# Patient Record
Sex: MALE | Race: WHITE | NOT HISPANIC OR LATINO | URBAN - METROPOLITAN AREA
[De-identification: names, ages, dates, MRNs, and addresses within clinical notes are randomized per-mention and may not be internally consistent; named-entity substitution may affect disease eponyms.]

---

## 2017-09-08 ENCOUNTER — EMERGENCY (EMERGENCY)
Facility: HOSPITAL | Age: 82
LOS: 1 days | Discharge: HOME | End: 2017-09-08
Attending: EMERGENCY MEDICINE | Admitting: EMERGENCY MEDICINE
Payer: MEDICARE

## 2017-09-08 VITALS
RESPIRATION RATE: 16 BRPM | HEART RATE: 117 BPM | OXYGEN SATURATION: 95 % | TEMPERATURE: 99 F | DIASTOLIC BLOOD PRESSURE: 67 MMHG | SYSTOLIC BLOOD PRESSURE: 114 MMHG

## 2017-09-08 VITALS
RESPIRATION RATE: 19 BRPM | HEART RATE: 91 BPM | OXYGEN SATURATION: 97 % | SYSTOLIC BLOOD PRESSURE: 140 MMHG | TEMPERATURE: 97 F | DIASTOLIC BLOOD PRESSURE: 75 MMHG

## 2017-09-08 PROCEDURE — 72100 X-RAY EXAM L-S SPINE 2/3 VWS: CPT | Mod: 26

## 2017-09-08 PROCEDURE — 99284 EMERGENCY DEPT VISIT MOD MDM: CPT | Mod: 25

## 2017-09-08 PROCEDURE — 72100 X-RAY EXAM L-S SPINE 2/3 VWS: CPT

## 2017-09-08 PROCEDURE — 73110 X-RAY EXAM OF WRIST: CPT

## 2017-09-08 PROCEDURE — 72220 X-RAY EXAM SACRUM TAILBONE: CPT | Mod: 26

## 2017-09-08 PROCEDURE — 93010 ELECTROCARDIOGRAM REPORT: CPT

## 2017-09-08 PROCEDURE — 72220 X-RAY EXAM SACRUM TAILBONE: CPT

## 2017-09-08 PROCEDURE — 93005 ELECTROCARDIOGRAM TRACING: CPT

## 2017-09-08 PROCEDURE — 73110 X-RAY EXAM OF WRIST: CPT | Mod: 26,RT

## 2017-09-08 RX ORDER — OXYCODONE HYDROCHLORIDE 5 MG/1
5 TABLET ORAL ONCE
Qty: 0 | Refills: 0 | Status: DISCONTINUED | OUTPATIENT
Start: 2017-09-08 | End: 2017-09-08

## 2017-09-08 RX ORDER — IBUPROFEN 200 MG
600 TABLET ORAL ONCE
Qty: 0 | Refills: 0 | Status: COMPLETED | OUTPATIENT
Start: 2017-09-08 | End: 2017-09-08

## 2017-09-08 RX ADMIN — Medication 600 MILLIGRAM(S): at 11:05

## 2017-09-08 RX ADMIN — Medication 600 MILLIGRAM(S): at 13:07

## 2017-09-08 RX ADMIN — OXYCODONE HYDROCHLORIDE 5 MILLIGRAM(S): 5 TABLET ORAL at 13:12

## 2017-09-08 NOTE — ED ADULT NURSE NOTE - OBJECTIVE STATEMENT
89yo male to Ed from assisted living facility after being found on floor. Pt denies fall, but c/o R middle and ring finger pain. Wife stated that pt had been c/o of that pain since yesterday, prior to fall. 89yo male to Ed from assisted living facility after being found on floor. Pt denies fall, but c/o R middle and ring finger pain. Wife stated that pt had been c/o of that pain since yesterday, prior to fall. Pt also c/o low back pain, no bruising or deformity noted. Pt denies LOC.

## 2017-09-08 NOTE — ED PROVIDER NOTE - OBJECTIVE STATEMENT
89 yo male, fell from chair at nursing home, mechanical fall, states he did not hit his head or neck, but rather, landed on his low back.  No LOC, ambulatory afterwards.  C/O mild/moderate low back pain and atraumatic R wrist pain that preceded the fall. 89 yo male, fell from chair at nursing home, mechanical fall, states he did not hit his head or neck, but rather, landed on his low back.  No LOC, ambulatory afterwards.  C/O mild/moderate low back pain and atraumatic R wrist pain that preceded the fall.  No CP, no SOB, no palpitations, no dizziness.

## 2017-09-08 NOTE — ED PROVIDER NOTE - PHYSICAL EXAMINATION
GEN: calm, cooperative, ENT: mucous membranes moist, HEAD: NCAT, CV: RRR, RESP: no respiratory distress, ABD: no abdominal TTP, MSK: paraspinal low lumbar TTP and coccygeal TTP, also w/ R wrist TTP without deformity or swelling, ROM intact, NEURO: awake, alert, oriented, PSYCH: affect normal

## 2017-09-08 NOTE — ED PROVIDER NOTE - INTERPRETATION
EKG reviewed for rate, rhythm, axis, intervals and segments, including QRS morphology, P wave appearance T wave appearance, AR interval, and QT interval.  I find the EKG to be unremarkable in all of these regards except as follows: 1st deg AVB, RBBB

## 2017-11-08 PROBLEM — Z00.00 ENCOUNTER FOR PREVENTIVE HEALTH EXAMINATION: Status: ACTIVE | Noted: 2017-11-08

## 2017-12-12 ENCOUNTER — APPOINTMENT (OUTPATIENT)
Dept: CARDIOLOGY | Facility: CLINIC | Age: 82
End: 2017-12-12
Payer: MEDICARE

## 2017-12-12 ENCOUNTER — LABORATORY RESULT (OUTPATIENT)
Age: 82
End: 2017-12-12

## 2017-12-12 ENCOUNTER — NON-APPOINTMENT (OUTPATIENT)
Age: 82
End: 2017-12-12

## 2017-12-12 VITALS
HEART RATE: 91 BPM | OXYGEN SATURATION: 95 % | DIASTOLIC BLOOD PRESSURE: 67 MMHG | SYSTOLIC BLOOD PRESSURE: 130 MMHG | HEIGHT: 66 IN | WEIGHT: 165 LBS | BODY MASS INDEX: 26.52 KG/M2

## 2017-12-12 DIAGNOSIS — R13.10 DYSPHAGIA, UNSPECIFIED: ICD-10-CM

## 2017-12-12 DIAGNOSIS — N40.0 BENIGN PROSTATIC HYPERPLASIA WITHOUT LOWER URINARY TRACT SYMPMS: ICD-10-CM

## 2017-12-12 DIAGNOSIS — I71.4 ABDOMINAL AORTIC ANEURYSM, W/OUT RUPTURE: ICD-10-CM

## 2017-12-12 DIAGNOSIS — E03.9 HYPOTHYROIDISM, UNSPECIFIED: ICD-10-CM

## 2017-12-12 DIAGNOSIS — I35.0 NONRHEUMATIC AORTIC (VALVE) STENOSIS: ICD-10-CM

## 2017-12-12 PROCEDURE — 93000 ELECTROCARDIOGRAM COMPLETE: CPT

## 2017-12-12 PROCEDURE — 99205 OFFICE O/P NEW HI 60 MIN: CPT

## 2017-12-12 RX ORDER — FINASTERIDE 5 MG/1
5 TABLET, FILM COATED ORAL
Qty: 90 | Refills: 0 | Status: ACTIVE | COMMUNITY
Start: 2017-12-12

## 2017-12-12 RX ORDER — TAMSULOSIN HYDROCHLORIDE 0.4 MG/1
0.4 CAPSULE ORAL
Qty: 90 | Refills: 3 | Status: ACTIVE | COMMUNITY
Start: 2017-12-12

## 2017-12-12 RX ORDER — LEVOTHYROXINE SODIUM 0.1 MG/1
100 TABLET ORAL
Qty: 90 | Refills: 3 | Status: ACTIVE | COMMUNITY
Start: 2017-12-12

## 2017-12-13 DIAGNOSIS — D51.9 VITAMIN B12 DEFICIENCY ANEMIA, UNSPECIFIED: ICD-10-CM

## 2017-12-14 LAB
ALBUMIN SERPL ELPH-MCNC: 3.3 G/DL
ALP BLD-CCNC: 64 U/L
ALT SERPL-CCNC: 10 U/L
ANION GAP SERPL CALC-SCNC: 11 MMOL/L
AST SERPL-CCNC: 14 U/L
BASOPHILS # BLD AUTO: 0.06 K/UL
BASOPHILS NFR BLD AUTO: 0.9 %
BILIRUB SERPL-MCNC: 0.3 MG/DL
BUN SERPL-MCNC: 28 MG/DL
CALCIUM SERPL-MCNC: 9.2 MG/DL
CHLORIDE SERPL-SCNC: 102 MMOL/L
CHOLEST SERPL-MCNC: 161 MG/DL
CHOLEST/HDLC SERPL: 4.4 RATIO
CO2 SERPL-SCNC: 28 MMOL/L
CREAT SERPL-MCNC: 0.87 MG/DL
EOSINOPHIL # BLD AUTO: 0.11 K/UL
EOSINOPHIL NFR BLD AUTO: 1.8
FOLATE SERPL-MCNC: 11.3 NG/ML
FT4I SERPL CALC-MCNC: 6 INDEX
GLUCOSE SERPL-MCNC: 143 MG/DL
HBA1C MFR BLD HPLC: 5.3 %
HCT VFR BLD CALC: 35.6 %
HDLC SERPL-MCNC: 37 MG/DL
HGB BLD-MCNC: 11.3 G/DL
LDLC SERPL CALC-MCNC: 94 MG/DL
LYMPHOCYTES # BLD AUTO: 0.89 K/UL
LYMPHOCYTES NFR BLD AUTO: 14 %
MAN DIFF?: NORMAL
MCHC RBC-ENTMCNC: 31.7 GM/DL
MCHC RBC-ENTMCNC: 34.5 PG
MCV RBC AUTO: 108.5 FL
MONOCYTES # BLD AUTO: 0.5 K/UL
MONOCYTES NFR BLD AUTO: 7.9 %
NEUTROPHILS # BLD AUTO: 4.78 K/UL
NEUTROPHILS NFR BLD AUTO: 73.6 %
PLATELET # BLD AUTO: 161 K/UL
POTASSIUM SERPL-SCNC: 4.4 MMOL/L
PROT SERPL-MCNC: 6.3 G/DL
RBC # BLD: 3.28 M/UL
RBC # FLD: 19.2 %
SODIUM SERPL-SCNC: 141 MMOL/L
T3RU NFR SERPL: 0.88 INDEX
T4 FREE SERPL-MCNC: 1.2 NG/DL
T4 SERPL-MCNC: 5.3 UG/DL
TRIGL SERPL-MCNC: 148 MG/DL
TSH SERPL-ACNC: 1.25 UIU/ML
VIT B12 SERPL-MCNC: 560 PG/ML
WBC # FLD AUTO: 6.34 K/UL

## 2017-12-18 DIAGNOSIS — G89.29 LOW BACK PAIN: ICD-10-CM

## 2017-12-18 DIAGNOSIS — M54.5 LOW BACK PAIN: ICD-10-CM

## 2017-12-18 RX ORDER — INDOMETHACIN 50 MG/1
50 CAPSULE ORAL TWICE DAILY
Qty: 60 | Refills: 3 | Status: ACTIVE | COMMUNITY
Start: 2017-12-18 | End: 1900-01-01

## 2018-01-05 ENCOUNTER — APPOINTMENT (OUTPATIENT)
Dept: SPEECH THERAPY | Facility: CLINIC | Age: 83
End: 2018-01-05

## 2018-01-10 ENCOUNTER — MESSAGE (OUTPATIENT)
Age: 83
End: 2018-01-10

## 2018-06-12 ENCOUNTER — INPATIENT (INPATIENT)
Facility: HOSPITAL | Age: 83
LOS: 11 days | Discharge: NURSING, REHAB OR LONG-TERM CARE | DRG: 871 | End: 2018-06-24
Attending: FAMILY MEDICINE | Admitting: FAMILY MEDICINE
Payer: MEDICARE

## 2018-06-12 VITALS
TEMPERATURE: 98 F | DIASTOLIC BLOOD PRESSURE: 69 MMHG | WEIGHT: 153 LBS | RESPIRATION RATE: 18 BRPM | HEIGHT: 66 IN | HEART RATE: 98 BPM | OXYGEN SATURATION: 97 % | SYSTOLIC BLOOD PRESSURE: 114 MMHG

## 2018-06-12 DIAGNOSIS — A41.9 SEPSIS, UNSPECIFIED ORGANISM: ICD-10-CM

## 2018-06-12 DIAGNOSIS — Z51.5 ENCOUNTER FOR PALLIATIVE CARE: ICD-10-CM

## 2018-06-12 DIAGNOSIS — Z71.89 OTHER SPECIFIED COUNSELING: ICD-10-CM

## 2018-06-12 DIAGNOSIS — Z29.9 ENCOUNTER FOR PROPHYLACTIC MEASURES, UNSPECIFIED: ICD-10-CM

## 2018-06-12 DIAGNOSIS — R65.10 SYSTEMIC INFLAMMATORY RESPONSE SYNDROME (SIRS) OF NON-INFECTIOUS ORIGIN WITHOUT ACUTE ORGAN DYSFUNCTION: ICD-10-CM

## 2018-06-12 DIAGNOSIS — N40.0 BENIGN PROSTATIC HYPERPLASIA WITHOUT LOWER URINARY TRACT SYMPTOMS: ICD-10-CM

## 2018-06-12 DIAGNOSIS — R45.1 RESTLESSNESS AND AGITATION: ICD-10-CM

## 2018-06-12 DIAGNOSIS — E03.9 HYPOTHYROIDISM, UNSPECIFIED: ICD-10-CM

## 2018-06-12 DIAGNOSIS — R53.81 OTHER MALAISE: ICD-10-CM

## 2018-06-12 DIAGNOSIS — I63.9 CEREBRAL INFARCTION, UNSPECIFIED: ICD-10-CM

## 2018-06-12 LAB
ALBUMIN SERPL ELPH-MCNC: 2.7 G/DL — LOW (ref 3.5–5)
ALP SERPL-CCNC: 85 U/L — SIGNIFICANT CHANGE UP (ref 40–120)
ALT FLD-CCNC: 17 U/L DA — SIGNIFICANT CHANGE UP (ref 10–60)
ANION GAP SERPL CALC-SCNC: 5 MMOL/L — SIGNIFICANT CHANGE UP (ref 5–17)
APPEARANCE UR: CLEAR — SIGNIFICANT CHANGE UP
APTT BLD: 27.3 SEC — LOW (ref 27.5–37.4)
AST SERPL-CCNC: 16 U/L — SIGNIFICANT CHANGE UP (ref 10–40)
BASOPHILS # BLD AUTO: 0.1 K/UL — SIGNIFICANT CHANGE UP (ref 0–0.2)
BASOPHILS NFR BLD AUTO: 0.5 % — SIGNIFICANT CHANGE UP (ref 0–2)
BILIRUB SERPL-MCNC: 0.8 MG/DL — SIGNIFICANT CHANGE UP (ref 0.2–1.2)
BILIRUB UR-MCNC: NEGATIVE — SIGNIFICANT CHANGE UP
BUN SERPL-MCNC: 28 MG/DL — HIGH (ref 7–18)
CALCIUM SERPL-MCNC: 8.7 MG/DL — SIGNIFICANT CHANGE UP (ref 8.4–10.5)
CHLORIDE SERPL-SCNC: 99 MMOL/L — SIGNIFICANT CHANGE UP (ref 96–108)
CHOLEST SERPL-MCNC: 118 MG/DL — SIGNIFICANT CHANGE UP (ref 10–199)
CO2 SERPL-SCNC: 28 MMOL/L — SIGNIFICANT CHANGE UP (ref 22–31)
COLOR SPEC: YELLOW — SIGNIFICANT CHANGE UP
CREAT SERPL-MCNC: 0.9 MG/DL — SIGNIFICANT CHANGE UP (ref 0.5–1.3)
DIFF PNL FLD: ABNORMAL
EOSINOPHIL # BLD AUTO: 0 K/UL — SIGNIFICANT CHANGE UP (ref 0–0.5)
EOSINOPHIL NFR BLD AUTO: 0 % — SIGNIFICANT CHANGE UP (ref 0–6)
GLUCOSE SERPL-MCNC: 133 MG/DL — HIGH (ref 70–99)
GLUCOSE UR QL: NEGATIVE — SIGNIFICANT CHANGE UP
HCT VFR BLD CALC: 32.7 % — LOW (ref 39–50)
HDLC SERPL-MCNC: 48 MG/DL — SIGNIFICANT CHANGE UP (ref 40–125)
HGB BLD-MCNC: 10.2 G/DL — LOW (ref 13–17)
INR BLD: 1.21 RATIO — HIGH (ref 0.88–1.16)
KETONES UR-MCNC: NEGATIVE — SIGNIFICANT CHANGE UP
LACTATE SERPL-SCNC: 1.2 MMOL/L — SIGNIFICANT CHANGE UP (ref 0.7–2)
LACTATE SERPL-SCNC: 2.3 MMOL/L — HIGH (ref 0.7–2)
LEUKOCYTE ESTERASE UR-ACNC: NEGATIVE — SIGNIFICANT CHANGE UP
LIPID PNL WITH DIRECT LDL SERPL: 55 MG/DL — SIGNIFICANT CHANGE UP
LYMPHOCYTES # BLD AUTO: 1.5 K/UL — SIGNIFICANT CHANGE UP (ref 1–3.3)
LYMPHOCYTES # BLD AUTO: 11.7 % — LOW (ref 13–44)
MCHC RBC-ENTMCNC: 31.2 GM/DL — LOW (ref 32–36)
MCHC RBC-ENTMCNC: 34.3 PG — HIGH (ref 27–34)
MCV RBC AUTO: 110 FL — HIGH (ref 80–100)
MONOCYTES # BLD AUTO: 0.6 K/UL — SIGNIFICANT CHANGE UP (ref 0–0.9)
MONOCYTES NFR BLD AUTO: 5.2 % — SIGNIFICANT CHANGE UP (ref 2–14)
NEUTROPHILS # BLD AUTO: 10.3 K/UL — HIGH (ref 1.8–7.4)
NEUTROPHILS NFR BLD AUTO: 82.6 % — HIGH (ref 43–77)
NITRITE UR-MCNC: NEGATIVE — SIGNIFICANT CHANGE UP
PH UR: 7 — SIGNIFICANT CHANGE UP (ref 5–8)
PLATELET # BLD AUTO: 199 K/UL — SIGNIFICANT CHANGE UP (ref 150–400)
POTASSIUM SERPL-MCNC: 4 MMOL/L — SIGNIFICANT CHANGE UP (ref 3.5–5.3)
POTASSIUM SERPL-SCNC: 4 MMOL/L — SIGNIFICANT CHANGE UP (ref 3.5–5.3)
PROT SERPL-MCNC: 7.2 G/DL — SIGNIFICANT CHANGE UP (ref 6–8.3)
PROT UR-MCNC: 15
PROTHROM AB SERPL-ACNC: 13.2 SEC — HIGH (ref 9.8–12.7)
RBC # BLD: 2.97 M/UL — LOW (ref 4.2–5.8)
RBC # FLD: 19.3 % — HIGH (ref 10.3–14.5)
SODIUM SERPL-SCNC: 132 MMOL/L — LOW (ref 135–145)
SP GR SPEC: 1.01 — SIGNIFICANT CHANGE UP (ref 1.01–1.02)
TOTAL CHOLESTEROL/HDL RATIO MEASUREMENT: 2.5 RATIO — LOW (ref 3.4–9.6)
TRIGL SERPL-MCNC: 76 MG/DL — SIGNIFICANT CHANGE UP (ref 10–149)
TROPONIN I SERPL-MCNC: 0.04 NG/ML — SIGNIFICANT CHANGE UP (ref 0–0.04)
UROBILINOGEN FLD QL: NEGATIVE — SIGNIFICANT CHANGE UP
WBC # BLD: 12.5 K/UL — HIGH (ref 3.8–10.5)
WBC # FLD AUTO: 12.5 K/UL — HIGH (ref 3.8–10.5)

## 2018-06-12 PROCEDURE — 71045 X-RAY EXAM CHEST 1 VIEW: CPT | Mod: 26

## 2018-06-12 PROCEDURE — 99285 EMERGENCY DEPT VISIT HI MDM: CPT

## 2018-06-12 PROCEDURE — 93880 EXTRACRANIAL BILAT STUDY: CPT | Mod: 26

## 2018-06-12 PROCEDURE — 70450 CT HEAD/BRAIN W/O DYE: CPT | Mod: 26

## 2018-06-12 PROCEDURE — 99223 1ST HOSP IP/OBS HIGH 75: CPT

## 2018-06-12 RX ORDER — HALOPERIDOL DECANOATE 100 MG/ML
2 INJECTION INTRAMUSCULAR ONCE
Qty: 0 | Refills: 0 | Status: DISCONTINUED | OUTPATIENT
Start: 2018-06-12 | End: 2018-06-12

## 2018-06-12 RX ORDER — SODIUM CHLORIDE 9 MG/ML
1000 INJECTION, SOLUTION INTRAVENOUS
Qty: 0 | Refills: 0 | Status: DISCONTINUED | OUTPATIENT
Start: 2018-06-12 | End: 2018-06-14

## 2018-06-12 RX ORDER — SODIUM CHLORIDE 9 MG/ML
1400 INJECTION INTRAMUSCULAR; INTRAVENOUS; SUBCUTANEOUS ONCE
Qty: 0 | Refills: 0 | Status: COMPLETED | OUTPATIENT
Start: 2018-06-12 | End: 2018-06-12

## 2018-06-12 RX ORDER — CEFTRIAXONE 500 MG/1
1 INJECTION, POWDER, FOR SOLUTION INTRAMUSCULAR; INTRAVENOUS EVERY 24 HOURS
Qty: 0 | Refills: 0 | Status: DISCONTINUED | OUTPATIENT
Start: 2018-06-12 | End: 2018-06-15

## 2018-06-12 RX ORDER — HALOPERIDOL DECANOATE 100 MG/ML
2 INJECTION INTRAMUSCULAR ONCE
Qty: 0 | Refills: 0 | Status: COMPLETED | OUTPATIENT
Start: 2018-06-12 | End: 2018-06-12

## 2018-06-12 RX ORDER — CEFTRIAXONE 500 MG/1
1 INJECTION, POWDER, FOR SOLUTION INTRAMUSCULAR; INTRAVENOUS ONCE
Qty: 0 | Refills: 0 | Status: COMPLETED | OUTPATIENT
Start: 2018-06-12 | End: 2018-06-12

## 2018-06-12 RX ORDER — HYDRALAZINE HCL 50 MG
10 TABLET ORAL EVERY 8 HOURS
Qty: 0 | Refills: 0 | Status: DISCONTINUED | OUTPATIENT
Start: 2018-06-12 | End: 2018-06-17

## 2018-06-12 RX ORDER — ALTEPLASE 100 MG
6 KIT INTRAVENOUS ONCE
Qty: 0 | Refills: 0 | Status: COMPLETED | OUTPATIENT
Start: 2018-06-12 | End: 2018-06-12

## 2018-06-12 RX ORDER — ACETAMINOPHEN 500 MG
650 TABLET ORAL EVERY 6 HOURS
Qty: 0 | Refills: 0 | Status: DISCONTINUED | OUTPATIENT
Start: 2018-06-12 | End: 2018-06-20

## 2018-06-12 RX ORDER — ACETAMINOPHEN 500 MG
650 TABLET ORAL ONCE
Qty: 0 | Refills: 0 | Status: COMPLETED | OUTPATIENT
Start: 2018-06-12 | End: 2018-06-12

## 2018-06-12 RX ORDER — ALTEPLASE 100 MG
56 KIT INTRAVENOUS ONCE
Qty: 0 | Refills: 0 | Status: COMPLETED | OUTPATIENT
Start: 2018-06-12 | End: 2018-06-12

## 2018-06-12 RX ORDER — ALTEPLASE 100 MG
56 KIT INTRAVENOUS ONCE
Qty: 0 | Refills: 0 | Status: DISCONTINUED | OUTPATIENT
Start: 2018-06-12 | End: 2018-06-12

## 2018-06-12 RX ORDER — CHLORHEXIDINE GLUCONATE 213 G/1000ML
1 SOLUTION TOPICAL
Qty: 0 | Refills: 0 | Status: DISCONTINUED | OUTPATIENT
Start: 2018-06-12 | End: 2018-06-14

## 2018-06-12 RX ORDER — AZITHROMYCIN 500 MG/1
TABLET, FILM COATED ORAL
Qty: 0 | Refills: 0 | Status: DISCONTINUED | OUTPATIENT
Start: 2018-06-12 | End: 2018-06-15

## 2018-06-12 RX ORDER — AZITHROMYCIN 500 MG/1
500 TABLET, FILM COATED ORAL ONCE
Qty: 0 | Refills: 0 | Status: COMPLETED | OUTPATIENT
Start: 2018-06-12 | End: 2018-06-12

## 2018-06-12 RX ORDER — AZITHROMYCIN 500 MG/1
500 TABLET, FILM COATED ORAL EVERY 24 HOURS
Qty: 0 | Refills: 0 | Status: DISCONTINUED | OUTPATIENT
Start: 2018-06-13 | End: 2018-06-15

## 2018-06-12 RX ORDER — LEVOTHYROXINE SODIUM 125 MCG
50 TABLET ORAL AT BEDTIME
Qty: 0 | Refills: 0 | Status: DISCONTINUED | OUTPATIENT
Start: 2018-06-12 | End: 2018-06-17

## 2018-06-12 RX ORDER — SODIUM CHLORIDE 9 MG/ML
700 INJECTION INTRAMUSCULAR; INTRAVENOUS; SUBCUTANEOUS ONCE
Qty: 0 | Refills: 0 | Status: COMPLETED | OUTPATIENT
Start: 2018-06-12 | End: 2018-06-12

## 2018-06-12 RX ADMIN — AZITHROMYCIN 250 MILLIGRAM(S): 500 TABLET, FILM COATED ORAL at 10:44

## 2018-06-12 RX ADMIN — CEFTRIAXONE 100 GRAM(S): 500 INJECTION, POWDER, FOR SOLUTION INTRAMUSCULAR; INTRAVENOUS at 07:50

## 2018-06-12 RX ADMIN — Medication 650 MILLIGRAM(S): at 16:34

## 2018-06-12 RX ADMIN — ALTEPLASE 56 MILLIGRAM(S): KIT at 09:02

## 2018-06-12 RX ADMIN — Medication 1 MILLIGRAM(S): at 17:26

## 2018-06-12 RX ADMIN — Medication 10 MILLIGRAM(S): at 12:01

## 2018-06-12 RX ADMIN — CHLORHEXIDINE GLUCONATE 1 APPLICATION(S): 213 SOLUTION TOPICAL at 14:37

## 2018-06-12 RX ADMIN — SODIUM CHLORIDE 4200 MILLILITER(S): 9 INJECTION INTRAMUSCULAR; INTRAVENOUS; SUBCUTANEOUS at 07:45

## 2018-06-12 RX ADMIN — ALTEPLASE 360 MILLIGRAM(S): KIT at 09:00

## 2018-06-12 RX ADMIN — Medication 1 MILLIGRAM(S): at 21:18

## 2018-06-12 RX ADMIN — Medication 50 MICROGRAM(S): at 22:00

## 2018-06-12 RX ADMIN — HALOPERIDOL DECANOATE 2 MILLIGRAM(S): 100 INJECTION INTRAMUSCULAR at 12:01

## 2018-06-12 RX ADMIN — CEFTRIAXONE 100 GRAM(S): 500 INJECTION, POWDER, FOR SOLUTION INTRAMUSCULAR; INTRAVENOUS at 14:36

## 2018-06-12 RX ADMIN — SODIUM CHLORIDE 2100 MILLILITER(S): 9 INJECTION INTRAMUSCULAR; INTRAVENOUS; SUBCUTANEOUS at 08:00

## 2018-06-12 RX ADMIN — Medication 1 MILLIGRAM(S): at 12:31

## 2018-06-12 RX ADMIN — Medication 650 MILLIGRAM(S): at 08:00

## 2018-06-12 RX ADMIN — SODIUM CHLORIDE 70 MILLILITER(S): 9 INJECTION, SOLUTION INTRAVENOUS at 12:02

## 2018-06-12 NOTE — STROKE CODE NOTE - SUBJECTIVE
89-year-old right-handed white gentleman first evaluated by telestroke at [facility] on 6/12/18 with change in mental status. According to his wife, he had been "shaking" for most of the night. On 6/12/18 he awoke and was able to walk to the bathroom with his walker as usual. He sat on the toilet, and then had difficulty getting off the toilet, possibly due to right hemiparesis. There was diminished verbal output. ROS otherwise negative. Exam. T = 102.4°F. NIHSS = 15. Alert, probably attentive, but hearing loss made exam difficult; minimal verbal output, except for occasional isolated words; appeared to follow most one step commands, although did not "make fist";? Decreased blink to threat on right; poor cooperation with motor exam: Both arms fall to bed in a couple of seconds, right possibly faster than left; both legs move spontaneously but not against gravity;? Decreased grimace to noxious stimuli on right; remainder of neurologic exam was nonfocal.

## 2018-06-12 NOTE — ED ADULT NURSE NOTE - OBJECTIVE STATEMENT
Arrived warm to touch and dysphasia.  Wife described episodes of trembling at night for 3 days.  Based on EMS recount of early morning situation Stroke code called.  Sepsis code also called for elevated temp.  Both protocols followed as per hospital policy.  Wife at bedside.  No obvious facial droop, moving all extremities however decreased sensations to right side

## 2018-06-12 NOTE — CONSULT NOTE ADULT - PROBLEM SELECTOR RECOMMENDATION 3
Per wife's report, pt alert and oriented, with increasing weakness and recurrent falls - 4 falls reported in the past week. Pt with HHA 12 x 7. PT to darleen.

## 2018-06-12 NOTE — STROKE CODE NOTE - OBJECTIVE
CT head (6/12/18) to my eye is unremarkable, although the official reading noted a small chronic right frontal cortical infarct. Impression. During the night of 6/11/18-6/12/18 he was "shaking" throughout most of the night. On 6/12/18, in the morning, he had difficulty getting off the toilet, possibly due to right-sided weakness and there was decreased verbal output. It's possible that he is aphasic with a subtle right hemiparesis, consistent with left hemispheric dysfunction, possibly left hemispheric infarction of unknown cause. CT head (6/12/18) to my eye is unremarkable, although the official reading noted a small chronic right frontal cortical infarct. Impression. During the night of 6/11/18-6/12/18 he was "shaking" throughout most of the night. On 6/12/18, in the morning, he had difficulty getting off the toilet, possibly due to right-sided weakness and there was decreased verbal output. It's possible that he is aphasic with a subtle right hemiparesis, consistent with left hemispheric dysfunction, possibly left hemispheric infarction of unknown cause. On the other hand, most of his neurologic findings are "soft", he is febrile, and after discussion with Dr. Jerome, he may be septic and encephalopathic.

## 2018-06-12 NOTE — ED ADULT NURSE REASSESSMENT NOTE - NS ED NURSE REASSESS COMMENT FT1
Approximately at 1030, patient became extremely agitated and combative.  Hitting at staff and family members.  Spoke with Dr. Gomez regarding and awaiting orders.

## 2018-06-12 NOTE — ED PROVIDER NOTE - MEDICAL DECISION MAKING DETAILS
Pt febrile upon return from CT, wife states pt tremolous with increased urianry frequency since last night. No facial asymwtry on evalaution. pt able to say his name slowly. although pt is not following commands Pt noted to have spontaneous movements of all extremities against gravity. DW neuro Dr. Libman and PMD Dr. Isaacs, pt to be admitted for IVF and abx, not tpa candidate.

## 2018-06-12 NOTE — H&P ADULT - ATTENDING COMMENTS
85 yo male from home HHA 12 hours ambulates with walker pmh of BPH, arthiritis hypothyroidism, thoracic aneurysm  presented to the hospital with complaints of weakness, aphasia , poor resposiveness and unable to get up from toilet seat.  is being admitted to ICU for concern of CVA s/p TPA monitoring and for sepsis likely secondary to pna vs UTI.       Problem/Plan - 1:  ·  Problem: CVA (cerebral vascular accident).  Plan: cva  presented with NIH score of 15  right hemiparesis and aphasia  s/p tele stroke and TPA given  monitor in ICU  neuro scheck q1 hour  neurology evaluation  start on statin , hold asa s/p TPA  hydralazine prn for bp >185/105  ct head: Moderate severe chronic small vessel ischemic changes in the frontal   parietal white matter and small chronic appearing right frontal cortical   infarct.  f/u carotid doppler and ECHO  ekg NSR FIRST Degree block left axis deviation  npo f/u s/s eval.      Problem/Plan - 2:  ·  Problem: Sepsis.  Plan: sepsis  wbc count of 12.5 lactate of 2.5  cxr: Mild pulmonary vascular congestion. Small right basilar nodular opacity;   consider follow   s/p 2 liter bolus  concernf or UTI too  c/w rocephin and azithromycin  f/u cultures and ua.      Problem/Plan - 3:  ·  Problem: Hypothyroidism.  Plan: on synthroid 100 at home  iv push half dose as NPO.      Problem/Plan - 4:  ·  Problem: BPH (benign prostatic hyperplasia).  Plan: hold home dose of flomax  resume when patient can swallow.      Problem/Plan - 5:  ·  Problem: Goals of care, counseling/discussion.  Plan: d/w at length with wife and daughter want DNR/DNI.      Problem/Plan - 6:  Problem: Need for prophylactic measure. Plan: no DVT Lovenox for DVT ppx as just got TPA. 89 yo male from home HHA 12 hours ambulates with walker pmh of BPH, arthiritis hypothyroidism, thoracic aneurysm  presented to the hospital with complaints of weakness, aphasia , poor resposiveness and unable to get up from toilet seat.  is being admitted to ICU for concern of CVA s/p TPA monitoring and for sepsis likely secondary to pna vs UTI.       Problem/Plan - 1:  ·  Problem: CVA (cerebral vascular accident).  Plan: cva  presented with NIH score of 15  right hemiparesis and aphasia  s/p tele stroke and TPA given  monitor in ICU  neuro scheck q1 hour  neurology evaluation  start on statin , hold asa s/p TPA  hydralazine prn for bp >185/105  ct head: Moderate severe chronic small vessel ischemic changes in the frontal   parietal white matter and small chronic appearing right frontal cortical   infarct.  f/u carotid doppler and ECHO  ekg NSR FIRST Degree block left axis deviation  npo f/u s/s eval.      Problem/Plan - 2:  ·  Problem: Sepsis.  Plan: sepsis  wbc count of 12.5 lactate of 2.5  cxr: Mild pulmonary vascular congestion. Small right basilar nodular opacity;   consider follow   s/p 2 liter bolus  concernf or UTI too  c/w rocephin and azithromycin  f/u cultures and ua.      Problem/Plan - 3:  ·  Problem: Hypothyroidism.  Plan: on synthroid 100 at home  iv push half dose as NPO.      Problem/Plan - 4:  ·  Problem: BPH (benign prostatic hyperplasia).  Plan: hold home dose of flomax  resume when patient can swallow.      Problem/Plan - 5:  ·  Problem: Goals of care, counseling/discussion.  Plan: d/w at length with wife and daughter want DNR/DNI.      Problem/Plan - 6:  Problem: Need for prophylactic measure. Plan: no DVT Lovenox for DVT ppx as just got TPA.

## 2018-06-12 NOTE — H&P ADULT - HISTORY OF PRESENT ILLNESS
85 yo male from home HHA 12 hours ambulates with walker pmh of BPH, arthiritis hypothyroidism, thoracic aneurysm  presented to the hospital with complaints of weakness, aphasia , poor resposiveness and unable to get up from toilet seat. history obtained by family at bedside. According to his wife, he had been "shaking" for most of the night. On 6/12/18 he awoke and was able to walk to the bathroom with his walker as usual. He sat on the toilet, and then had difficulty getting off the toiletThere was diminished verbal output. wife said he used his left arm to try to get up but she felt his right side was weak. his eyes were closed and he was not responsive. she called EMS. ROS positive for increased urianry frequency and buring for past couple of days. no chest pain , SOB cough.    in the ER Stroke code called on arrival at 7:15a, tele-stroke activated at 7:20a. VS STABEL EXCEPT tmax 102.4. ON TELE stroke patient had NIH scale of 15. b/l weakness greater on right side. code sepsis was also initiaated. labs pertinent for wbc count of q12 , ekg nsr left axis. lactate of 2.4. cxr : Mild pulmonary vascular congestion. Small right basilar nodular opacity. 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 TPA given at 9 am , and icu consult called.   and unable to get up from toilet seat. history obtained by family at bedside. According to his wife, he had been "shaking" for most of the night. On 6/12/18 he awoke and was able to walk to the bathroom with his walker as usual. He sat on the toilet, and then had difficulty getting off the toiletThere was diminished verbal output. wife said he used his left arm to try to get up but she felt his right side was weak. his eyes were closed and he was not responsive. she called EMS. ROS positive for increased urianry frequency and buring for past couple of days. no chest pain , SOB cough.    in the ER Stroke code called on arrival at 7:15a, tele-stroke activated at 7:20a. VS STABEL EXCEPT tmax 102.4. ON TELE stroke patient had NIH scale of 15. b/l weakness greater on right side. code sepsis was also initiaated. labs pertinent for wbc count of q12 , ekg nsr left axis. lactate of 2.4. cxr : Mild pulmonary vascular congestion. Small right basilar nodular opacity. 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 TPA given at 9 am , and icu consult called. 89 yo male from home HHA 12 hours ambulates with walker pmh of BPH, arthiritis hypothyroidism, thoracic aneurysm  presented to the hospital with complaints of weakness, aphasia , poor resposiveness and unable to get up from toilet seat. history obtained by family at bedside. According to his wife, he had been "shaking" for most of the night. On 6/12/18 he awoke and was able to walk to the bathroom with his walker as usual. He sat on the toilet, and then had difficulty getting off the toiletThere was diminished verbal output. wife said he used his left arm to try to get up but she felt his right side was weak. his eyes were closed and he was not responsive. she called EMS. ROS positive for increased urianry frequency and buring for past couple of days. no chest pain , SOB cough.    in the ER Stroke code called on arrival at 7:15a, tele-stroke activated at 7:20a. VS STABEL EXCEPT tmax 102.4. ON TELE stroke patient had NIH scale of 15. b/l weakness greater on right side. code sepsis was also initiaated. labs pertinent for wbc count of q12 , ekg nsr left axis. lactate of 2.4. cxr : Mild pulmonary vascular congestion. Small right basilar nodular opacity. 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 TPA given at 9 am , and icu consult called.   and unable to get up from toilet seat. history obtained by family at bedside. According to his wife, he had been "shaking" for most of the night. On 6/12/18 he awoke and was able to walk to the bathroom with his walker as usual. He sat on the toilet, and then had difficulty getting off the toiletThere was diminished verbal output. wife said he used his left arm to try to get up but she felt his right side was weak. his eyes were closed and he was not responsive. she called EMS. ROS positive for increased urianry frequency and buring for past couple of days. no chest pain , SOB cough.    in the ER Stroke code called on arrival at 7:15a, tele-stroke activated at 7:20a. VS STABEL EXCEPT tmax 102.4. ON TELE stroke patient had NIH scale of 15. b/l weakness greater on right side. code sepsis was also initiaated. labs pertinent for wbc count of q12 , ekg nsr left axis. lactate of 2.4. cxr : Mild pulmonary vascular congestion. Small right basilar nodular opacity. 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 TPA given at 9 am , and icu consult called.

## 2018-06-12 NOTE — PROGRESS NOTE ADULT - SUBJECTIVE AND OBJECTIVE BOX
Patient admitted to the ICU from ER with acute cva. S/P tpa. With possible sepsis due to UTI. spoke with wife and daughter and son in the ICU in detail.

## 2018-06-12 NOTE — H&P ADULT - PROBLEM SELECTOR PLAN 2
sepsis  wbc count of 12.5 lactate of 2.5  cxr: Mild pulmonary vascular congestion. Small right basilar nodular opacity;   consider follow   s/p 2 liter bolus  concernf or UTI too  c/w rocephin and azithromycin  f/u cultures and ua

## 2018-06-12 NOTE — H&P ADULT - PROBLEM SELECTOR PLAN 1
cva  presented with NIH score of 15  right hemiparesis and aphasia  s/p tele stroke and TPA given  monitor in ICU  neuro scheck q1 hour  neurology evaluation  start on statin , hold asa s/p TPA  hydralazine prn for bp >185/105  ct head: Moderate severe chronic small vessel ischemic changes in the frontal   parietal white matter and small chronic appearing right frontal cortical   infarct.  f/u carotid doppler and ECHO  ekg NSR FIRST Degree block left axis deviation  npo f/u s/s eval

## 2018-06-12 NOTE — CONSULT NOTE ADULT - SUBJECTIVE AND OBJECTIVE BOX
HPI:  89 yo male from home HHA 12 hours ambulates with walker pmh of BPH, arthiritis hypothyroidism, thoracic aneurysm presented to the hospital with complaints of weakness, aphasia, poor responsiveness and unable to get up from toilet seat. s/p TPA. Pt admitted to ICU for concern of CVA s/p TPA monitoring and for sepsis likely secondary to pna vs UTI. DNR / DNI on file.     PAST MEDICAL & SURGICAL HISTORY:  Neurogenic bladder  Hypothyroidism      SOCIAL HISTORY:    Admitted from:  home, lives with wife. Has HHA 12 x 7  Anabaptist:        Buddhist                            Preferred Language: English     Surrogate/HCP/Guardian: April Rodrigues (dtr): [phone]    FAMILY HISTORY: noncontributory to current condition    Baseline ADLs (prior to admission): ambulatory with walker, recurrent falls (4 falls within last week), increasing weakness     No Known Allergies    Present Symptoms:     Review of Systems: Unable to obtain due to poor mentation    MEDICATIONS  (STANDING):  azithromycin  IVPB      cefTRIAXone   IVPB 1 Gram(s) IV Intermittent every 24 hours  chlorhexidine 4% Liquid 1 Application(s) Topical <User Schedule>  dextrose 5% + sodium chloride 0.9%. 1000 milliLiter(s) (70 mL/Hr) IV Continuous <Continuous>  hydrALAZINE Injectable 10 milliGRAM(s) IV Push every 8 hours  levothyroxine Injectable 50 MICROGram(s) IV Push at bedtime    MEDICATIONS  (PRN):  acetaminophen  Suppository 650 milliGRAM(s) Rectal every 6 hours PRN For Temp greater than 38 C (100.4 F)  LORazepam   Injectable 1 milliGRAM(s) IV Push every 4 hours PRN Agitation      PHYSICAL EXAM:    Vital Signs Last 24 Hrs  T(C): 39.5 (2018 16:00), Max: 39.5 (2018 16:00)  T(F): 103.1 (2018 16:00), Max: 103.1 (2018 16:00)  HR: 58 (2018 18:00) (51 - 105)  BP: 113/40 (2018 18:00) (103/58 - 161/45)  BP(mean): 57 (2018 18:00) (57 - 85)  RR: 21 (2018 18:00) (14 - 24)  SpO2: 99% (2018 18:00) (93% - 100%)    General: pt slept thru exam, nonverbal, unarousable, unable to follow commands   Karnofsky Performance Score/Palliative Performance Status Version2:    30 %    HEENT: normal     Lungs: comfortable    CV: normal    : condom cath  Musculoskeletal: at baseline: ambulatory with walker, assist with ADLs. Now dependent on ADLs   Neuro: pt slept thru exam; episodes of agitation per wife's report  Oral intake ability: unable/only mouth care    Diet: NPO    LABS:                        10.2   12.5  )-----------( 199      ( 2018 07:53 )             32.7     06-12    132<L>  |  99  |  28<H>  ----------------------------<  133<H>  4.0   |  28  |  0.90    Ca    8.7      2018 07:53    TPro  7.2  /  Alb  2.7<L>  /  TBili  0.8  /  DBili  x   /  AST  16  /  ALT  17  /  AlkPhos  85  -12    Urinalysis Basic - ( 2018 16:51 )    Color: Yellow / Appearance: Clear / S.015 / pH: x  Gluc: x / Ketone: Negative  / Bili: Negative / Urobili: Negative   Blood: x / Protein: 15 / Nitrite: Negative   Leuk Esterase: Negative / RBC: 10-25 /HPF / WBC 3-5 /HPF   Sq Epi: x / Non Sq Epi: Occasional /HPF / Bacteria: Trace /HPF        RADIOLOGY & ADDITIONAL STUDIES:  < from: CT Brain Stroke Protocol (18 @ 07:32) >  EXAM:  CT BRAIN STROKE PROTOCOL                            PROCEDURE DATE:  2018          INTERPRETATION:  NONCONTRAST CT OF THE BRAIN DATED 2018.    CLINICAL HISTORY: Right-sided weakness since 6:00 AM, code stroke..    TECHNIQUE: AxialCT images are obtained from the cranial vertex to the   skull base without the administration of IV contrast. Images are   reformatted in sagittal and coronal planes.    No prior studies are available for comparison.    FINDINGS:    Several additional images through the posterior fossa secondary to motion   artifact. There is no gross acute intra-axial or extra axial hemorrhage.   There is no mass effect or shift of the midline. There is moderate   cerebral volume loss with commensurate prominence of the ventricles and   sulci. Moderate to severe chronic small vessel ischemic changes are seen   in the frontoparietal white matter. There is a small chronic appearing   right frontal cortical infarct. There is no CT evidence of a large acute   transcortical infarct. There are atherosclerotic calcifications of the   intracranial carotid and intradural vertebral arteries.    The regional soft tissues and osseous structures are unremarkable apart   from evidence of a left lens surgery. The visualized paranasal sinuses   and tympanic/mastoid cavities are clear apart from mild bilateral ethmoid   and maxillary sinus mucosal thickening and a small mucous retention cyst   versus polyp in the right sphenoid sinus.    IMPRESSION:    Motion degraded exam, particularly at the level of the posterior fossa.   No gross acute intracranial hemorrhage, mass effect, or CT evidence of a   large acute transcortical infarct.    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      < end of copied text >    < from: Xray Chest 1 View-PORTABLE IMMEDIATE (18 @ 09:07) >  EXAM:  XR CHEST PORTABLE IMMED 1V                        PROCEDURE DATE:  2018    INTERPRETATION:  PORTABLE CHEST X-RAY  HISTORY: Shortness of Breath.  COMPARISON: None.  FINDINGS:  Patient is rotated.  Mild pulmonary vascular congestion. Small right basilar nodular opacity.  No focal lung consolidation.  No pneumothorax or pleural effusion.   The cardiac silhouette is not accurately assessed by AP technique.    IMPRESSION:  Mild pulmonary vascular congestion. Small right basilar nodular opacity;   consider follow up PA/lateral chest x-rays.      < end of copied text >  < from: US Duplex Carotid Arteries Complete, Bilateral (18 @ 15:27) >  EXAM:  US DPLX CAROTIDS COMPL BI                        PROCEDURE DATE:  2018    INTERPRETATION:  Carotid duplex Doppler ultrasound  Indication: cva  Comparison: None  Carotid duplex Doppler ultrasound is performed. Grayscale ultrasound   demonstrates atherosclerotic plaques in the bilateral common carotid   arteries, carotid bulbs and proximal internal carotid arteries. The flow   velocity measurements during peak systolic phase in centimeters per   second are as the following:    Right , ICA 89, ECA 72.  Left , ICA 88, .    The end diastolic velocity measurement for the right ICA is 17, and  for   the left ICA is 10.    The peak systolic ICA/CCA velocity ratio on the right is 0.7, and on the   left is 0.7.    Both vertebral arteries maintain normal antegrade flow direction.    Impression: No hemodynamically significant internal carotid artery   stenosis by velocity criteria.      < end of copied text >          ADVANCE DIRECTIVES: DNR / DNI on file.

## 2018-06-12 NOTE — PATIENT PROFILE ADULT. - FALL HARM RISK
other/age(85 years old or older)/generalized weakness/bones(Osteoporosis,prev fx,steroid use,metastatic bone ca/coagulation(Bleeding disorder R/T clinical cond/anti-coags)

## 2018-06-12 NOTE — ED PROVIDER NOTE - CARE PLAN
Principal Discharge DX:	SIRS (systemic inflammatory response syndrome) Principal Discharge DX:	CVA (cerebral vascular accident)  Secondary Diagnosis:	SIRS (systemic inflammatory response syndrome)

## 2018-06-12 NOTE — ED ADULT NURSE NOTE - PLAN OF CARE
Side rails/Fall precautions/Bedside visitors/Call bell/Explanation of exam/test/NPO/Position of comfort

## 2018-06-12 NOTE — CONSULT NOTE ADULT - PROBLEM SELECTOR RECOMMENDATION 4
Spoke with patient's wife at bedside - discussed status. Reports she wants to be involved in decision making along with her daughter, April. Attempted to call April but phone just rang and unable to leave message. DNR / DNI on file. All questions answered.

## 2018-06-12 NOTE — ED PROVIDER NOTE - OBJECTIVE STATEMENT
Initially endorsed by EMS that pt had fall and found in bathroom unresponsive. Pt dysarthric and not following commands on presentation. Stroke code called on arrival at 7:15a, tele-stroke activated at 7:20a.   At 7:30a wife in attendance and states pt tremulous since last night and assisted pt to bathroom in walker but pt unable to stand and come off toilet. EMS confirmed pt was sitting in toilet. Pt returned from CT and noted to have core temp of 102. Initially endorsed by EMS that pt had fall and found in bathroom unresponsive with right facial droop.. Pt dysarthric and not following commands on presentation. Stroke code called on arrival at 7:15a, tele-stroke activated at 7:20a.   At 7:30a wife in attendance and states pt tremulous since last night and assisted pt to bathroom in walker but pt unable to stand and come off toilet. EMS confirmed pt was sitting in toilet. Pt returned from CT and noted to have core temp of 102. Wife denies current facial asymmetry. Initially endorsed by EMS that pt had fall and found in bathroom unresponsive with right facial droop.. Pt dysarthric and not following commands on presentation. Stroke code called on arrival at 7:15a, tele-stroke activated at 7:20a.   At 7:30a wife in attendance in ER and states pt tremulous since last night and assisted pt to bathroom in walker (approx 6am) but pt unable to stand and come off toilet. EMS confirmed pt was sitting in toilet. Pt returned from CT and noted to have core temp of 102. Wife denies current facial asymmetry.

## 2018-06-12 NOTE — ED PROVIDER NOTE - PROGRESS NOTE DETAILS
At approx 8:30a- Case rediscussed with neurologist Dr. Libman, suspect pt with with condominant CVA and Sepsis. Pt remains dysarthric. I spoke at length with pt's daughter (April)  and wife discussing risk and benefits of tpa including 6% hemorrhagic CVA risk. Family consented to tPA. ICU amending and PMD Dr. Isaacs aware. Door to needle time was > 60 minutes due to initial impression that pts symptoms may be due solely to sepsis and prolong discussion for verbal consent for tPA. At approx 8:30a- Case rediscussed with neurologist Dr. Libman, suspect pt with with condominant CVA and Sepsis. Pt remains dysarthric. I spoke at length with pt's daughter (April)  and wife discussing risk and benefits of tpa including 6% hemorrhagic CVA risk. Family consented to tPA. ICU attending and PMD Dr. Isaacs aware. Door to needle time was > 60 minutes due ambiguity of symptoms confounded by initial impression that pts symptoms may be due solely to sepsis and prolong discussion for verbal consent for tPA. Pt less dysarthric and moving upper extremities to command without drift.

## 2018-06-12 NOTE — STROKE CODE NOTE - ASSESSMENT/PLAN
On the other hand, most of his neurologic findings are somewhat uncertain, he is febrile, and after discussion with Dr. Jerome, he may be septic and encephalopathic. This is a very ambiguous situation and the diagnosis is uncertain. Given his advanced age and the possibility of an increased risk of intracranial hemorrhage, and the uncertainty that he has had a stroke, he is excluded from both IV TPA and endovascular thrombectomy. Suggest. Further medical management as per [facility] team; consider repeat brain imaging. On the other hand, most of his neurologic findings are somewhat uncertain, he is febrile, and after discussion with Dr. Jerome, he may be septic and encephalopathic. This is a very ambiguous situation and the diagnosis is uncertain. Given his advanced age and the possibility of an increased risk of intracranial hemorrhage, and the uncertainty that he has had a stroke, he is excluded from both IV TPA and endovascular thrombectomy. Suggest. Further medical management as per [facility] team; consider repeat brain imaging.    Addendum. Discussed further with Dr. Jerome.  Allowing for ambiguity, he does seem to have focal signs referable to the left hemisphere consistent with possible stroke. Dr. Jerome will discuss further with his daughter (health care proxy) and depending on informed consent and her decision, may proceed as quickly as possible with IV TPA. This is a very ambiguous situation and the diagnosis is uncertain. Given his advanced age and possibility of an increased risk of intracranial hemorrhage, and the uncertainty that he has had a stroke, excluded from both IV TPA and endovascular thrombectomy. Suggest. Further medical management as per [place] team; consider repeat brain imaging. Addendum. Discussed further with Dr. Jerome.  Allowing for ambiguity, he does seem to have focal signs referable to the left hemisphere consistent with possible stroke. Dr. Jerome will discuss further with his daughter (health care proxy) and depending on informed consent and her decision, may proceed as quickly as possible with IV TPA. Addendum #2. Door-needle time greater than 60 minutes because of uncertain diagnosis, and need for long discussion with family in order to obtain consent, as per Dr. Jerome

## 2018-06-12 NOTE — CONSULT NOTE ADULT - PROBLEM SELECTOR RECOMMENDATION 9
Pt presented with +weakness and aphasia; NIH score 15. s/p telestroke and TPA. CT indicated moderate severe chronic small vessel ischemic changes in the frontal   parietal white matter and small chronic appearing right frontal cortical   infarct. Pt remains NPO pending speech/swallow eval. Neuro eval pending. DNR / DNI on file.

## 2018-06-12 NOTE — PROGRESS NOTE ADULT - ASSESSMENT
87 yo male from home HHA 12 hours ambulates with walker pmh of BPH, arthiritis hypothyroidism, thoracic aneurysm  presented to the hospital with complaints of weakness, aphasia , poor resposiveness and unable to get up from toilet seat.  is being admitted to ICU for concern of CVA s/p TPA monitoring and for sepsis likely secondary to pna vs UTI.

## 2018-06-12 NOTE — H&P ADULT - ASSESSMENT
85 yo male from home HHA 12 hours ambulates with walker pmh of BPH, arthiritis hypothyroidism, thoracic aneurysm  presented to the hospital with complaints of weakness, aphasia , poor resposiveness and unable to get up from toilet seat.  is being admitted to ICU for concern of CVA s/p TPA monitoring and for sepsis likely secondary to pna vs UTI. 89 yo male from home HHA 12 hours ambulates with walker pmh of BPH, arthiritis hypothyroidism, thoracic aneurysm  presented to the hospital with complaints of weakness, aphasia , poor resposiveness and unable to get up from toilet seat.  is being admitted to ICU for concern of CVA s/p TPA monitoring and for sepsis likely secondary to pna vs UTI.

## 2018-06-13 LAB
24R-OH-CALCIDIOL SERPL-MCNC: 49.3 NG/ML — SIGNIFICANT CHANGE UP (ref 30–80)
ALBUMIN SERPL ELPH-MCNC: 2.3 G/DL — LOW (ref 3.5–5)
ALP SERPL-CCNC: 67 U/L — SIGNIFICANT CHANGE UP (ref 40–120)
ALT FLD-CCNC: 15 U/L DA — SIGNIFICANT CHANGE UP (ref 10–60)
ANION GAP SERPL CALC-SCNC: 7 MMOL/L — SIGNIFICANT CHANGE UP (ref 5–17)
AST SERPL-CCNC: 38 U/L — SIGNIFICANT CHANGE UP (ref 10–40)
BASOPHILS # BLD AUTO: 0.1 K/UL — SIGNIFICANT CHANGE UP (ref 0–0.2)
BASOPHILS NFR BLD AUTO: 1.1 % — SIGNIFICANT CHANGE UP (ref 0–2)
BILIRUB SERPL-MCNC: 0.9 MG/DL — SIGNIFICANT CHANGE UP (ref 0.2–1.2)
BUN SERPL-MCNC: 27 MG/DL — HIGH (ref 7–18)
CALCIUM SERPL-MCNC: 7.9 MG/DL — LOW (ref 8.4–10.5)
CHLORIDE SERPL-SCNC: 104 MMOL/L — SIGNIFICANT CHANGE UP (ref 96–108)
CHOLEST SERPL-MCNC: 105 MG/DL — SIGNIFICANT CHANGE UP (ref 10–199)
CO2 SERPL-SCNC: 22 MMOL/L — SIGNIFICANT CHANGE UP (ref 22–31)
CREAT SERPL-MCNC: 0.83 MG/DL — SIGNIFICANT CHANGE UP (ref 0.5–1.3)
CULTURE RESULTS: NO GROWTH — SIGNIFICANT CHANGE UP
EOSINOPHIL # BLD AUTO: 0 K/UL — SIGNIFICANT CHANGE UP (ref 0–0.5)
EOSINOPHIL NFR BLD AUTO: 0 % — SIGNIFICANT CHANGE UP (ref 0–6)
GLUCOSE SERPL-MCNC: 115 MG/DL — HIGH (ref 70–99)
HBA1C BLD-MCNC: 5.2 % — SIGNIFICANT CHANGE UP (ref 4–5.6)
HCT VFR BLD CALC: 32.6 % — LOW (ref 39–50)
HDLC SERPL-MCNC: 43 MG/DL — SIGNIFICANT CHANGE UP (ref 40–125)
HGB BLD-MCNC: 10.2 G/DL — LOW (ref 13–17)
LIPID PNL WITH DIRECT LDL SERPL: 47 MG/DL — SIGNIFICANT CHANGE UP
LYMPHOCYTES # BLD AUTO: 1.3 K/UL — SIGNIFICANT CHANGE UP (ref 1–3.3)
LYMPHOCYTES # BLD AUTO: 10.8 % — LOW (ref 13–44)
MAGNESIUM SERPL-MCNC: 1.9 MG/DL — SIGNIFICANT CHANGE UP (ref 1.6–2.6)
MCHC RBC-ENTMCNC: 31.3 GM/DL — LOW (ref 32–36)
MCHC RBC-ENTMCNC: 34.1 PG — HIGH (ref 27–34)
MCV RBC AUTO: 108.9 FL — HIGH (ref 80–100)
MONOCYTES # BLD AUTO: 0.6 K/UL — SIGNIFICANT CHANGE UP (ref 0–0.9)
MONOCYTES NFR BLD AUTO: 5.3 % — SIGNIFICANT CHANGE UP (ref 2–14)
NEUTROPHILS # BLD AUTO: 10.1 K/UL — HIGH (ref 1.8–7.4)
NEUTROPHILS NFR BLD AUTO: 82.8 % — HIGH (ref 43–77)
PHOSPHATE SERPL-MCNC: 2.3 MG/DL — LOW (ref 2.5–4.5)
PLATELET # BLD AUTO: 115 K/UL — LOW (ref 150–400)
POTASSIUM SERPL-MCNC: 4.4 MMOL/L — SIGNIFICANT CHANGE UP (ref 3.5–5.3)
POTASSIUM SERPL-SCNC: 4.4 MMOL/L — SIGNIFICANT CHANGE UP (ref 3.5–5.3)
PROT SERPL-MCNC: 6.8 G/DL — SIGNIFICANT CHANGE UP (ref 6–8.3)
RBC # BLD: 3 M/UL — LOW (ref 4.2–5.8)
RBC # FLD: 20.9 % — HIGH (ref 10.3–14.5)
SODIUM SERPL-SCNC: 133 MMOL/L — LOW (ref 135–145)
SPECIMEN SOURCE: SIGNIFICANT CHANGE UP
TOTAL CHOLESTEROL/HDL RATIO MEASUREMENT: 2.4 RATIO — LOW (ref 3.4–9.6)
TRIGL SERPL-MCNC: 76 MG/DL — SIGNIFICANT CHANGE UP (ref 10–149)
TSH SERPL-MCNC: 1.76 UU/ML — SIGNIFICANT CHANGE UP (ref 0.34–4.82)
WBC # BLD: 12.2 K/UL — HIGH (ref 3.8–10.5)
WBC # FLD AUTO: 12.2 K/UL — HIGH (ref 3.8–10.5)

## 2018-06-13 PROCEDURE — 70450 CT HEAD/BRAIN W/O DYE: CPT | Mod: 26

## 2018-06-13 PROCEDURE — 99291 CRITICAL CARE FIRST HOUR: CPT

## 2018-06-13 RX ORDER — HEPARIN SODIUM 5000 [USP'U]/ML
5000 INJECTION INTRAVENOUS; SUBCUTANEOUS EVERY 8 HOURS
Qty: 0 | Refills: 0 | Status: DISCONTINUED | OUTPATIENT
Start: 2018-06-13 | End: 2018-06-20

## 2018-06-13 RX ORDER — ASPIRIN/CALCIUM CARB/MAGNESIUM 324 MG
300 TABLET ORAL DAILY
Qty: 0 | Refills: 0 | Status: DISCONTINUED | OUTPATIENT
Start: 2018-06-13 | End: 2018-06-17

## 2018-06-13 RX ORDER — HALOPERIDOL DECANOATE 100 MG/ML
1 INJECTION INTRAMUSCULAR ONCE
Qty: 0 | Refills: 0 | Status: COMPLETED | OUTPATIENT
Start: 2018-06-13 | End: 2018-06-13

## 2018-06-13 RX ORDER — ATORVASTATIN CALCIUM 80 MG/1
40 TABLET, FILM COATED ORAL AT BEDTIME
Qty: 0 | Refills: 0 | Status: DISCONTINUED | OUTPATIENT
Start: 2018-06-13 | End: 2018-06-20

## 2018-06-13 RX ADMIN — Medication 1 MILLIGRAM(S): at 08:23

## 2018-06-13 RX ADMIN — Medication 1 MILLIGRAM(S): at 06:28

## 2018-06-13 RX ADMIN — Medication 650 MILLIGRAM(S): at 21:01

## 2018-06-13 RX ADMIN — Medication 1 MILLIGRAM(S): at 21:30

## 2018-06-13 RX ADMIN — SODIUM CHLORIDE 70 MILLILITER(S): 9 INJECTION, SOLUTION INTRAVENOUS at 05:31

## 2018-06-13 RX ADMIN — HALOPERIDOL DECANOATE 1 MILLIGRAM(S): 100 INJECTION INTRAMUSCULAR at 12:45

## 2018-06-13 RX ADMIN — Medication 1 MILLIGRAM(S): at 02:46

## 2018-06-13 RX ADMIN — Medication 300 MILLIGRAM(S): at 12:44

## 2018-06-13 RX ADMIN — CEFTRIAXONE 100 GRAM(S): 500 INJECTION, POWDER, FOR SOLUTION INTRAMUSCULAR; INTRAVENOUS at 14:49

## 2018-06-13 RX ADMIN — CHLORHEXIDINE GLUCONATE 1 APPLICATION(S): 213 SOLUTION TOPICAL at 05:32

## 2018-06-13 RX ADMIN — HEPARIN SODIUM 5000 UNIT(S): 5000 INJECTION INTRAVENOUS; SUBCUTANEOUS at 21:15

## 2018-06-13 RX ADMIN — HEPARIN SODIUM 5000 UNIT(S): 5000 INJECTION INTRAVENOUS; SUBCUTANEOUS at 14:49

## 2018-06-13 RX ADMIN — Medication 50 MICROGRAM(S): at 21:15

## 2018-06-13 RX ADMIN — AZITHROMYCIN 250 MILLIGRAM(S): 500 TABLET, FILM COATED ORAL at 10:00

## 2018-06-13 RX ADMIN — ATORVASTATIN CALCIUM 40 MILLIGRAM(S): 80 TABLET, FILM COATED ORAL at 21:15

## 2018-06-13 NOTE — PROGRESS NOTE ADULT - ATTENDING COMMENTS
87 yo male from home HHA 12 hours ambulates with walker pmh of BPH, arthritis hypothyroidism, thoracic aneurysm  presented to the hospital with complaints of weakness, aphasia , poor responsiveness and unable to get up from toilet seat.  is being admitted to ICU for concern of CVA s/p TPA monitoring and for sepsis likely secondary to pna vs UTI.       Problem/Plan - 1:  ·  Problem: CVA (cerebral vascular accident).  Plan: cva  -presented with NIH score of 15  -s/p tele stroke and TPA given @ 9am 6/12  -NIH score difficult to assess this AM due to agitated state  -right hemiparesis and aphasia  -neuro check q2 hour  -c/w asa therapy, begin statin when pt cleared for PO meds  -hydralazine prn for bp >185/105  -ct head: Moderate severe chronic small vessel ischemic changes in the frontal   parietal white matter and small chronic appearing right frontal cortical   infarct.  -Carotid doppler neg for sig stenosis  -f/u repeat CT in 24 hrs  -f/u ECHO  -ekg NSR FIRST Degree block left axis deviation  -npo f/u s/s eval  -Neuro: Dr. Pineda     Problem/Plan - 2:  ·  Problem: Sepsis.  Plan: sepsis  -wbc count of 12.5 lactate of 2.5  -UA neg  -cxr: Mild pulmonary vascular congestion. Small right basilar nodular opacity;   -c/w rocephin and azithromycin     Problem/Plan - 3:  ·  Problem: Hypothyroidism.  Plan: on synthroid 100 at home  -iv push half dose as NPO.      Problem/Plan - 4:  ·  Problem: BPH (benign prostatic hyperplasia).  Plan: hold home dose of flomax  -resume when patient can swallow.      Problem/Plan - 5:  ·  Problem: Goals of care, counseling/discussion.  Plan: d/w at length with wife and daughter -DNR/DNI     Problem/Plan - 6:  Problem: Need for prophylactic measure. Plan: no DVT Lovenox for DVT ppx as just got TPA.

## 2018-06-13 NOTE — PROGRESS NOTE ADULT - ASSESSMENT
Assessment and Plan:    Assessment:  · Assessment	  85 yo male from home HHA 12 hours ambulates with walker pmh of BPH, arthritis hypothyroidism, thoracic aneurysm  presented to the hospital with complaints of weakness, aphasia , poor responsiveness and unable to get up from toilet seat.  is being admitted to ICU for concern of CVA s/p TPA monitoring and for sepsis likely secondary to pna vs UTI.       Problem/Plan - 1:  ·  Problem: CVA (cerebral vascular accident).  Plan: cva  -presented with NIH score of 15  -right hemiparesis and aphasia  -s/p tele stroke and TPA given @ 9am  -neuro scheck q2 hour  -c/w asa and statin therapy  -hydralazine prn for bp >185/105  -ct head: Moderate severe chronic small vessel ischemic changes in the frontal   parietal white matter and small chronic appearing right frontal cortical   infarct.  -f/u repeat CT in 24 hrs  -f/u ECHO  -ekg NSR FIRST Degree block left axis deviation  -npo f/u s/s eval  -Neuro: Dr. Pineda     Problem/Plan - 2:  ·  Problem: Sepsis.  Plan: sepsis  -wbc count of 12.5 lactate of 2.5  -cxr: Mild pulmonary vascular congestion. Small right basilar nodular opacity;   s/p 2 liter bolus  concernf or UTI too  c/w rocephin and azithromycin  f/u cultures and ua.      Problem/Plan - 3:  ·  Problem: Hypothyroidism.  Plan: on synthroid 100 at home  iv push half dose as NPO.      Problem/Plan - 4:  ·  Problem: BPH (benign prostatic hyperplasia).  Plan: hold home dose of flomax  resume when patient can swallow.      Problem/Plan - 5:  ·  Problem: Goals of care, counseling/discussion.  Plan: d/w at length with wife and daughter want DNR/DNI.      Problem/Plan - 6:  Problem: Need for prophylactic measure. Plan: no DVT Lovenox for DVT ppx as just got TPA. Assessment and Plan:    Assessment:  · Assessment	  87 yo male from home HHA 12 hours ambulates with walker pmh of BPH, arthritis hypothyroidism, thoracic aneurysm  presented to the hospital with complaints of weakness, aphasia , poor responsiveness and unable to get up from toilet seat.  is being admitted to ICU for concern of CVA s/p TPA monitoring and for sepsis likely secondary to pna vs UTI.       Problem/Plan - 1:  ·  Problem: CVA (cerebral vascular accident).  Plan: cva  -presented with NIH score of 15  -NIH score difficult to assess this AM due to agitated state  -right hemiparesis and aphasia  -s/p tele stroke and TPA given @ 9am  -neuro scheck q2 hour  -c/w asa and statin therapy  -hydralazine prn for bp >185/105  -ct head: Moderate severe chronic small vessel ischemic changes in the frontal   parietal white matter and small chronic appearing right frontal cortical   infarct.  -f/u repeat CT in 24 hrs  -f/u ECHO  -ekg NSR FIRST Degree block left axis deviation  -npo f/u s/s eval  -Neuro: Dr. Pineda     Problem/Plan - 2:  ·  Problem: Sepsis.  Plan: sepsis  -wbc count of 12.5 lactate of 2.5  -cxr: Mild pulmonary vascular congestion. Small right basilar nodular opacity;   s/p 2 liter bolus  concernf or UTI too  c/w rocephin and azithromycin  f/u cultures and ua.      Problem/Plan - 3:  ·  Problem: Hypothyroidism.  Plan: on synthroid 100 at home  iv push half dose as NPO.      Problem/Plan - 4:  ·  Problem: BPH (benign prostatic hyperplasia).  Plan: hold home dose of flomax  resume when patient can swallow.      Problem/Plan - 5:  ·  Problem: Goals of care, counseling/discussion.  Plan: d/w at length with wife and daughter want DNR/DNI.      Problem/Plan - 6:  Problem: Need for prophylactic measure. Plan: no DVT Lovenox for DVT ppx as just got TPA. Assessment and Plan:    Assessment:  · Assessment	  87 yo male from home HHA 12 hours ambulates with walker pmh of BPH, arthritis hypothyroidism, thoracic aneurysm  presented to the hospital with complaints of weakness, aphasia , poor responsiveness and unable to get up from toilet seat.  is being admitted to ICU for concern of CVA s/p TPA monitoring and for sepsis likely secondary to pna vs UTI.       Problem/Plan - 1:  ·  Problem: CVA (cerebral vascular accident).  Plan: cva  -presented with NIH score of 15  -s/p tele stroke and TPA given @ 9am 6/12  -NIH score difficult to assess this AM due to agitated state  -right hemiparesis and aphasia  -neuro check q2 hour  -c/w asa therapy, begin statin when pt cleared for PO meds  -hydralazine prn for bp >185/105  -ct head: Moderate severe chronic small vessel ischemic changes in the frontal   parietal white matter and small chronic appearing right frontal cortical   infarct.  -Carotid doppler neg for sig stenosis  -f/u repeat CT in 24 hrs  -f/u ECHO  -ekg NSR FIRST Degree block left axis deviation  -npo f/u s/s eval  -Neuro: Dr. Pineda     Problem/Plan - 2:  ·  Problem: Sepsis.  Plan: sepsis  -wbc count of 12.5 lactate of 2.5  -UA neg  -cxr: Mild pulmonary vascular congestion. Small right basilar nodular opacity;   -c/w rocephin and azithromycin     Problem/Plan - 3:  ·  Problem: Hypothyroidism.  Plan: on synthroid 100 at home  -iv push half dose as NPO.      Problem/Plan - 4:  ·  Problem: BPH (benign prostatic hyperplasia).  Plan: hold home dose of flomax  -resume when patient can swallow.      Problem/Plan - 5:  ·  Problem: Goals of care, counseling/discussion.  Plan: d/w at length with wife and daughter -DNR/DNI     Problem/Plan - 6:  Problem: Need for prophylactic measure. Plan: no DVT Lovenox for DVT ppx as just got TPA.

## 2018-06-13 NOTE — CHART NOTE - NSCHARTNOTEFT_GEN_A_CORE
87 yo male from home HHA 12 hours ambulates with walker pmh of BPH, arthiritis hypothyroidism, thoracic aneurysm  presented to the hospital with complaints of weakness, aphasia , poor responsiveness and unable to get up from toilet seat. history obtained by family at bedside. According to his wife, he had been "shaking" for most of the night. On 6/12/18 he awoke and was able to walk to the bathroom with his walker as usual. He sat on the toilet, and then had difficulty getting off the toilet. There was diminished verbal output. wife said he used his left arm to try to get up but she felt his right side was weak. his eyes were closed and he was not responsive. she called EMS. ROS positive for increased urinary frequency and burning for past couple of days. no chest pain , SOB cough.    In the ED, Stroke code called on arrival at 7:15a, tele-stroke activated at 7:20a. VS STABEL EXCEPT tmax 102.4. ON TELE stroke patient had NIH scale of 15. b/l weakness greater on right side. code sepsis was also initiated. labs pertinent for wbc count of q12 , ekg nsr left axis. lactate of 2.4. cxr : Mild pulmonary vascular congestion. Small right basilar nodular opacity. ct head: Moderate severe chronic small vessel ischemic changes in the frontal parietal white matter and small chronic appearing right frontal cortical  infarct. TPA given at 9 am , and icu consult called.    Pt monitored in the ICU post tPa administration. Pt was very agitated requiring IV ativan for mild sedation.  Neuro deficits appeared to subside, but difficult to assess due to patients mental status.  CArotid doppler neg for sig stenosis.  Speech therapist could not perform bedside eval due to mental status.  Goals of care discussed: DNR/DNI.      Pt currently stable for downgrade to tele with plan as follows:   Problem/Plan - 1:  ·  Problem: CVA (cerebral vascular accident).  Plan: cva  -presented with NIH score of 15  -s/p tele stroke and TPA given @ 9am 6/12  -ekg NSR FIRST Degree block left axis deviation  -NIH score difficult to assess this AM due to agitated state  -right hemiparesis and aphasia  -neuro check q2 hour  -c/w asa therapy, begin statin when pt cleared for PO meds  -hydralazine prn for bp >185/105  -ct head: Moderate severe chronic small vessel ischemic changes in the frontal   parietal white matter and small chronic appearing right frontal cortical   infarct.  -Carotid doppler neg for sig stenosis  -f/u repeat CT in 24 hrs  -f/u ECHO  -npo f/u s/s eval  -Neuro: Dr. Pineda     Problem/Plan - 2:  ·  Problem: Sepsis.  Plan: sepsis 2/2 PAN  -wbc count of 12.5 lactate of 2.5  -UA neg  -cxr: Mild pulmonary vascular congestion. Small right basilar nodular opacity  -c/w rocephin and azithromycin     Problem/Plan - 3:  ·  Problem: Hypothyroidism.  Plan: on synthroid 100 at home  -iv push half dose as NPO.      Problem/Plan - 4:  ·  Problem: BPH (benign prostatic hyperplasia).  Plan: hold home dose of flomax  -resume when patient can swallow.      Problem/Plan - 5:  ·  Problem: Goals of care, counseling/discussion.  Plan: d/w at length with wife and daughter -DNR/DNI     Problem/Plan - 6:  Problem: Need for prophylactic measure. Plan: DVT prophy w/ sub q heparin 87 yo male from home HHA 12 hours ambulates with walker pmh of BPH, arthiritis hypothyroidism, thoracic aneurysm  presented to the hospital with complaints of weakness, aphasia , poor responsiveness and unable to get up from toilet seat. history obtained by family at bedside. According to his wife, he had been "shaking" for most of the night. On 6/12/18 he awoke and was able to walk to the bathroom with his walker as usual. He sat on the toilet, and then had difficulty getting off the toilet. There was diminished verbal output. wife said he used his left arm to try to get up but she felt his right side was weak. his eyes were closed and he was not responsive. she called EMS. ROS positive for increased urinary frequency and burning for past couple of days. no chest pain , SOB cough.    In the ED, Stroke code called on arrival at 7:15a, tele-stroke activated at 7:20a. VS STABEL EXCEPT tmax 102.4. ON TELE stroke patient had NIH scale of 15. b/l weakness greater on right side. code sepsis was also initiated. labs pertinent for wbc count of q12 , ekg nsr left axis. lactate of 2.4. cxr : Mild pulmonary vascular congestion. Small right basilar nodular opacity. ct head: Moderate severe chronic small vessel ischemic changes in the frontal parietal white matter and small chronic appearing right frontal cortical  infarct. TPA given at 9 am , and icu consult called.    Pt monitored in the ICU post tPa administration. Pt was very agitated requiring IV ativan for mild sedation.  Neuro deficits appeared to subside, but difficult to assess due to patients mental status.  Carotid doppler neg for sig stenosis.  Speech therapist could not perform bedside eval due to mental status.  Repeat CT head in 24hrs neg for acute stroke. Goals of care discussed: DNR/DNI.      Pt currently stable for downgrade to tele with plan as follows:   Problem/Plan - 1:  ·  Problem: CVA (cerebral vascular accident).  Plan: cva  -presented with NIH score of 15  -s/p tele stroke and TPA given @ 9am 6/12  -ekg NSR FIRST Degree block left axis deviation  -NIH score difficult to assess this AM due to agitated state  -right hemiparesis and aphasia  -neuro check q2 hour  -c/w asa therapy, begin statin when pt cleared for PO meds  -hydralazine prn for bp >185/105  -ct head: Moderate severe chronic small vessel ischemic changes in the frontal   parietal white matter and small chronic appearing right frontal cortical   infarct.  -Carotid doppler neg for sig stenosis  -Repeat CT head neg for acute stroke  -f/u MRI brain  -f/u ECHO  -npo f/u s/s eval  -Neuro: Dr. Pineda     Problem/Plan - 2:  ·  Problem: Sepsis.  Plan: sepsis 2/2 PAN  -wbc count of 12.5 lactate of 2.5  -UA neg  -cxr: Mild pulmonary vascular congestion. Small right basilar nodular opacity  -c/w rocephin and azithromycin     Problem/Plan - 3:  ·  Problem: Hypothyroidism.  Plan: on synthroid 100 at home  -iv push half dose as NPO.      Problem/Plan - 4:  ·  Problem: BPH (benign prostatic hyperplasia).  Plan: hold home dose of flomax  -resume when patient can swallow.      Problem/Plan - 5:  ·  Problem: Goals of care, counseling/discussion.  Plan: d/w at length with wife and daughter -DNR/DNI     Problem/Plan - 6:  Problem: Need for prophylactic measure. Plan: DVT prophy w/ sub q heparin 87 yo male from home HHA 12 hours ambulates with walker pmh of BPH, arthiritis hypothyroidism, thoracic aneurysm  presented to the hospital with complaints of weakness, aphasia , poor responsiveness and unable to get up from toilet seat. history obtained by family at bedside. According to his wife, he had been "shaking" for most of the night. On 6/12/18 he awoke and was able to walk to the bathroom with his walker as usual. He sat on the toilet, and then had difficulty getting off the toilet. There was diminished verbal output. wife said he used his left arm to try to get up but she felt his right side was weak. his eyes were closed and he was not responsive. she called EMS. ROS positive for increased urinary frequency and burning for past couple of days. no chest pain , SOB cough.    In the ED, Stroke code called on arrival at 7:15a, tele-stroke activated at 7:20a. VS STABEL EXCEPT tmax 102.4. ON TELE stroke patient had NIH scale of 15. b/l weakness greater on right side. code sepsis was also initiated. labs pertinent for wbc count of q12 , ekg nsr left axis. lactate of 2.4. cxr : Mild pulmonary vascular congestion. Small right basilar nodular opacity. ct head: Moderate severe chronic small vessel ischemic changes in the frontal parietal white matter and small chronic appearing right frontal cortical  infarct. TPA given at 9 am , and icu consult called.    Pt monitored in the ICU post tPa administration. Pt was very agitated requiring IV ativan for mild sedation.  Neuro deficits appeared to subside, but difficult to assess due to patients mental status.  Carotid doppler neg for sig stenosis.  Speech therapist could not perform bedside eval due to mental status.  Repeat CT head in 24hrs neg for acute stroke. Goals of care discussed: DNR/DNI.      Pt currently stable for downgrade to tele with plan as follows:   Problem/Plan - 1:  ·  Problem: CVA (cerebral vascular accident).  Plan: cva  -presented with NIH score of 15  -s/p tele stroke and TPA given @ 9am 6/12  -ekg NSR FIRST Degree block left axis deviation  -NIH score difficult to assess this AM due to agitated state  -right hemiparesis and aphasia  -neuro check q2 hour  -c/w asa therapy, begin statin when pt cleared for PO meds  -hydralazine prn for bp >185/105  -ct head: Moderate severe chronic small vessel ischemic changes in the frontal   parietal white matter and small chronic appearing right frontal cortical   infarct.  -Carotid doppler neg for sig stenosis  -Repeat CT head neg for acute stroke  -f/u MRI brain  -f/u ECHO  -npo f/u s/s eval  -Neuro: Dr. Pineda     Problem/Plan - 2:  ·  Problem: Sepsis.  Plan: sepsis 2/2 PAN  -wbc count of 12.5 lactate of 2.5  -UA neg  -cxr: Mild pulmonary vascular congestion. Small right basilar nodular opacity  -c/w rocephin and azithromycin     Problem/Plan - 3:  ·  Problem: Hypothyroidism.  Plan: on synthroid 100 at home  -iv push half dose as NPO.      Problem/Plan - 4:  ·  Problem: BPH (benign prostatic hyperplasia).  Plan: hold home dose of flomax  -resume when patient can swallow.      Problem/Plan - 5:  ·  Problem: Goals of care, counseling/discussion.  Plan: d/w at length with wife and daughter -DNR/DNI     Problem/Plan - 6:  Problem: Need for prophylactic measure. Plan: DVT prophy w/ sub q heparin    Agree with above assessment and plan as transcribed.

## 2018-06-13 NOTE — PROGRESS NOTE ADULT - ASSESSMENT
89 yo male from home HHA 12 hours ambulates with walker pmh of BPH, arthiritis hypothyroidism, thoracic aneurysm  presented to the hospital with complaints of weakness, aphasia , poor resposiveness and unable to get up from toilet seat.  is being admitted to ICU for concern of CVA s/p TPA monitoring and for sepsis likely secondary to pna vs UTI.

## 2018-06-13 NOTE — CONSULT NOTE ADULT - SUBJECTIVE AND OBJECTIVE BOX
Patient is a 89y old  Male who presents with a chief complaint of weakness. aphasia (2018 10:18)      HPI:  89 yo male from home HHA 12 hours ambulates with walker pmh of BPH, arthiritis hypothyroidism, thoracic aneurysm  presented to the hospital with complaints of weakness, aphasia , poor resposiveness and unable to get up from toilet seat. history obtained by family at bedside. According to his wife, he had been "shaking" for most of the night. On 18 he awoke and was able to walk to the bathroom with his walker as usual. He sat on the toilet, and then had difficulty getting off the toiletThere was diminished verbal output. wife said he used his left arm to try to get up but she felt his right side was weak. his eyes were closed and he was not responsive. she called EMS. ROS positive for increased urianry frequency and buring for past couple of days. no chest pain , SOB cough.    in the ER Stroke code called on arrival at 7:15a, tele-stroke activated at 7:20a. VS STABEL EXCEPT tmax 102.4. ON TELE stroke patient had NIH scale of 15. b/l weakness greater on right side. code sepsis was also initiaated. labs pertinent for wbc count of q12 , ekg nsr left axis. lactate of 2.4. cxr : Mild pulmonary vascular congestion. Small right basilar nodular opacity. 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 TPA given at 9 am , and icu consult called.   and unable to get up from toilet seat. history obtained by family at bedside. According to his wife, he had been "shaking" for most of the night. On 18 he awoke and was able to walk to the bathroom with his walker as usual. He sat on the toilet, and then had difficulty getting off the toiletThere was diminished verbal output. wife said he used his left arm to try to get up but she felt his right side was weak. his eyes were closed and he was not responsive. she called EMS. ROS positive for increased urianry frequency and buring for past couple of days. no chest pain , SOB cough.    in the ER Stroke code called on arrival at 7:15a, tele-stroke activated at 7:20a. VS STABEL EXCEPT tmax 102.4. ON TELE stroke patient had NIH scale of 15. b/l weakness greater on right side. code sepsis was also initiaated. labs pertinent for wbc count of q12 , ekg nsr left axis. lactate of 2.4. cxr : Mild pulmonary vascular congestion. Small right basilar nodular opacity. 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 TPA given at 9 am , and icu consult called. (2018 10:18)           The patient was last know well at  The patient lives at home/ NH.  The patient walks without assistance/ with a cane or walker    Neurological Review of Systems:  No difficulty with language.  No vision loss or double vision.  No dizziness, vertigo or new hearing loss.  No difficulty with speech or swallowing.  No focal weakness.  No focal sensory changes.  No numbness or tingling in the bilateral lower extremities.  No difficulty with balance.  No difficulty with ambulation.      MEDICATIONS  (STANDING):  aspirin Suppository 300 milliGRAM(s) Rectal daily  azithromycin  IVPB 500 milliGRAM(s) IV Intermittent every 24 hours  azithromycin  IVPB      cefTRIAXone   IVPB 1 Gram(s) IV Intermittent every 24 hours  chlorhexidine 4% Liquid 1 Application(s) Topical <User Schedule>  dextrose 5% + sodium chloride 0.9%. 1000 milliLiter(s) (70 mL/Hr) IV Continuous <Continuous>  haloperidol    Injectable 1 milliGRAM(s) IntraMuscular once  heparin  Injectable 5000 Unit(s) SubCutaneous every 8 hours  hydrALAZINE Injectable 10 milliGRAM(s) IV Push every 8 hours  levothyroxine Injectable 50 MICROGram(s) IV Push at bedtime    MEDICATIONS  (PRN):  acetaminophen  Suppository 650 milliGRAM(s) Rectal every 6 hours PRN For Temp greater than 38 C (100.4 F)    Allergies    No Known Allergies    Intolerances      PAST MEDICAL & SURGICAL HISTORY:  Neurogenic bladder  Hypothyroidism    FAMILY HISTORY:    SOCIAL HISTORY: non smoker/ former smoker/ active smoker    Review of Systems:  Constitutional: No generalized weakness. No fevers or chills.                    Eyes, Ears, Mouth, Throat: No vision loss   Respiratory: No shortness of breath or cough.                                Cardiovascular: No chest pain or palpitations  Gastrointestinal: No nausea or vomiting.                                         Genitourinary: No urinary incontinence or burning on urination.  Musculoskeletal: No joint pain.                                                           Dermatologic: No rash.  Neurological: as per HPI                                                                      Psychiatric: No behavioral problems.  Endocrine: No known hypoglycemia.               Hematologic/Lymphatic: No easy bleeding.    O:  Vital Signs Last 24 Hrs  T(C): 37.5 (2018 08:00), Max: 39.5 (2018 16:00)  T(F): 99.5 (2018 08:00), Max: 103.1 (2018 16:00)  HR: 51 (2018 10:19) (35 - 105)  BP: 107/39 (2018 10:19) (86/35 - 164/61)  BP(mean): 67 (2018 10:00) (48 - 85)  RR: 21 (2018 10:19) (14 - 30)  SpO2: 94% (2018 10:19) (86% - 100%)    General Exam:   General appearance: No acute distress                 Cardiovascular: Pedal dorsalis pulses intact bilaterally    Neurological Exam:  NIH Stroke Scale (NIHSS):   1a. LOConscious:  0-alert 1-lethargy 2-obtund 3-coma:    _____  1b. LOC Questions:  0-both 1-one 2-none                       _____  1c. LOC Commands:  0-both 1-one 2-none                     _____  2.   Gaze:  0-nl 1-partial 2-conjugate                                _____  3.   Visual:  0-nl 1-part ian 2-full ian 3-bilat ian         _____  4.   Facial Palsy:  0-nl 1-minor 2-part 3-complete             _____  5.   Motor Arm:  0-nl 1-drift 2-effort 3-no effort         Left             _____                              4-no move UN-amputated                     Right  _____  6.   Motor Leg:                                                                 Left   _____                                                                                   Right _____  7.   Ataxia:  0-nl 1-one limb 2-two UN-amp                      _____  8.   Sensory:  0-nl 1-mild 2-severe                                  _____  9.   Language:  0-nl 1-mild 2-severe 3-mute                     _____  10.  Dysarthria:  0-nl 1-mild 2-severe 3-barrier                  _____  11.  Extinction/Inattention:  0-nl 1-mild 2-deep                 _____         TOTAL NIHSS       ________    Mental Status: Orientated to self, date and place.  Attention intact.  No dysarthria, aphasia or neglect.  Knowledge intact.  Registration intact.  Short and long term memory grossly intact.      Cranial Nerves: CN I - not tested.  PERRL, EOMI, VFF, no nystagmus or diplopia.  No APD.  Fundi not visualized bilaterally.  CN V1-3 intact to light touch and pinprick.  No facial asymmetry.  Hearing intact to finger rub bilaterally.  Tongue, uvula and palate midline.  Sternocleidomastoid and Trapezius intact bilaterally.    Motor:   Tone: normal.                  Strength intact throughout  No pronator drift bilaterally                      No dysmetria on finger-nose-finger or heel-shin-heel  No truncal ataxia.  No resting, postural or action tremor.  No myoclonus.    Sensation: intact to light touch, pinprick, vibration and proprioception    Deep Tendon Reflexes: 1+ bilateral biceps, triceps, brachioradialis, knee and ankle  Toes flexor bilaterally    Gait: normal and stable.  Rhomberg -merissa.    Other:      LABS:                        10.2   12.5  )-----------( 199      ( 2018 07:53 )             32.7     06-12    132<L>  |  99  |  28<H>  ----------------------------<  133<H>  4.0   |  28  |  0.90    Ca    8.7      2018 07:53    TPro  7.2  /  Alb  2.7<L>  /  TBili  0.8  /  DBili  x   /  AST  16  /  ALT  17  /  AlkPhos  85  06-12    PT/INR - ( 2018 07:53 )   PT: 13.2 sec;   INR: 1.21 ratio         PTT - ( 2018 07:53 )  PTT:27.3 sec  Urinalysis Basic - ( 2018 16:51 )    Color: Yellow / Appearance: Clear / S.015 / pH: x  Gluc: x / Ketone: Negative  / Bili: Negative / Urobili: Negative   Blood: x / Protein: 15 / Nitrite: Negative   Leuk Esterase: Negative / RBC: 10-25 /HPF / WBC 3-5 /HPF   Sq Epi: x / Non Sq Epi: Occasional /HPF / Bacteria: Trace /HPF      LDL  HbA1C    RADIOLOGY & ADDITIONAL STUDIES:    EKG:  < from: CT Brain Stroke Protocol (18 @ 07:32) >  IMPRESSION:    Motion degraded exam, particularly at the level of the posterior fossa.   No gross acute intracranial hemorrhage, mass effect, or CT evidence of a   large acute transcortical infarct.    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    < end of copied text >    < from: US Duplex Carotid Arteries Complete, Bilateral (18 @ 15:27) >    Impression: No hemodynamically significant internal carotid artery   stenosis by velocity criteria.    < end of copied text >      Impression:       Recommendations:  1.             Admit to telemetry   2.             MRI brain, MRA head without contrast, Carotid duplex (CD).  If unable to get MR imaging, please consider CTA head and neck in 24hours (no need for CD in this case).  If the patient is unable to get MR and unable to get IV contrast please repeat the CTH in 24hours and get a CD.  3.             TTE  4.             Please check HbA1C and fasting lipid profile  5.             Start ASA 81mg (or ASA 325mg rectally) and Lipitor 40mg HS  6.             BP goal of normal/ permissive HTN of SBP <200/<180<160  7.             NS at 125 cc/h/ D5 NS at 125 cc/hour, if NPO, if cardiac function allows, to ensure good perfusion  8.             Frequent neurochecks  9.             Urine Tox  10.          Able to resume normal diet as passed bedside swallowing test/ NPO for now  11.          Formal speech and swallow evaluation  12.          PT evaluation  13.          STAT CTH IF the patient has sudden change in mental status or neurological exam  14.          DVT PPx    Thank you for the courtesy of this consult. Patient is a 89y old  Male who presents with a chief complaint of weakness. aphasia (2018 10:18)  Hx obtained from chart, patient unable to give Hx      HPI:  89 yo male from home HHA 12 hours ambulates with walker pmh of BPH, arthiritis hypothyroidism, thoracic aneurysm  presented to the hospital with complaints of weakness, aphasia , poor resposiveness and unable to get up from toilet seat. history obtained by family at bedside. According to his wife, he had been "shaking" for most of the night. On 18 he awoke and was able to walk to the bathroom with his walker as usual. He sat on the toilet, and then had difficulty getting off the toiletThere was diminished verbal output. wife said he used his left arm to try to get up but she felt his right side was weak. his eyes were closed and he was not responsive. she called EMS. ROS positive for increased urianry frequency and buring for past couple of days. no chest pain , SOB cough.    in the ER Stroke code called on arrival at 7:15a, tele-stroke activated at 7:20a. VS STABEL EXCEPT tmax 102.4. ON TELE stroke patient had NIH scale of 15. b/l weakness greater on right side. code sepsis was also initiaated. labs pertinent for wbc count of q12 , ekg nsr left axis. lactate of 2.4. cxr : Mild pulmonary vascular congestion. Small right basilar nodular opacity. 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 TPA given at 9 am , and icu consult called.     Overnight, the patient received sedating medication for aggitation, he was also transfered to the ICU.      MEDICATIONS  (STANDING):  aspirin Suppository 300 milliGRAM(s) Rectal daily  azithromycin  IVPB 500 milliGRAM(s) IV Intermittent every 24 hours  azithromycin  IVPB      cefTRIAXone   IVPB 1 Gram(s) IV Intermittent every 24 hours  chlorhexidine 4% Liquid 1 Application(s) Topical <User Schedule>  dextrose 5% + sodium chloride 0.9%. 1000 milliLiter(s) (70 mL/Hr) IV Continuous <Continuous>  haloperidol    Injectable 1 milliGRAM(s) IntraMuscular once  heparin  Injectable 5000 Unit(s) SubCutaneous every 8 hours  hydrALAZINE Injectable 10 milliGRAM(s) IV Push every 8 hours  levothyroxine Injectable 50 MICROGram(s) IV Push at bedtime    MEDICATIONS  (PRN):  acetaminophen  Suppository 650 milliGRAM(s) Rectal every 6 hours PRN For Temp greater than 38 C (100.4 F)    Allergies    No Known Allergies    Intolerances      PAST MEDICAL & SURGICAL HISTORY:  Neurogenic bladder  Hypothyroidism    FAMILY HISTORY: NC parents    SOCIAL HISTORY: unknown if smoker    Review of Systems:  Constitutional: +fevers.                    Respiratory: No cough.                                Gastrointestinal: No  vomiting.                                         Genitourinary: + urinary incontinence   Neurological: as per HPI                                                                      Psychiatric: + aggitation    O:  Vital Signs Last 24 Hrs  T(C): 37.5 (2018 08:00), Max: 39.5 (2018 16:00)  T(F): 99.5 (2018 08:00), Max: 103.1 (2018 16:00)  HR: 51 (2018 10:19) (35 - 105)  BP: 107/39 (2018 10:19) (86/35 - 164/61)  BP(mean): 67 (2018 10:00) (48 - 85)  RR: 21 (2018 10:19) (14 - 30)  SpO2: 94% (2018 10:19) (86% - 100%)    General Exam:   General appearance: No acute distress                 Cardiovascular: Pedal dorsalis pulses intact bilaterally    Mental Status: Eyes closed, no response to verbal stimuli or light touch.  Arousable to sternal rub.  Attention impaired.  Unable to assess dysarthria, aphasia or neglect, Knowledge,  Registration or memory.      Cranial Nerves: CN I - not tested.  PERRL, midline, unable to assess EOM, blinks to threat bl temporal areas.  Fundi not visualized bilaterally.  CN V1-3: unable to assess due to mental status.  No gross facial asymmetry.  Hearing: unable to assess due to mental status.  Tongue, uvula and palate: unable to assess due to mental status. .  Sternocleidomastoid and Trapezius: unable to assess due to mental status.     Motor:   Tone: normal.                  Strength: moves bl arms and legs to painful stimuli                 dysmetria on finger-nose-finger or heel-shin-heel: unable to assess due to mental status.     Sensation: intact to deep pain in all 4 ext    Deep Tendon Reflexes: 1+ bilateral biceps, triceps, brachioradialis, knee  Toes flexor bilaterally    Gait: unable to assess due to mental status.     Other:      LABS:                        10.2   12.5  )-----------( 199      ( 2018 07:53 )             32.7     06-12    132<L>  |  99  |  28<H>  ----------------------------<  133<H>  4.0   |  28  |  0.90    Ca    8.7      2018 07:53    TPro  7.2  /  Alb  2.7<L>  /  TBili  0.8  /  DBili  x   /  AST  16  /  ALT  17  /  AlkPhos  85  06-12    PT/INR - ( 2018 07:53 )   PT: 13.2 sec;   INR: 1.21 ratio         PTT - ( 2018 07:53 )  PTT:27.3 sec  Urinalysis Basic - ( 2018 16:51 )    Color: Yellow / Appearance: Clear / S.015 / pH: x  Gluc: x / Ketone: Negative  / Bili: Negative / Urobili: Negative   Blood: x / Protein: 15 / Nitrite: Negative   Leuk Esterase: Negative / RBC: 10-25 /HPF / WBC 3-5 /HPF   Sq Epi: x / Non Sq Epi: Occasional /HPF / Bacteria: Trace /HPF      LDL  HbA1C    RADIOLOGY & ADDITIONAL STUDIES:    EKG:   < from: CT Brain Stroke Protocol (18 @ 07:32) >  IMPRESSION:    Motion degraded exam, particularly at the level of the posterior fossa.   No gross acute intracranial hemorrhage, mass effect, or CT evidence of a   large acute transcortical infarct.    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    < end of copied text >    < from: US Duplex Carotid Arteries Complete, Bilateral (18 @ 15:27) >    Impression: No hemodynamically significant internal carotid artery   stenosis by velocity criteria.    < end of copied text >

## 2018-06-13 NOTE — PROGRESS NOTE ADULT - SUBJECTIVE AND OBJECTIVE BOX
PGY 1 Note discussed with supervising resident and primary attending    Patient is a 89y old  Male who presents with a chief complaint of weakness. aphasia (2018 10:18)    INTERVAL HPI/OVERNIGHT EVENTS: Overnight pt was very agitated requiring ativan.  Today pt presents in no acute distress.  Pt found resting comfortably in bed.    MEDICATIONS  (STANDING):  azithromycin  IVPB 500 milliGRAM(s) IV Intermittent every 24 hours  azithromycin  IVPB      cefTRIAXone   IVPB 1 Gram(s) IV Intermittent every 24 hours  chlorhexidine 4% Liquid 1 Application(s) Topical <User Schedule>  dextrose 5% + sodium chloride 0.9%. 1000 milliLiter(s) (70 mL/Hr) IV Continuous <Continuous>  hydrALAZINE Injectable 10 milliGRAM(s) IV Push every 8 hours  levothyroxine Injectable 50 MICROGram(s) IV Push at bedtime    MEDICATIONS  (PRN):  acetaminophen  Suppository 650 milliGRAM(s) Rectal every 6 hours PRN For Temp greater than 38 C (100.4 F)  LORazepam   Injectable 1 milliGRAM(s) IV Push every 4 hours PRN Agitation    __________________________________________________  REVIEW OF SYSTEMS: Unable to assess    Vital Signs Last 24 Hrs  T(C): 37.6 (2018 00:12), Max: 39.5 (2018 16:00)  T(F): 99.6 (2018 00:12), Max: 103.1 (2018 16:00)  HR: 65 (2018 06:00) (35 - 105)  BP: 139/46 (2018 06:00) (86/35 - 161/45)  BP(mean): 71 (2018 06:00) (48 - 85)  RR: 22 (2018 06:00) (14 - 30)  SpO2: 92% (2018 06:00) (86% - 100%)    ________________________________________________  PHYSICAL EXAM:  GENERAL: NAD  HEENT: Normocephalic;  conjunctivae and sclerae clear; moist mucous membranes;   NECK : supple  CHEST/LUNG: Clear to auscultation bilaterally with good air entry   HEART: S1 S2  regular; no murmurs, gallops or rubs  ABDOMEN: Soft, Nontender, Nondistended; Bowel sounds present  EXTREMITIES: no cyanosis; no edema; no calf tenderness  NERVOUS SYSTEM:  Awake and alert; Oriented  to place, person and time ; no new deficits    _________________________________________________  LABS:        Urinalysis Basic - ( 2018 16:51 )    Color: Yellow / Appearance: Clear / S.015 / pH: x  Gluc: x / Ketone: Negative  / Bili: Negative / Urobili: Negative   Blood: x / Protein: 15 / Nitrite: Negative   Leuk Esterase: Negative / RBC: 10-25 /HPF / WBC 3-5 /HPF   Sq Epi: x / Non Sq Epi: Occasional /HPF / Bacteria: Trace /HPF      CAPILLARY BLOOD GLUCOSE  142 (2018 09:13)      POCT Blood Glucose.: 142 mg/dL (2018 07:18)        RADIOLOGY & ADDITIONAL TESTS:    Imaging Personally Reviewed:  YES/NO    Consultant(s) Notes Reviewed:   YES/ No    Care Discussed with Consultants :     Plan of care was discussed with patient and /or primary care giver; all questions and concerns were addressed and care was aligned with patient's wishes. PGY 1 Note discussed with supervising resident and primary attending    Patient is a 89y old  Male who presents with a chief complaint of weakness. aphasia (2018 10:18)    INTERVAL HPI/OVERNIGHT EVENTS: Overnight pt was very agitated requiring ativan.  Today pt presents in no acute distress.  Pt found resting comfortably in bed, but still agitated.  NO new complaints reported today.     MEDICATIONS  (STANDING):  azithromycin  IVPB 500 milliGRAM(s) IV Intermittent every 24 hours  azithromycin  IVPB      cefTRIAXone   IVPB 1 Gram(s) IV Intermittent every 24 hours  chlorhexidine 4% Liquid 1 Application(s) Topical <User Schedule>  dextrose 5% + sodium chloride 0.9%. 1000 milliLiter(s) (70 mL/Hr) IV Continuous <Continuous>  hydrALAZINE Injectable 10 milliGRAM(s) IV Push every 8 hours  levothyroxine Injectable 50 MICROGram(s) IV Push at bedtime    MEDICATIONS  (PRN):  acetaminophen  Suppository 650 milliGRAM(s) Rectal every 6 hours PRN For Temp greater than 38 C (100.4 F)  LORazepam   Injectable 1 milliGRAM(s) IV Push every 4 hours PRN Agitation    __________________________________________________  REVIEW OF SYSTEMS: Unable to assess    Vital Signs Last 24 Hrs  T(C): 37.6 (2018 00:12), Max: 39.5 (2018 16:00)  T(F): 99.6 (2018 00:12), Max: 103.1 (2018 16:00)  HR: 65 (2018 06:00) (35 - 105)  BP: 139/46 (2018 06:00) (86/35 - 161/45)  BP(mean): 71 (2018 06:00) (48 - 85)  RR: 22 (2018 06:00) (14 - 30)  SpO2: 92% (2018 06:00) (86% - 100%)    ________________________________________________  PHYSICAL EXAM:  GENERAL: NAD  HEENT: Normocephalic;  conjunctivae and sclerae clear; moist mucous membranes;   NECK : supple  CHEST/LUNG: Clear to auscultation bilaterally with good air entry   HEART: S1 S2  regular; no murmurs, gallops or rubs  ABDOMEN: Soft, Nontender, Nondistended; Bowel sounds present  EXTREMITIES: no cyanosis; no edema; no calf tenderness  NERVOUS SYSTEM:  Awake and alert; Oriented  to place, person and time ; no new deficits    _________________________________________________  LABS:        Urinalysis Basic - ( 2018 16:51 )    Color: Yellow / Appearance: Clear / S.015 / pH: x  Gluc: x / Ketone: Negative  / Bili: Negative / Urobili: Negative   Blood: x / Protein: 15 / Nitrite: Negative   Leuk Esterase: Negative / RBC: 10-25 /HPF / WBC 3-5 /HPF   Sq Epi: x / Non Sq Epi: Occasional /HPF / Bacteria: Trace /HPF      CAPILLARY BLOOD GLUCOSE  142 (2018 09:13)      POCT Blood Glucose.: 142 mg/dL (2018 07:18)        RADIOLOGY & ADDITIONAL TESTS:    Imaging Personally Reviewed:  YES/NO    Consultant(s) Notes Reviewed:   YES/ No    Care Discussed with Consultants :     Plan of care was discussed with patient and /or primary care giver; all questions and concerns were addressed and care was aligned with patient's wishes.

## 2018-06-13 NOTE — PROGRESS NOTE ADULT - PROBLEM SELECTOR PLAN 1
cva  presented with NIH score of 15  right hemiparesis and aphasia  s/p tele stroke and TPA given  monitor in ICU  neuro scheck q2 hour  neurology evaluation appreciated. repeat CT in 24 hours.  start on statin when able to tolerate oral . s/p TPA  hydralazine prn for bp >185/105  ct head: Moderate severe chronic small vessel ischemic changes in the frontal   parietal white matter and small chronic appearing right frontal cortical   infarct.  f/u carotid doppler and ECHO  ekg NSR FIRST Degree block left axis deviation  npo f/u s/s eval

## 2018-06-13 NOTE — PROGRESS NOTE ADULT - PROBLEM SELECTOR PLAN 2
sepsis  cxr: Mild pulmonary vascular congestion. Small right basilar nodular opacity;   consider follow    UTI   continue antibiotics.

## 2018-06-13 NOTE — CHART NOTE - NSCHARTNOTEFT_GEN_A_CORE
Neuro addendum:  < from: CT Head No Cont (06.13.18 @ 13:39) >      IMPRESSION: Motion degraded study.  No acute intracranial hemorrhage or mass effect. Further evaluation with   MRI may be performed as clinically indicated.      < end of copied text >

## 2018-06-13 NOTE — PROGRESS NOTE ADULT - SUBJECTIVE AND OBJECTIVE BOX
CHIEF COMPLAINT:Patient is a 89y old  Male who presents with a chief complaint of weakness. aphasia (2018 10:18)    	  REVIEW OF SYSTEMS:  CONSTITUTIONAL: No fever, weight loss, or fatigue  EYES: No eye pain, visual disturbances, or discharge  ENMT:  No difficulty hearing, tinnitus, vertigo; No sinus or throat pain  NECK: No pain or stiffness  RESPIRATORY: No cough, wheezing, chills or hemoptysis; No Shortness of Breath  CARDIOVASCULAR: No chest pain, palpitations, passing out, dizziness, or leg swelling  GASTROINTESTINAL: No abdominal or epigastric pain. No nausea, vomiting, or hematemesis; No diarrhea or constipation. No melena or hematochezia.  GENITOURINARY: No dysuria, frequency, hematuria, or incontinence  NEUROLOGICAL: No headaches, memory loss, loss of strength, numbness, or tremors  SKIN: No itching, burning, rashes, or lesions   LYMPH Nodes: No enlarged glands  ENDOCRINE: No heat or cold intolerance; No hair loss  MUSCULOSKELETAL: No joint pain or swelling; No muscle, back, or extremity pain  PSYCHIATRIC: No depression, anxiety, mood swings, or difficulty sleeping  HEME/LYMPH: No easy bruising, or bleeding gums  ALLERY AND IMMUNOLOGIC: No hives or eczema	    [ ] All others negative	  [ ] Unable to obtain    PHYSICAL EXAM:  T(C): 38.5 (18 @ 20:24), Max: 39 (18 @ 22:00)  HR: 59 (18 @ 20:24) (35 - 68)  BP: 127/46 (18 @ 20:24) (86/35 - 164/61)  RR: 22 (18 @ 20:24) (17 - 30)  SpO2: 95% (18 @ 20:24) (86% - 100%)  Wt(kg): --  I&O's Summary    2018 07:  -  2018 07:00  --------------------------------------------------------  IN: 1380 mL / OUT: 781 mL / NET: 599 mL    2018 07:01  -  2018 21:22  --------------------------------------------------------  IN: 810 mL / OUT: 27 mL / NET: 783 mL        Appearance: Normal	  HEENT:   Normal oral mucosa, PERRL, EOMI	  Lymphatic: No lymphadenopathy  Cardiovascular: Normal S1 S2, No JVD, No murmurs, No edema  Respiratory: Lungs clear to auscultation	  Psychiatry: A & O x 3, Mood & affect appropriate  Gastrointestinal:  Soft, Non-tender, + BS	  Skin: No rashes, No ecchymoses, No cyanosis	  Neurologic: Non-focal  Extremities: Normal range of motion, No clubbing, cyanosis or edema  Vascular: Peripheral pulses palpable 2+ bilaterally    MEDICATIONS  (STANDING):  aspirin Suppository 300 milliGRAM(s) Rectal daily  atorvastatin 40 milliGRAM(s) Oral at bedtime  azithromycin  IVPB 500 milliGRAM(s) IV Intermittent every 24 hours  azithromycin  IVPB      cefTRIAXone   IVPB 1 Gram(s) IV Intermittent every 24 hours  chlorhexidine 4% Liquid 1 Application(s) Topical <User Schedule>  dextrose 5% + sodium chloride 0.9%. 1000 milliLiter(s) (70 mL/Hr) IV Continuous <Continuous>  heparin  Injectable 5000 Unit(s) SubCutaneous every 8 hours  hydrALAZINE Injectable 10 milliGRAM(s) IV Push every 8 hours  levothyroxine Injectable 50 MICROGram(s) IV Push at bedtime      TELEMETRY: 	    ECG:  	  RADIOLOGY:  OTHER: 	  	  CBC Full  -  ( 2018 12:14 )  WBC Count : 12.2 K/uL  Hemoglobin : 10.2 g/dL  Hematocrit : 32.6 %  Platelet Count - Automated : 115 K/uL  Mean Cell Volume : 108.9 fl  Mean Cell Hemoglobin : 34.1 pg  Mean Cell Hemoglobin Concentration : 31.3 gm/dL  Auto Neutrophil # : 10.1 K/uL  Auto Lymphocyte # : 1.3 K/uL  Auto Monocyte # : 0.6 K/uL  Auto Eosinophil # : 0.0 K/uL  Auto Basophil # : 0.1 K/uL  Auto Neutrophil % : 82.8 %  Auto Lymphocyte % : 10.8 %  Auto Monocyte % : 5.3 %  Auto Eosinophil % : 0.0 %  Auto Basophil % : 1.1 %        CARDIAC MARKERS:  CARDIAC MARKERS ( 2018 07:53 )  0.043 ng/mL / x     / x     / x     / x                                  10.2   12.2  )-----------( 115      ( 2018 12:14 )             32.6           133<L>  |  104  |  27<H>  ----------------------------<  115<H>  4.4   |  22  |  0.83    Ca    7.9<L>      2018 12:14  Phos  2.3       Mg     1.9         TPro  6.8  /  Alb  2.3<L>  /  TBili  0.9  /  DBili  x   /  AST  38  /  ALT  15  /  AlkPhos  67  13      PT/INR - ( 2018 07:53 )   PT: 13.2 sec;   INR: 1.21 ratio         PTT - ( 2018 07:53 )  PTT:27.3 sec    Urinalysis Basic - ( 2018 16:51 )    Color: Yellow / Appearance: Clear / S.015 / pH: x  Gluc: x / Ketone: Negative  / Bili: Negative / Urobili: Negative   Blood: x / Protein: 15 / Nitrite: Negative   Leuk Esterase: Negative / RBC: 10-25 /HPF / WBC 3-5 /HPF   Sq Epi: x / Non Sq Epi: Occasional /HPF / Bacteria: Trace /HPF      proBNP:   Lipid Profile: Cholesterol 105  LDL 47  HDL 43  TG 76  Cholesterol 118  LDL 55  HDL 48  TG 76    HgA1c: Hemoglobin A1C, Whole Blood: 5.2 % ( @ 18:32)    TSH: Thyroid Stimulating Hormone, Serum: 1.76 uU/mL ( @ 12:14)

## 2018-06-14 LAB
ANION GAP SERPL CALC-SCNC: 6 MMOL/L — SIGNIFICANT CHANGE UP (ref 5–17)
BUN SERPL-MCNC: 29 MG/DL — HIGH (ref 7–18)
CALCIUM SERPL-MCNC: 8.2 MG/DL — LOW (ref 8.4–10.5)
CHLORIDE SERPL-SCNC: 106 MMOL/L — SIGNIFICANT CHANGE UP (ref 96–108)
CO2 SERPL-SCNC: 26 MMOL/L — SIGNIFICANT CHANGE UP (ref 22–31)
CREAT SERPL-MCNC: 0.81 MG/DL — SIGNIFICANT CHANGE UP (ref 0.5–1.3)
GLUCOSE BLDC GLUCOMTR-MCNC: 109 MG/DL — HIGH (ref 70–99)
GLUCOSE SERPL-MCNC: 107 MG/DL — HIGH (ref 70–99)
HCT VFR BLD CALC: 27.9 % — LOW (ref 39–50)
HGB BLD-MCNC: 8.6 G/DL — LOW (ref 13–17)
MAGNESIUM SERPL-MCNC: 2 MG/DL — SIGNIFICANT CHANGE UP (ref 1.6–2.6)
MCHC RBC-ENTMCNC: 30.8 GM/DL — LOW (ref 32–36)
MCHC RBC-ENTMCNC: 33.5 PG — SIGNIFICANT CHANGE UP (ref 27–34)
MCV RBC AUTO: 108.9 FL — HIGH (ref 80–100)
PHOSPHATE SERPL-MCNC: 2.4 MG/DL — LOW (ref 2.5–4.5)
PLATELET # BLD AUTO: 135 K/UL — LOW (ref 150–400)
POTASSIUM SERPL-MCNC: 3.6 MMOL/L — SIGNIFICANT CHANGE UP (ref 3.5–5.3)
POTASSIUM SERPL-SCNC: 3.6 MMOL/L — SIGNIFICANT CHANGE UP (ref 3.5–5.3)
RBC # BLD: 2.56 M/UL — LOW (ref 4.2–5.8)
RBC # FLD: 20.9 % — HIGH (ref 10.3–14.5)
SODIUM SERPL-SCNC: 138 MMOL/L — SIGNIFICANT CHANGE UP (ref 135–145)
WBC # BLD: 10.1 K/UL — SIGNIFICANT CHANGE UP (ref 3.8–10.5)
WBC # FLD AUTO: 10.1 K/UL — SIGNIFICANT CHANGE UP (ref 3.8–10.5)

## 2018-06-14 PROCEDURE — 70551 MRI BRAIN STEM W/O DYE: CPT | Mod: 26

## 2018-06-14 RX ADMIN — Medication 1 MILLIGRAM(S): at 17:45

## 2018-06-14 RX ADMIN — AZITHROMYCIN 250 MILLIGRAM(S): 500 TABLET, FILM COATED ORAL at 12:28

## 2018-06-14 RX ADMIN — CEFTRIAXONE 100 GRAM(S): 500 INJECTION, POWDER, FOR SOLUTION INTRAMUSCULAR; INTRAVENOUS at 15:12

## 2018-06-14 RX ADMIN — Medication 650 MILLIGRAM(S): at 20:01

## 2018-06-14 RX ADMIN — HEPARIN SODIUM 5000 UNIT(S): 5000 INJECTION INTRAVENOUS; SUBCUTANEOUS at 05:50

## 2018-06-14 RX ADMIN — CHLORHEXIDINE GLUCONATE 1 APPLICATION(S): 213 SOLUTION TOPICAL at 05:50

## 2018-06-14 RX ADMIN — Medication 300 MILLIGRAM(S): at 12:31

## 2018-06-14 RX ADMIN — Medication 10 MILLIGRAM(S): at 15:13

## 2018-06-14 RX ADMIN — HEPARIN SODIUM 5000 UNIT(S): 5000 INJECTION INTRAVENOUS; SUBCUTANEOUS at 22:04

## 2018-06-14 RX ADMIN — HEPARIN SODIUM 5000 UNIT(S): 5000 INJECTION INTRAVENOUS; SUBCUTANEOUS at 15:12

## 2018-06-14 RX ADMIN — Medication 50 MICROGRAM(S): at 22:04

## 2018-06-14 NOTE — SWALLOW BEDSIDE ASSESSMENT ADULT - SWALLOW EVAL: RECOMMENDED FEEDING/EATING TECHNIQUES
alternate food with liquid/allow for swallow between intakes/check mouth frequently for oral residue/pocketing/crush medication (when feasible)/position upright (90 degrees)/small sips/bites/maintain upright posture during/after eating for 30 mins/hard swallow w/ each bite or sip/oral hygiene

## 2018-06-14 NOTE — SWALLOW BEDSIDE ASSESSMENT ADULT - ASR SWALLOW ASPIRATION MONITOR
fever/pneumonia/throat clearing/upper respiratory infection/cough/gurgly voice/change of breathing pattern/position upright (90Y)/oral hygiene

## 2018-06-14 NOTE — SWALLOW BEDSIDE ASSESSMENT ADULT - MODE OF PRESENTATION
spoon/self fed/with maximal clinician assistance straw/with maximal clinician assistance/self fed/cup spoon

## 2018-06-14 NOTE — PROGRESS NOTE ADULT - SUBJECTIVE AND OBJECTIVE BOX
DNR [ x]   DNI  [ x ]    INTERVAL HPI/OVERNIGHT EVENTS: Downgraded to SCU overnight, no acute events    PRESSORS: [ ] YES [x ] NO  WHICH:    ANTIBIOTICS:                  DATE STARTED:  ANTIBIOTICS:                  DATE STARTED:  ANTIBIOTICS:                  DATE STARTED:    azithromycin  IVPB 500 milliGRAM(s) IV Intermittent every 24 hours  azithromycin  IVPB      cefTRIAXone   IVPB 1 Gram(s) IV Intermittent every 24 hours    Cardiovascular:  Heart Failure  Acute   Acute on Chronic  Chronic       hydrALAZINE Injectable 10 milliGRAM(s) IV Push every 8 hours    Pulmonary:    Hematalogic:  heparin  Injectable 5000 Unit(s) SubCutaneous every 8 hours    Other:  acetaminophen  Suppository 650 milliGRAM(s) Rectal every 6 hours PRN  aspirin Suppository 300 milliGRAM(s) Rectal daily  atorvastatin 40 milliGRAM(s) Oral at bedtime  chlorhexidine 4% Liquid 1 Application(s) Topical <User Schedule>  dextrose 5% + sodium chloride 0.9%. 1000 milliLiter(s) IV Continuous <Continuous>  levothyroxine Injectable 50 MICROGram(s) IV Push at bedtime  LORazepam   Injectable 1 milliGRAM(s) IV Push every 4 hours PRN    acetaminophen  Suppository 650 milliGRAM(s) Rectal every 6 hours PRN  aspirin Suppository 300 milliGRAM(s) Rectal daily  atorvastatin 40 milliGRAM(s) Oral at bedtime  azithromycin  IVPB 500 milliGRAM(s) IV Intermittent every 24 hours  azithromycin  IVPB      cefTRIAXone   IVPB 1 Gram(s) IV Intermittent every 24 hours  chlorhexidine 4% Liquid 1 Application(s) Topical <User Schedule>  dextrose 5% + sodium chloride 0.9%. 1000 milliLiter(s) IV Continuous <Continuous>  heparin  Injectable 5000 Unit(s) SubCutaneous every 8 hours  hydrALAZINE Injectable 10 milliGRAM(s) IV Push every 8 hours  levothyroxine Injectable 50 MICROGram(s) IV Push at bedtime  LORazepam   Injectable 1 milliGRAM(s) IV Push every 4 hours PRN    Drug Dosing Weight  Height (cm): 167.64 (2018 07:19)  Weight (kg): 72.6 (2018 11:15)  BMI (kg/m2): 25.8 (2018 11:15)  BSA (m2): 1.82 (2018 11:15)    CENTRAL LINE: [ ] YES [ ] NO  LOCATION:   DATE INSERTED:  REMOVE: [ ] YES [ ] NO  EXPLAIN:    CH: [ ] YES [ ] NO    DATE INSERTED:  REMOVE:  [ ] YES [ ] NO  EXPLAIN:    A-LINE:  [ ] YES [ ] NO  LOCATION:   DATE INSERTED:  REMOVE:  [ ] YES [ ] NO  EXPLAIN:    PAST MEDICAL & SURGICAL HISTORY:  Neurogenic bladder  Hypothyroidism  BPH (benign prostatic hyperplasia)  Hypothyroid               @ 07:01  -   @ 07:00  --------------------------------------------------------  IN: 810 mL / OUT: 27 mL / NET: 783 mL            PHYSICAL EXAM:    GENERAL: NAD, well-groomed, well-developed  HEAD:  Atraumatic, Normocephalic  EYES: EOMI, PERRLA, conjunctiva and sclera clear  ENMT: No tonsillar erythema, exudates, or enlargement; Moist mucous membranes, Good dentition, No lesions  NECK: Supple, No JVD, Normal thyroid  NERVOUS SYSTEM:  Alert & Oriented X3, Good concentration; Motor Strength 5/5 B/L upper and lower extremities; DTRs 2+ intact and symmetric  CHEST/LUNG: Clear to percussion bilaterally; No rales, rhonchi, wheezing, or rubs  HEART: Regular rate and rhythm; No murmurs, rubs, or gallops  ABDOMEN: Soft, Nontender, Nondistended; Bowel sounds present  EXTREMITIES:  2+ Peripheral Pulses, No clubbing, cyanosis, or edema  LYMPH: No lymphadenopathy noted  SKIN: No rashes or lesions      LABS:  CBC Full  -  ( 2018 06:37 )  WBC Count : 10.1 K/uL  Hemoglobin : 8.6 g/dL  Hematocrit : 27.9 %  Platelet Count - Automated : 135 K/uL  Mean Cell Volume : 108.9 fl  Mean Cell Hemoglobin : 33.5 pg  Mean Cell Hemoglobin Concentration : 30.8 gm/dL  Auto Neutrophil # : x  Auto Lymphocyte # : x  Auto Monocyte # : x  Auto Eosinophil # : x  Auto Basophil # : x  Auto Neutrophil % : x  Auto Lymphocyte % : x  Auto Monocyte % : x  Auto Eosinophil % : x  Auto Basophil % : x        138  |  106  |  29<H>  ----------------------------<  107<H>  3.6   |  26  |  0.81    Ca    8.2<L>      2018 06:37  Phos  2.4     06-14  Mg     2.0     06-14    TPro  6.8  /  Alb  2.3<L>  /  TBili  0.9  /  DBili  x   /  AST  38  /  ALT  15  /  AlkPhos  67  06-13      Urinalysis Basic - ( 2018 16:51 )    Color: Yellow / Appearance: Clear / S.015 / pH: x  Gluc: x / Ketone: Negative  / Bili: Negative / Urobili: Negative   Blood: x / Protein: 15 / Nitrite: Negative   Leuk Esterase: Negative / RBC: 10-25 /HPF / WBC 3-5 /HPF   Sq Epi: x / Non Sq Epi: Occasional /HPF / Bacteria: Trace /HPF            [  ]  DVT Prophylaxis  [  ]  Nutrition, Brand, Rate         Goal Rate         Abdominal Nutritional Status -  Malnutrition   Cachexia      Morbid Obesity BMI >/=40    RADIOLOGY & ADDITIONAL STUDIES:  ***    [  ] Goals of Care Discussion with Family/Proxy/Other           Elements of Conversation Discussed: Patient/Family understanding of current illness   Advanced Directives                                                                       Prognosis  Treatment Options  Care Aligned with patient's wishes                                             TIME SPENT: 35 minutes DNR [ x]   DNI  [ x ]    INTERVAL HPI/OVERNIGHT EVENTS: Downgraded to SCU overnight, no acute events    PRESSORS: [ ] YES [x ] NO  WHICH:      azithromycin  IVPB 500 milliGRAM(s) IV Intermittent every 24 hours  day 3  azithromycin  IVPB      cefTRIAXone   IVPB 1 Gram(s) IV Intermittent every 24 hours    Cardiovascular: Normal Left Ventricular Systolic Function, (EF = 55 to 60%) Normal left ventricular internal dimensions and wall thicknesses. Grade II diastolic dysfunction.    hydrALAZINE Injectable 10 milliGRAM(s) IV Push every 8 hours    Hematalogic:  heparin  Injectable 5000 Unit(s) SubCutaneous every 8 hours    Other:  acetaminophen  Suppository 650 milliGRAM(s) Rectal every 6 hours PRN  aspirin Suppository 300 milliGRAM(s) Rectal daily  atorvastatin 40 milliGRAM(s) Oral at bedtime  chlorhexidine 4% Liquid 1 Application(s) Topical <User Schedule>  dextrose 5% + sodium chloride 0.9%. 1000 milliLiter(s) IV Continuous <Continuous>  levothyroxine Injectable 50 MICROGram(s) IV Push at bedtime  LORazepam   Injectable 1 milliGRAM(s) IV Push every 4 hours PRN    acetaminophen  Suppository 650 milliGRAM(s) Rectal every 6 hours PRN  aspirin Suppository 300 milliGRAM(s) Rectal daily  atorvastatin 40 milliGRAM(s) Oral at bedtime  azithromycin  IVPB 500 milliGRAM(s) IV Intermittent every 24 hours  azithromycin  IVPB      cefTRIAXone   IVPB 1 Gram(s) IV Intermittent every 24 hours  chlorhexidine 4% Liquid 1 Application(s) Topical <User Schedule>  dextrose 5% + sodium chloride 0.9%. 1000 milliLiter(s) IV Continuous <Continuous>  heparin  Injectable 5000 Unit(s) SubCutaneous every 8 hours  hydrALAZINE Injectable 10 milliGRAM(s) IV Push every 8 hours  levothyroxine Injectable 50 MICROGram(s) IV Push at bedtime  LORazepam   Injectable 1 milliGRAM(s) IV Push every 4 hours PRN    Drug Dosing Weight  Height (cm): 167.64 (2018 07:19)  Weight (kg): 72.6 (2018 11:15)  BMI (kg/m2): 25.8 (2018 11:15)  BSA (m2): 1.82 (2018 11:15)    CENTRAL LINE: [ ] YES [x ] NO  LOCATION:   DATE INSERTED:  REMOVE: [ ] YES [ ] NO  EXPLAIN:    CH: [ ] YES [ ] NO    DATE INSERTED:  REMOVE:  [ ] YES [ ] NO  EXPLAIN:    A-LINE:  [ ] YES [x ] NO  LOCATION:   DATE INSERTED:  REMOVE:  [ ] YES [ ] NO  EXPLAIN:    PAST MEDICAL & SURGICAL HISTORY:  Neurogenic bladder  Hypothyroidism  BPH (benign prostatic hyperplasia)  Hypothyroid         @ 07:01  -   @ 07:00  --------------------------------------------------------  IN: 810 mL / OUT: 27 mL / NET: 783 mL        PHYSICAL EXAM:    GENERAL: sleeping in bed in NAD,   HEAD:  Atraumatic, Normocephalic  EYES: EOMI, PERRLA, conjunctiva and sclera clear  ENMT: No tonsillar erythema, exudates, or enlargement; Moist mucous membranes,   NECK: Supple, No JVD, Normal thyroid  NERVOUS SYSTEM:  periods of agitation   CHEST/LUNG: Clear to percussion bilaterally; No rales, rhonchi, wheezing, or rubs  HEART: Regular rate and rhythm; No murmurs, rubs, or gallops  ABDOMEN: Soft, Nontender, Nondistended; Bowel sounds present  EXTREMITIES:  2+ Peripheral Pulses, No clubbing, cyanosis, or edema  LYMPH: No lymphadenopathy noted  SKIN: No rashes or lesions      LABS:  CBC Full  -  ( 2018 06:37 )  WBC Count : 10.1 K/uL  Hemoglobin : 8.6 g/dL  Hematocrit : 27.9 %  Platelet Count - Automated : 135 K/uL  Mean Cell Volume : 108.9 fl  Mean Cell Hemoglobin : 33.5 pg  Mean Cell Hemoglobin Concentration : 30.8 gm/dL  Auto Neutrophil # : x  Auto Lymphocyte # : x  Auto Monocyte # : x  Auto Eosinophil # : x  Auto Basophil # : x  Auto Neutrophil % : x  Auto Lymphocyte % : x  Auto Monocyte % : x  Auto Eosinophil % : x  Auto Basophil % : x        138  |  106  |  29<H>  ----------------------------<  107<H>  3.6   |  26  |  0.81    Ca    8.2<L>      2018 06:37  Phos  2.4     06-  Mg     2.0     06-14    TPro  6.8  /  Alb  2.3<L>  /  TBili  0.9  /  DBili  x   /  AST  38  /  ALT  15  /  AlkPhos  67  06-13      Urinalysis Basic - ( 2018 16:51 )    Color: Yellow / Appearance: Clear / S.015 / pH: x  Gluc: x / Ketone: Negative  / Bili: Negative / Urobili: Negative   Blood: x / Protein: 15 / Nitrite: Negative   Leuk Esterase: Negative / RBC: 10-25 /HPF / WBC 3-5 /HPF   Sq Epi: x / Non Sq Epi: Occasional /HPF / Bacteria: Trace /HPF            [ x ]  DVT Prophylaxis - subc heparin  [x  ]  Nutrition, Brand, Rate - dysphagia nectar         Goal Rate         Abdominal Nutritional Status -  Malnutrition   Cachexia      Morbid Obesity BMI >/=40    RADIOLOGY & ADDITIONAL STUDIES:  ***    [  ] Goals of Care Discussion with Family/Proxy/Other           Elements of Conversation Discussed: Patient/Family understanding of current illness   Advanced Directives                                                                       Prognosis  Treatment Options  Care Aligned with patient's wishes                                             TIME SPENT: 35 minutes DNR [ x]   DNI  [ x ]    INTERVAL HPI/OVERNIGHT EVENTS: Downgraded to SCU overnight, spiked temp 101.3 overnight    PRESSORS: [ ] YES [x ] NO  WHICH:      azithromycin  IVPB 500 milliGRAM(s) IV Intermittent every 24 hours  day 3  azithromycin  IVPB      cefTRIAXone   IVPB 1 Gram(s) IV Intermittent every 24 hours    Cardiovascular: Normal Left Ventricular Systolic Function, (EF = 55 to 60%) Normal left ventricular internal dimensions and wall thicknesses. Grade II diastolic dysfunction.    hydrALAZINE Injectable 10 milliGRAM(s) IV Push every 8 hours    Hematalogic:  heparin  Injectable 5000 Unit(s) SubCutaneous every 8 hours    Other:  acetaminophen  Suppository 650 milliGRAM(s) Rectal every 6 hours PRN  aspirin Suppository 300 milliGRAM(s) Rectal daily  atorvastatin 40 milliGRAM(s) Oral at bedtime  chlorhexidine 4% Liquid 1 Application(s) Topical <User Schedule>  dextrose 5% + sodium chloride 0.9%. 1000 milliLiter(s) IV Continuous <Continuous>  levothyroxine Injectable 50 MICROGram(s) IV Push at bedtime  LORazepam   Injectable 1 milliGRAM(s) IV Push every 4 hours PRN    acetaminophen  Suppository 650 milliGRAM(s) Rectal every 6 hours PRN  aspirin Suppository 300 milliGRAM(s) Rectal daily  atorvastatin 40 milliGRAM(s) Oral at bedtime  azithromycin  IVPB 500 milliGRAM(s) IV Intermittent every 24 hours  azithromycin  IVPB      cefTRIAXone   IVPB 1 Gram(s) IV Intermittent every 24 hours  chlorhexidine 4% Liquid 1 Application(s) Topical <User Schedule>  dextrose 5% + sodium chloride 0.9%. 1000 milliLiter(s) IV Continuous <Continuous>  heparin  Injectable 5000 Unit(s) SubCutaneous every 8 hours  hydrALAZINE Injectable 10 milliGRAM(s) IV Push every 8 hours  levothyroxine Injectable 50 MICROGram(s) IV Push at bedtime  LORazepam   Injectable 1 milliGRAM(s) IV Push every 4 hours PRN    Drug Dosing Weight  Height (cm): 167.64 (2018 07:19)  Weight (kg): 72.6 (2018 11:15)  BMI (kg/m2): 25.8 (2018 11:15)  BSA (m2): 1.82 (2018 11:15)    CENTRAL LINE: [ ] YES [x ] NO  LOCATION:   DATE INSERTED:  REMOVE: [ ] YES [ ] NO  EXPLAIN:    CH: [ ] YES [ x] NO    DATE INSERTED:  REMOVE:  [ ] YES [ ] NO  EXPLAIN:    A-LINE:  [ ] YES [x ] NO  LOCATION:   DATE INSERTED:  REMOVE:  [ ] YES [ ] NO  EXPLAIN:    PAST MEDICAL & SURGICAL HISTORY:  Neurogenic bladder  Hypothyroidism  BPH (benign prostatic hyperplasia)  Hypothyroid         @ 07:01  -   @ 07:00  --------------------------------------------------------  IN: 810 mL / OUT: 27 mL / NET: 783 mL        PHYSICAL EXAM:    GENERAL: sleeping in bed in NAD,   HEAD:  Atraumatic, Normocephalic  EYES: EOMI, PERRLA, conjunctiva and sclera clear  ENMT: No tonsillar erythema, exudates, or enlargement; Moist mucous membranes,   NECK: Supple, No JVD, Normal thyroid  NERVOUS SYSTEM:  periods of agitation   CHEST/LUNG: Clear to percussion bilaterally; No rales, rhonchi, wheezing, or rubs  HEART: Regular rate and rhythm; No murmurs, rubs, or gallops  ABDOMEN: Soft, Nontender, Nondistended; Bowel sounds present  EXTREMITIES:  2+ Peripheral Pulses, No clubbing, cyanosis, or edema  LYMPH: No lymphadenopathy noted  SKIN: No rashes or lesions      LABS:  CBC Full  -  ( 2018 06:37 )  WBC Count : 10.1 K/uL  Hemoglobin : 8.6 g/dL  Hematocrit : 27.9 %  Platelet Count - Automated : 135 K/uL  Mean Cell Volume : 108.9 fl  Mean Cell Hemoglobin : 33.5 pg  Mean Cell Hemoglobin Concentration : 30.8 gm/dL  Auto Neutrophil # : x  Auto Lymphocyte # : x  Auto Monocyte # : x  Auto Eosinophil # : x  Auto Basophil # : x  Auto Neutrophil % : x  Auto Lymphocyte % : x  Auto Monocyte % : x  Auto Eosinophil % : x  Auto Basophil % : x        138  |  106  |  29<H>  ----------------------------<  107<H>  3.6   |  26  |  0.81    Ca    8.2<L>      2018 06:37  Phos  2.4     06-14  Mg     2.0     06-14    TPro  6.8  /  Alb  2.3<L>  /  TBili  0.9  /  DBili  x   /  AST  38  /  ALT  15  /  AlkPhos  67  06-13      Urinalysis Basic - ( 2018 16:51 )    Color: Yellow / Appearance: Clear / S.015 / pH: x  Gluc: x / Ketone: Negative  / Bili: Negative / Urobili: Negative   Blood: x / Protein: 15 / Nitrite: Negative   Leuk Esterase: Negative / RBC: 10-25 /HPF / WBC 3-5 /HPF   Sq Epi: x / Non Sq Epi: Occasional /HPF / Bacteria: Trace /HPF            [ x ]  DVT Prophylaxis - subc heparin  [x  ]  Nutrition, Brand, Rate - dysphagia nectar         Goal Rate         Abdominal Nutritional Status -  Malnutrition   Cachexia      Morbid Obesity BMI >/=40    RADIOLOGY & ADDITIONAL STUDIES:  ***    [  ] Goals of Care Discussion with Family/Proxy/Other           Elements of Conversation Discussed: Patient/Family understanding of current illness   Advanced Directives                                                                       Prognosis  Treatment Options  Care Aligned with patient's wishes                                             TIME SPENT: 35 minutes

## 2018-06-14 NOTE — PROGRESS NOTE ADULT - PROBLEM SELECTOR PLAN 2
-UA and culture neg  blood cultures neg to date  spiked temp overnight  -cxr: Mild pulmonary vascular congestion. Small right basilar nodular opacity; suction prn  -c/w rocephin and azithromycin

## 2018-06-14 NOTE — SWALLOW BEDSIDE ASSESSMENT ADULT - SLP PERTINENT HISTORY OF CURRENT PROBLEM
89 y/o M from home with HHA 12 hours, ambulates with walker. PMHx of BPH, arthiritis hypothyroidism, thoracic aneurysm. Pt admitted on 6/12/18 with c/o weakness, aphasia, poor resposiveness and inability to get up from toilet seat. ROS positive for increased urinary frequency and burning for past couple of days. CXR indicated mild pulmonary vascular congestion and small right basilar nodular opacity. CT Head indicated moderate severe chronic small vessel ischemic changes in the frontal parietal white matter and small chronic appearing right frontal cortical infarct.

## 2018-06-14 NOTE — SWALLOW BEDSIDE ASSESSMENT ADULT - PHARYNGEAL PHASE
Delayed pharyngeal swallow Throat clear post oral intake/Delayed pharyngeal swallow/Wet vocal quality post oral intake/Delayed cough post oral intake

## 2018-06-14 NOTE — PROGRESS NOTE ADULT - PROBLEM SELECTOR PLAN 1
cva  -presented with NIH score of 15  -right hemiparesis and aphasia  -hydralazine prn for bp >185/105  initiate statin  -Carotid doppler neg for sig stenosis  -Neuro: Dr. Pineda cva  -presented with NIH score of 15  -right hemiparesis and aphasia  -hydralazine prn for bp >185/105  initiate statin  -Carotid doppler neg for sig stenosis  TTE showing grade 2 DD with normal left ventricular internal dimensions and wall thicknesses; EF 55-60%  -Neuro: Dr. Pineda cva  -presented with NIH score of 15  -right hemiparesis and aphasia  -hydralazine prn for bp >185/105  initiate statin  -Carotid doppler neg for sig stenosis  TTE showing grade 2 DD with normal left ventricular internal dimensions and wall thicknesses; EF 55-60%  -Neuro: Dr. Pineda  s/w Dr. Andrews - will get MRI (non-contrast) of head to r/o CVA

## 2018-06-14 NOTE — SWALLOW BEDSIDE ASSESSMENT ADULT - SWALLOW EVAL: DIAGNOSIS
Pt p/w orophyaryngeal dysphagia c/b anterior loss of bolus, reduced oral grading, decreased AP movement of the bolus, delayed oral transit, & delayed pharyngeal swallow trigger of all consistencies tested. Overt s&s of penetration/aspiration noted on thin liquids.

## 2018-06-14 NOTE — PROGRESS NOTE ADULT - PROBLEM SELECTOR PLAN 1
cva  presented with NIH score of 15  right hemiparesis and aphasia  s/p tele stroke and TPA given  neuro [facility] q2 hour  neurology evaluation appreciated. repeat CT as per neuro  start on statin when able to tolerate oral . s/p TPA  hydralazine prn for bp >185/105  ct head: Moderate severe chronic small vessel ischemic changes in the frontal   parietal white matter and small chronic appearing right frontal cortical   infarct.  f/u carotid doppler and ECHO  ekg NSR FIRST Degree block left axis deviation  npo f/u s/s eval

## 2018-06-14 NOTE — PROGRESS NOTE ADULT - PROBLEM SELECTOR PLAN 2
sepsis. Patient spiked f fever last evening. cultures are negative so far.  cxr: Mild pulmonary vascular congestion. Small right basilar nodular opacity;   consider follow. Overall prognosis is very poor despite any medical intervention. family fully aware.    UTI   continue antibiotics, zithro and Rocephin

## 2018-06-14 NOTE — PROGRESS NOTE ADULT - ASSESSMENT
87 yo male from home HHA 12 hours ambulates with walker pmh of BPH, arthritis hypothyroidism, thoracic aneurysm  presented to the hospital with complaints of weakness, aphasia , poor responsiveness and unable to get up from toilet seat.  Admitted to ICU for concern of CVA s/p TPA monitoring and sepsis likely secondary to pna vs UTI. Patient now in the SCU

## 2018-06-14 NOTE — PROGRESS NOTE ADULT - ASSESSMENT
89 yo male from home HHA 12 hours ambulates with walker pmh of BPH, arthiritis hypothyroidism, thoracic aneurysm  presented to the hospital with complaints of weakness, aphasia , poor resposiveness and unable to get up from toilet seat.  is being admitted to ICU for concern of CVA s/p TPA monitoring and for sepsis likely secondary to pna vs UTI.

## 2018-06-14 NOTE — SWALLOW BEDSIDE ASSESSMENT ADULT - COMMENTS
Pt HOB elevated to 90 degrees. Wife and daughter present at bedside. A,A+Ox2 (name, location), inconsistently followed directives following clinician model, responded to clinician questions but p/w moderately impaired speech intelligibility. Pt required suctioning prior to PO trials to manage secretions. Tremors of the upper extremities observed.

## 2018-06-15 LAB
ALBUMIN SERPL ELPH-MCNC: 2.4 G/DL — LOW (ref 3.5–5)
ALP SERPL-CCNC: 72 U/L — SIGNIFICANT CHANGE UP (ref 40–120)
ALT FLD-CCNC: 13 U/L DA — SIGNIFICANT CHANGE UP (ref 10–60)
ANION GAP SERPL CALC-SCNC: 7 MMOL/L — SIGNIFICANT CHANGE UP (ref 5–17)
AST SERPL-CCNC: 13 U/L — SIGNIFICANT CHANGE UP (ref 10–40)
BILIRUB SERPL-MCNC: 0.7 MG/DL — SIGNIFICANT CHANGE UP (ref 0.2–1.2)
BUN SERPL-MCNC: 35 MG/DL — HIGH (ref 7–18)
CALCIUM SERPL-MCNC: 8.2 MG/DL — LOW (ref 8.4–10.5)
CHLORIDE SERPL-SCNC: 108 MMOL/L — SIGNIFICANT CHANGE UP (ref 96–108)
CO2 SERPL-SCNC: 26 MMOL/L — SIGNIFICANT CHANGE UP (ref 22–31)
CREAT SERPL-MCNC: 0.85 MG/DL — SIGNIFICANT CHANGE UP (ref 0.5–1.3)
GLUCOSE SERPL-MCNC: 96 MG/DL — SIGNIFICANT CHANGE UP (ref 70–99)
HCT VFR BLD CALC: 27 % — LOW (ref 39–50)
HGB BLD-MCNC: 8.5 G/DL — LOW (ref 13–17)
MCHC RBC-ENTMCNC: 31.5 GM/DL — LOW (ref 32–36)
MCHC RBC-ENTMCNC: 34.6 PG — HIGH (ref 27–34)
MCV RBC AUTO: 109.9 FL — HIGH (ref 80–100)
PLATELET # BLD AUTO: 150 K/UL — SIGNIFICANT CHANGE UP (ref 150–400)
POTASSIUM SERPL-MCNC: 3.6 MMOL/L — SIGNIFICANT CHANGE UP (ref 3.5–5.3)
POTASSIUM SERPL-SCNC: 3.6 MMOL/L — SIGNIFICANT CHANGE UP (ref 3.5–5.3)
PROT SERPL-MCNC: 6.3 G/DL — SIGNIFICANT CHANGE UP (ref 6–8.3)
RAPID RVP RESULT: SIGNIFICANT CHANGE UP
RBC # BLD: 2.46 M/UL — LOW (ref 4.2–5.8)
RBC # FLD: 19.3 % — HIGH (ref 10.3–14.5)
SODIUM SERPL-SCNC: 141 MMOL/L — SIGNIFICANT CHANGE UP (ref 135–145)
WBC # BLD: 10.1 K/UL — SIGNIFICANT CHANGE UP (ref 3.8–10.5)
WBC # FLD AUTO: 10.1 K/UL — SIGNIFICANT CHANGE UP (ref 3.8–10.5)

## 2018-06-15 PROCEDURE — 71045 X-RAY EXAM CHEST 1 VIEW: CPT | Mod: 26

## 2018-06-15 RX ORDER — PIPERACILLIN AND TAZOBACTAM 4; .5 G/20ML; G/20ML
3.38 INJECTION, POWDER, LYOPHILIZED, FOR SOLUTION INTRAVENOUS EVERY 8 HOURS
Qty: 0 | Refills: 0 | Status: DISCONTINUED | OUTPATIENT
Start: 2018-06-15 | End: 2018-06-19

## 2018-06-15 RX ADMIN — Medication 50 MICROGRAM(S): at 21:30

## 2018-06-15 RX ADMIN — HEPARIN SODIUM 5000 UNIT(S): 5000 INJECTION INTRAVENOUS; SUBCUTANEOUS at 06:11

## 2018-06-15 RX ADMIN — HEPARIN SODIUM 5000 UNIT(S): 5000 INJECTION INTRAVENOUS; SUBCUTANEOUS at 13:48

## 2018-06-15 RX ADMIN — Medication 300 MILLIGRAM(S): at 12:36

## 2018-06-15 RX ADMIN — ATORVASTATIN CALCIUM 40 MILLIGRAM(S): 80 TABLET, FILM COATED ORAL at 21:30

## 2018-06-15 RX ADMIN — HEPARIN SODIUM 5000 UNIT(S): 5000 INJECTION INTRAVENOUS; SUBCUTANEOUS at 21:30

## 2018-06-15 RX ADMIN — CEFTRIAXONE 100 GRAM(S): 500 INJECTION, POWDER, FOR SOLUTION INTRAMUSCULAR; INTRAVENOUS at 13:48

## 2018-06-15 RX ADMIN — AZITHROMYCIN 250 MILLIGRAM(S): 500 TABLET, FILM COATED ORAL at 09:54

## 2018-06-15 RX ADMIN — PIPERACILLIN AND TAZOBACTAM 25 GRAM(S): 4; .5 INJECTION, POWDER, LYOPHILIZED, FOR SOLUTION INTRAVENOUS at 21:30

## 2018-06-15 NOTE — PROGRESS NOTE ADULT - PROBLEM SELECTOR PLAN 2
-UA and culture neg  blood cultures neg to date  spiked temp overnight  -cxr: Mild pulmonary vascular congestion. Small right basilar nodular opacity; suction prn  -c/w rocephin and azithromycin day 4  ID evaluation is called for fever  f/u repeat blood culture

## 2018-06-15 NOTE — PROGRESS NOTE ADULT - ATTENDING COMMENTS
VASCULAR NEUROLOGY ATTENDING  The patient is seen and examined the history and imaging are reviewed. MRI with possible R subcortical infarct. Given medical comorbidities would continue ASA, statin when able. No further inpatient neurologic work-up required. VASCULAR NEUROLOGY ATTENDING  The patient is seen and examined the history and imaging are reviewed. MRI with possible R subcortical infarct. Continue ASA, statin when able. No further inpatient neurologic work-up required.

## 2018-06-15 NOTE — PROGRESS NOTE ADULT - SUBJECTIVE AND OBJECTIVE BOX
VASCULAR NEUROLOGY ATTENDING NOTE    Patient seen and examined history and imaging reviewed.     Overnight Events: MRI performed.    VITALS:  Vital Signs Last 24 Hrs  T(C): 36.8 (15 Colton 2018 05:11), Max: 38.3 (14 Jun 2018 19:48)  T(F): 98.2 (15 Colton 2018 05:11), Max: 101 (14 Jun 2018 19:48)  HR: 81 (15 Colton 2018 06:55) (46 - 87)  BP: 143/42 (15 Colton 2018 05:11) (118/42 - 143/42)  BP(mean): --  RR: 19 (15 Colton 2018 05:11) (18 - 20)  SpO2: 96% (15 Colton 2018 05:11) (96% - 98%)    Labs:   06-15    141  |  108  |  35<H>  ----------------------------<  96  3.6   |  26  |  0.85    Ca    8.2<L>      15 Colton 2018 06:25  Phos  2.4     06-14  Mg     2.0     06-14    TPro  6.3  /  Alb  2.4<L>  /  TBili  0.7  /  DBili  x   /  AST  13  /  ALT  13  /  AlkPhos  72  06-15                          8.5    10.1  )-----------( 150      ( 15 Colton 2018 06:25 )             27.0       MEDS:  aspirin Suppository 300 milliGRAM(s) Rectal daily  atorvastatin 40 milliGRAM(s) Oral at bedtime  azithromycin  IVPB 500 milliGRAM(s) IV Intermittent every 24 hours  azithromycin  IVPB      cefTRIAXone   IVPB 1 Gram(s) IV Intermittent every 24 hours  heparin  Injectable 5000 Unit(s) SubCutaneous every 8 hours  hydrALAZINE Injectable 10 milliGRAM(s) IV Push every 8 hours  levothyroxine Injectable 50 MICROGram(s) IV Push at bedtime  LORazepam   Injectable 0.5 milliGRAM(s) IV Push once      Exam:   MS: Jackelyn awake oriented to name alone. Unable to provide location.  CNs: EOMI, VFF, face symmetric, tongue midline, severe dysarthria,  Motor:  Antigravity throughout  Sensory:  grossly intact sensation throughout  Coord:  no overt dysmetria,   Gait: not tested, on bedrest

## 2018-06-15 NOTE — PROGRESS NOTE ADULT - PROBLEM SELECTOR PLAN 1
cva  presented with NIH score of 15  right hemiparesis and aphasia  s/p tele stroke and TPA given  neurology evaluation appreciated. MRI of head report done 6/14/2018 is noted. neurology note and follow up noted.  start on statin when able to tolerate oral . s/p TPA  hydralazine prn for bp >185/105  ct head: Moderate severe chronic small vessel ischemic changes in the frontal   parietal white matter and small chronic appearing right frontal cortical   infarct.  f/u carotid doppler and ECHO  ekg NSR FIRST Degree block left axis deviation  npo f/u s/s eval

## 2018-06-15 NOTE — PROGRESS NOTE ADULT - PROBLEM SELECTOR PLAN 2
sepsis. Patient spiked f fever last evening. cultures are negative so far.  cxr: Mild pulmonary vascular congestion. Small right basilar nodular opacity;   consider follow. Overall prognosis is very poor despite any medical intervention. family fully aware.    UTI   continue antibiotics, zithro and Rocephin., Patient also with drop in H/H from 10.5 to 8.5. rule out dilutional. will repeat cbc in am if any further drop will rule out possible blood loss and will investigate. Spoke with daughter today. she does not want any invasive measures and understands that the prognosis is poor despite any medical intervention.

## 2018-06-15 NOTE — CONSULT NOTE ADULT - SUBJECTIVE AND OBJECTIVE BOX
HPI:  89 yo male from home HHA 12 hours ambulates with walker pmh of BPH, arthiritis hypothyroidism, thoracic aneurysm  presented to the hospital with complaints of weakness, aphasia , poor resposiveness and unable to get up from toilet seat. history obtained by family at bedside. According to his wife, he had been "shaking" for most of the night. On 6/12/18 he awoke and was able to walk to the bathroom with his walker as usual. He sat on the toilet, and then had difficulty getting off the toiletThere was diminished verbal output. wife said he used his left arm to try to get up but she felt his right side was weak. his eyes were closed and he was not responsive. she called EMS. ROS positive for increased urianry frequency and buring for past couple of days. no chest pain , SOB cough.    in the ER Stroke code called on arrival at 7:15a, tele-stroke activated at 7:20a. VS STABEL EXCEPT tmax 102.4. ON TELE stroke patient had NIH scale of 15. b/l weakness greater on right side. code sepsis was also initiaated. labs pertinent for wbc count of q12 , ekg nsr left axis. lactate of 2.4. cxr : Mild pulmonary vascular congestion. Small right basilar nodular opacity. ct head: Moderate severe chronic small vessel ischemic changes in the frontal parietal white matter and small chronic appearing right frontal cortical infarct. TPA given at 9 am , and icu consult called.  code sepsis was also initiaated. labs pertinent for wbc count of q12 , ekg nsr left axis. lactate of 2.4. cxr : Mild pulmonary vascular congestion. Small right basilar nodular opacity. started on rocephin and zmax . ID called for eval of fevers . still coughing as per family with inability to bring up phlegm . also difficulty in passing urine       PAST MEDICAL & SURGICAL HISTORY:  Neurogenic bladder  Hypothyroidism  BPH (benign prostatic hyperplasia)  Hypothyroid      No Known Allergies    meds   acetaminophen  Suppository 650 milliGRAM(s) Rectal every 6 hours PRN  aspirin Suppository 300 milliGRAM(s) Rectal daily  atorvastatin 40 milliGRAM(s) Oral at bedtime  azithromycin  IVPB 500 milliGRAM(s) IV Intermittent every 24 hours  azithromycin  IVPB      cefTRIAXone   IVPB 1 Gram(s) IV Intermittent every 24 hours  heparin  Injectable 5000 Unit(s) SubCutaneous every 8 hours  hydrALAZINE Injectable 10 milliGRAM(s) IV Push every 8 hours  levothyroxine Injectable 50 MICROGram(s) IV Push at bedtime  LORazepam   Injectable 0.5 milliGRAM(s) IV Push once      Social Hx: ex smoker more than 30 yrs ago     FAMILY HISTORY: non contributory         ROS  [  ] UNABLE TO ELICIT    General:  [  x] Fever   [ x ] Malaise    Skin: no rash     HEENT:  [  -] Sore Throat  [ - ] Photophobia	    Chest: [ x ] SOB  [x  ] Cough with no expectoration     Cardiovascular: [ - ] CP	has orthopnea     Gastrointestinal: [-  ] Abd pain   [ - ] N    [ - ] V   [ - ] Diarrhea	    Genitourinary: [ x ] Polyuria   [ x ] Urgency   [-  ] Dysuria    [-  ]  Hematuria	    Musculoskeletal:  [ - ] Back Pain	    Neurological: [ - ]Dizziness  [  -]Visual Disturbance  [-  ]Headaches      PHYSICAL EXAM:    Vital Signs Last 24 Hrs  T(C): 36.8 (15 Colton 2018 13:53), Max: 38.3 (14 Jun 2018 19:48)  T(F): 98.3 (15 Colton 2018 13:53), Max: 101 (14 Jun 2018 19:48)  HR: 88 (15 Colton 2018 13:53) (46 - 88)  BP: 126/89 (15 Colton 2018 13:53) (118/42 - 143/42)  BP(mean): 96 (15 Colton 2018 13:53) (96 - 96)  RR: 17 (15 Colton 2018 13:53) (17 - 20)  SpO2: 94% (15 Colton 2018 13:53) (94% - 97%)    Constitutional: awake confused nad    COURTNEY SCLERA anicteric EOMI   LUNGS bibasilar few rales   CVS s1 s2 aud no murmurs   ABDOMEN vol guarding , non tender ?bladder distention   NEUROLOGY  no defecits   SKIN no rash   EXTREMITIES no edema no cyanosis         LABS/DIAGNOSTIC TESTS                          8.5    10.1  )-----------( 150      ( 15 Colton 2018 06:25 )             27.0     WBC Count: 10.1 K/uL (06-15 @ 06:25)  WBC Count: 10.1 K/uL (06-14 @ 06:37)  WBC Count: 12.2 K/uL (06-13 @ 12:14)      06-15    141  |  108  |  35<H>  ----------------------------<  96  3.6   |  26  |  0.85    Ca    8.2<L>      15 Colton 2018 06:25  Phos  2.4     06-14  Mg     2.0     06-14    TPro  6.3  /  Alb  2.4<L>  /  TBili  0.7  /  DBili  x   /  AST  13  /  ALT  13  /  AlkPhos  72  06-15          LIVER FUNCTIONS - ( 15 Colton 2018 06:25 )  Alb: 2.4 g/dL / Pro: 6.3 g/dL / ALK PHOS: 72 U/L / ALT: 13 U/L DA / AST: 13 U/L / GGT: x                 LACTATE:Lactate, Blood (06.12.18 @ 10:07)    Lactate, Blood: 1.2 mmol/L          Culture Results: urine   No growth (06-12 @ 23:37)    Culture - Blood (06.12.18 @ 13:38)    Specimen Source: .Blood Blood    Culture Results:   No growth to date.    Culture - Blood (06.12.18 @ 13:38)    Specimen Source: .Blood Blood    Culture Results:   No growth to date.            RADIOLOGY  < from: MR Head No Cont (06.14.18 @ 19:17) >    EXAM:  MR BRAIN                            PROCEDURE DATE:  06/14/2018          INTERPRETATION:  .    CLINICAL INFORMATION: Aphasia. Cerebrovascular accident.    TECHNIQUE: Multiplanar multisequential MRI of the brain was acquired   without the administration of IV gadolinium.    COMPARISON: Prior head CT examination dated 6/13/2018.    FINDINGS: Multiple nonspecific foci of T2/FLAIR hyperintensity are noted   throughout the deep and periventricular white matter of the cerebral   hemispheres. There is no associated mass effect. There is no evidence of   acute ischemia on the diffusion-weighted images. A chronic lacunar   infarct is notable within the right posterior frontal corona radiata.    There is diffuse cerebral volume loss with prominence of the sulci,   fissures, and cisternal spaces which is normal for the patient's age.   Ventricular size and configuration is unremarkable. Flow-voids are noted   throughout the major intracranial vessels, on the T2 weighted images,   consistentwith their patency. The sellar region and posterior fossa   appear unremarkable.    There is scattered mucosal thickening throughout the paranasal sinuses. A   small amount of layering fluid is notable within the left side of the   nasopharynx. The mastoid air cells are clear. Calvarial signal is within   normal limits. There is evidence of left-sided cataract removal.     IMPRESSION: No acute intracranial hemorrhage or evidence of acute   ischemia.    Microvascular disease and chronic lacunar infarct within the right corona   radiata.                JUAN JOSÉ WHEELER M.D., ATTENDING RADIOLOGIST  This document has been electronically signed. Colton 15 2018  8:44AM    < end of copied text >      < from: CT Head No Cont (06.13.18 @ 13:39) >    EXAM:  CT BRAIN                            PROCEDURE DATE:  06/13/2018          INTERPRETATION:  CT HEAD WITHOUT CONTRAST    CLINICAL STATEMENT: f/u post tpa.    COMPARISON: 6/12/2018.    TECHNIQUE: Noncontrast axial CT head was obtained from the skull base to   vertex.    FINDINGS: Study is slightly degraded by motion artifact.  No acute intracranial hemorrhage, mass effect or midline shift.  No CT evidence of acute large territory vascular infarct.  The ventricles and cortical sulci are prominent reflecting parenchymal   volume loss.  Patchy and more confluent hypodensities in the periventricular and   subcortical white matter are nonspecific, but likely sequela of small   vessel ischemic disease.  Intracranial atherosclerotic calcifications are present.    Small old right frontal lobe infarct is reidentified.    Tiny mucus retention cyst right maxillary sinus. Mucosal thickening of   the left maxillary sinus and bilateral ethmoid air cells. The mastoid air   cells are well aerated.  No displaced calvarial fracture.    IMPRESSION: Motion degraded study.  No acute intracranial hemorrhage or mass effect. Further evaluation with   MRI may be performed as clinically indicated.    < end of copied text >      < from: US Duplex Carotid Arteries Complete, Bilateral (06.12.18 @ 15:27) >  EXAM:  US DPLX CAROTIDS COMPL BI                            PROCEDURE DATE:  06/12/2018          INTERPRETATION:  Carotid duplex Doppler ultrasound    Indication: cva    Comparison: None    Carotid duplex Doppler ultrasound is performed. Grayscale ultrasound   demonstrates atherosclerotic plaques in the bilateral common carotid   arteries, carotid bulbs and proximal internal carotid arteries. The flow   velocity measurements during peak systolic phase in centimeters per   second are as the following:    Right , ICA 89, ECA 72.  Left , ICA 88, .    The end diastolic velocity measurement for the right ICA is 17, and  for   the left ICA is 10.    The peak systolic ICA/CCA velocity ratio on the right is 0.7, and on the   left is 0.7.    Both vertebral arteries maintain normal antegrade flow direction.    Impression: No hemodynamically significant internal carotid artery   stenosis by velocity criteria.    < end of copied text >      < from: Xray Chest 1 View-PORTABLE IMMEDIATE (06.12.18 @ 09:07) >  EXAM:  XR CHEST PORTABLE IMMED 1V                            PROCEDURE DATE:  06/12/2018          INTERPRETATION:  PORTABLE CHEST X-RAY    HISTORY: Shortness of Breath.    COMPARISON: None.    FINDINGS:  Patient is rotated.    Mild pulmonary vascular congestion. Small right basilar nodular opacity.  No focal lung consolidation.  No pneumothorax or pleural effusion.   The cardiac silhouette is not accurately assessed by AP technique.    IMPRESSION:  Mild pulmonary vascular congestion. Small right basilar nodular opacity;   consider follow up PA/lateral chest x-rays.    < end of copied text >

## 2018-06-15 NOTE — PROGRESS NOTE ADULT - SUBJECTIVE AND OBJECTIVE BOX
CHIEF COMPLAINT:Patient is a 89y old  Male who presents with a chief complaint of weakness. aphasia (12 Jun 2018 10:18)    	  REVIEW OF SYSTEMS:  CONSTITUTIONAL: No fever, weight loss, or fatigue  EYES: No eye pain, visual disturbances, or discharge  ENMT:  No difficulty hearing, tinnitus, vertigo; No sinus or throat pain  NECK: No pain or stiffness  RESPIRATORY: No cough, wheezing, chills or hemoptysis; No Shortness of Breath  CARDIOVASCULAR: No chest pain, palpitations, passing out, dizziness, or leg swelling  GASTROINTESTINAL: No abdominal or epigastric pain. No nausea, vomiting, or hematemesis; No diarrhea or constipation. No melena or hematochezia.  GENITOURINARY: No dysuria, frequency, hematuria, or incontinence  NEUROLOGICAL: No headaches, memory loss, loss of strength, numbness, or tremors  SKIN: No itching, burning, rashes, or lesions   LYMPH Nodes: No enlarged glands  ENDOCRINE: No heat or cold intolerance; No hair loss  MUSCULOSKELETAL: No joint pain or swelling; No muscle, back, or extremity pain  PSYCHIATRIC: No depression, anxiety, mood swings, or difficulty sleeping  HEME/LYMPH: No easy bruising, or bleeding gums  ALLERY AND IMMUNOLOGIC: No hives or eczema	    [ ] All others negative	  [ ] Unable to obtain    PHYSICAL EXAM:  T(C): 36.8 (06-15-18 @ 20:38), Max: 36.8 (06-15-18 @ 05:11)  HR: 66 (06-15-18 @ 20:38) (46 - 88)  BP: 123/44 (06-15-18 @ 20:38) (123/44 - 143/42)  RR: 20 (06-15-18 @ 20:38) (17 - 20)  SpO2: 94% (06-15-18 @ 20:38) (94% - 96%)  Wt(kg): --  I&O's Summary      Appearance: Normal	  HEENT:   Normal oral mucosa, PERRL, EOMI	  Lymphatic: No lymphadenopathy  Cardiovascular: Normal S1 S2, No JVD, No murmurs, No edema  Respiratory: Lungs clear to auscultation	  Psychiatry: A & O x 3, Mood & affect appropriate  Gastrointestinal:  Soft, Non-tender, + BS	  Skin: No rashes, No ecchymoses, No cyanosis	  Neurologic: Non-focal  Extremities: Normal range of motion, No clubbing, cyanosis or edema  Vascular: Peripheral pulses palpable 2+ bilaterally    MEDICATIONS  (STANDING):  aspirin Suppository 300 milliGRAM(s) Rectal daily  atorvastatin 40 milliGRAM(s) Oral at bedtime  heparin  Injectable 5000 Unit(s) SubCutaneous every 8 hours  hydrALAZINE Injectable 10 milliGRAM(s) IV Push every 8 hours  levothyroxine Injectable 50 MICROGram(s) IV Push at bedtime  LORazepam   Injectable 0.5 milliGRAM(s) IV Push once  piperacillin/tazobactam IVPB. 3.375 Gram(s) IV Intermittent every 8 hours      TELEMETRY: 	    ECG:  	  RADIOLOGY:  OTHER: 	  	  CBC Full  -  ( 15 Colton 2018 06:25 )  WBC Count : 10.1 K/uL  Hemoglobin : 8.5 g/dL  Hematocrit : 27.0 %  Platelet Count - Automated : 150 K/uL  Mean Cell Volume : 109.9 fl  Mean Cell Hemoglobin : 34.6 pg  Mean Cell Hemoglobin Concentration : 31.5 gm/dL  Auto Neutrophil # : x  Auto Lymphocyte # : x  Auto Monocyte # : x  Auto Eosinophil # : x  Auto Basophil # : x  Auto Neutrophil % : x  Auto Lymphocyte % : x  Auto Monocyte % : x  Auto Eosinophil % : x  Auto Basophil % : x        CARDIAC MARKERS:                              8.5    10.1  )-----------( 150      ( 15 Colton 2018 06:25 )             27.0       06-15    141  |  108  |  35<H>  ----------------------------<  96  3.6   |  26  |  0.85    Ca    8.2<L>      15 Colton 2018 06:25  Phos  2.4     06-14  Mg     2.0     06-14    TPro  6.3  /  Alb  2.4<L>  /  TBili  0.7  /  DBili  x   /  AST  13  /  ALT  13  /  AlkPhos  72  06-15            proBNP:   Lipid Profile: Cholesterol 105  LDL 47  HDL 43  TG 76  Cholesterol 118  LDL 55  HDL 48  TG 76    HgA1c: Hemoglobin A1C, Whole Blood: 5.2 % (06-13 @ 18:32)    TSH: Thyroid Stimulating Hormone, Serum: 1.76 uU/mL (06-13 @ 12:14)

## 2018-06-15 NOTE — PROGRESS NOTE ADULT - ATTENDING COMMENTS
85 yo male from home HHA 12 hours ambulates with walker pmh of BPH, arthritis hypothyroidism, thoracic aneurysm  presented to the hospital with complaints of weakness, aphasia , poor responsiveness and unable to get up from toilet seat.  Admitted to ICU for concern of CVA s/p TPA monitoring and sepsis likely secondary to pna vs UTI. Patient now in the SCU       Problem/Plan - 1:  ·  Problem: CVA (cerebral vascular accident).  Plan: s/p TPA  ct head negative  MRI with ch infarcts  c/w asa and statin.      Problem/Plan - 2:  ·  Problem: Sepsis.  Plan: -UA and culture neg  blood cultures neg to date  spiked temp overnight  -cxr: Mild pulmonary vascular congestion. Small right basilar nodular opacity; suction prn  -c/w rocephin and azithromycin day 4  ID evaluation is called for fever  f/u repeat blood culture.      Problem/Plan - 3:  ·  Problem: Hypothyroidism.  Plan: c/w synthroid.      Problem/Plan - 4:  ·  Problem: Debility.  Plan: requires total assist  PT recommending SUSI.      Problem/Plan - 5:  ·  Problem: BPH (benign prostatic hyperplasia).  Plan: c/w home med flomax as tolerated.      Problem/Plan - 6:  Problem: Goals of care, counseling/discussion. Plan: DNR/DNI  will complete MOLST.

## 2018-06-16 LAB
ALBUMIN SERPL ELPH-MCNC: 2.1 G/DL — LOW (ref 3.5–5)
ALP SERPL-CCNC: 74 U/L — SIGNIFICANT CHANGE UP (ref 40–120)
ALT FLD-CCNC: 15 U/L DA — SIGNIFICANT CHANGE UP (ref 10–60)
ANION GAP SERPL CALC-SCNC: 8 MMOL/L — SIGNIFICANT CHANGE UP (ref 5–17)
AST SERPL-CCNC: 16 U/L — SIGNIFICANT CHANGE UP (ref 10–40)
BASOPHILS # BLD AUTO: 0.1 K/UL — SIGNIFICANT CHANGE UP (ref 0–0.2)
BASOPHILS NFR BLD AUTO: 0.6 % — SIGNIFICANT CHANGE UP (ref 0–2)
BILIRUB SERPL-MCNC: 0.3 MG/DL — SIGNIFICANT CHANGE UP (ref 0.2–1.2)
BUN SERPL-MCNC: 50 MG/DL — HIGH (ref 7–18)
CALCIUM SERPL-MCNC: 8.5 MG/DL — SIGNIFICANT CHANGE UP (ref 8.4–10.5)
CHLORIDE SERPL-SCNC: 109 MMOL/L — HIGH (ref 96–108)
CO2 SERPL-SCNC: 23 MMOL/L — SIGNIFICANT CHANGE UP (ref 22–31)
CREAT SERPL-MCNC: 0.97 MG/DL — SIGNIFICANT CHANGE UP (ref 0.5–1.3)
EOSINOPHIL # BLD AUTO: 0 K/UL — SIGNIFICANT CHANGE UP (ref 0–0.5)
EOSINOPHIL NFR BLD AUTO: 0 % — SIGNIFICANT CHANGE UP (ref 0–6)
GLUCOSE SERPL-MCNC: 133 MG/DL — HIGH (ref 70–99)
HCT VFR BLD CALC: 25.8 % — LOW (ref 39–50)
HGB BLD-MCNC: 7.8 G/DL — LOW (ref 13–17)
LYMPHOCYTES # BLD AUTO: 1.3 K/UL — SIGNIFICANT CHANGE UP (ref 1–3.3)
LYMPHOCYTES # BLD AUTO: 14.2 % — SIGNIFICANT CHANGE UP (ref 13–44)
MAGNESIUM SERPL-MCNC: 2 MG/DL — SIGNIFICANT CHANGE UP (ref 1.6–2.6)
MCHC RBC-ENTMCNC: 30.2 GM/DL — LOW (ref 32–36)
MCHC RBC-ENTMCNC: 33 PG — SIGNIFICANT CHANGE UP (ref 27–34)
MCV RBC AUTO: 109.1 FL — HIGH (ref 80–100)
MONOCYTES # BLD AUTO: 0.6 K/UL — SIGNIFICANT CHANGE UP (ref 0–0.9)
MONOCYTES NFR BLD AUTO: 6.1 % — SIGNIFICANT CHANGE UP (ref 2–14)
NEUTROPHILS # BLD AUTO: 7.5 K/UL — HIGH (ref 1.8–7.4)
NEUTROPHILS NFR BLD AUTO: 79.1 % — HIGH (ref 43–77)
NT-PROBNP SERPL-SCNC: 7688 PG/ML — HIGH (ref 0–450)
PHOSPHATE SERPL-MCNC: 2.9 MG/DL — SIGNIFICANT CHANGE UP (ref 2.5–4.5)
PLATELET # BLD AUTO: 152 K/UL — SIGNIFICANT CHANGE UP (ref 150–400)
POTASSIUM SERPL-MCNC: 3.4 MMOL/L — LOW (ref 3.5–5.3)
POTASSIUM SERPL-SCNC: 3.4 MMOL/L — LOW (ref 3.5–5.3)
PROCALCITONIN SERPL-MCNC: 0.13 NG/ML — HIGH (ref 0.02–0.1)
PROT SERPL-MCNC: 6 G/DL — SIGNIFICANT CHANGE UP (ref 6–8.3)
RBC # BLD: 2.37 M/UL — LOW (ref 4.2–5.8)
RBC # FLD: 20.6 % — HIGH (ref 10.3–14.5)
SODIUM SERPL-SCNC: 140 MMOL/L — SIGNIFICANT CHANGE UP (ref 135–145)
WBC # BLD: 9.4 K/UL — SIGNIFICANT CHANGE UP (ref 3.8–10.5)
WBC # FLD AUTO: 9.4 K/UL — SIGNIFICANT CHANGE UP (ref 3.8–10.5)

## 2018-06-16 PROCEDURE — 74176 CT ABD & PELVIS W/O CONTRAST: CPT | Mod: 26

## 2018-06-16 PROCEDURE — 71250 CT THORAX DX C-: CPT | Mod: 26

## 2018-06-16 RX ORDER — LANOLIN ALCOHOL/MO/W.PET/CERES
5 CREAM (GRAM) TOPICAL ONCE
Qty: 0 | Refills: 0 | Status: DISCONTINUED | OUTPATIENT
Start: 2018-06-16 | End: 2018-06-16

## 2018-06-16 RX ORDER — POTASSIUM CHLORIDE 20 MEQ
10 PACKET (EA) ORAL
Qty: 0 | Refills: 0 | Status: COMPLETED | OUTPATIENT
Start: 2018-06-16 | End: 2018-06-16

## 2018-06-16 RX ORDER — LANOLIN ALCOHOL/MO/W.PET/CERES
3 CREAM (GRAM) TOPICAL ONCE
Qty: 0 | Refills: 0 | Status: COMPLETED | OUTPATIENT
Start: 2018-06-16 | End: 2018-06-16

## 2018-06-16 RX ADMIN — HEPARIN SODIUM 5000 UNIT(S): 5000 INJECTION INTRAVENOUS; SUBCUTANEOUS at 05:28

## 2018-06-16 RX ADMIN — Medication 300 MILLIGRAM(S): at 11:13

## 2018-06-16 RX ADMIN — ATORVASTATIN CALCIUM 40 MILLIGRAM(S): 80 TABLET, FILM COATED ORAL at 21:41

## 2018-06-16 RX ADMIN — Medication 100 MILLIEQUIVALENT(S): at 14:03

## 2018-06-16 RX ADMIN — PIPERACILLIN AND TAZOBACTAM 25 GRAM(S): 4; .5 INJECTION, POWDER, LYOPHILIZED, FOR SOLUTION INTRAVENOUS at 05:27

## 2018-06-16 RX ADMIN — HEPARIN SODIUM 5000 UNIT(S): 5000 INJECTION INTRAVENOUS; SUBCUTANEOUS at 14:03

## 2018-06-16 RX ADMIN — PIPERACILLIN AND TAZOBACTAM 25 GRAM(S): 4; .5 INJECTION, POWDER, LYOPHILIZED, FOR SOLUTION INTRAVENOUS at 14:03

## 2018-06-16 RX ADMIN — PIPERACILLIN AND TAZOBACTAM 25 GRAM(S): 4; .5 INJECTION, POWDER, LYOPHILIZED, FOR SOLUTION INTRAVENOUS at 21:41

## 2018-06-16 RX ADMIN — Medication 100 MILLIEQUIVALENT(S): at 17:06

## 2018-06-16 RX ADMIN — Medication 3 MILLIGRAM(S): at 02:48

## 2018-06-16 RX ADMIN — HEPARIN SODIUM 5000 UNIT(S): 5000 INJECTION INTRAVENOUS; SUBCUTANEOUS at 21:41

## 2018-06-16 RX ADMIN — Medication 50 MICROGRAM(S): at 21:41

## 2018-06-16 NOTE — PROGRESS NOTE ADULT - SUBJECTIVE AND OBJECTIVE BOX
Patient is a 89y old  Male who presents with a chief complaint of weakness. aphasia (12 Jun 2018 10:18)      INTERVAL HPI/OVERNIGHT EVENTS:no acute events overnight    MEDICATIONS  (STANDING):  aspirin Suppository 300 milliGRAM(s) Rectal daily  atorvastatin 40 milliGRAM(s) Oral at bedtime  heparin  Injectable 5000 Unit(s) SubCutaneous every 8 hours  hydrALAZINE Injectable 10 milliGRAM(s) IV Push every 8 hours  levothyroxine Injectable 50 MICROGram(s) IV Push at bedtime  LORazepam   Injectable 0.5 milliGRAM(s) IV Push once  piperacillin/tazobactam IVPB. 3.375 Gram(s) IV Intermittent every 8 hours    MEDICATIONS  (PRN):  acetaminophen  Suppository 650 milliGRAM(s) Rectal every 6 hours PRN For Temp greater than 38 C (100.4 F)      Allergies    No Known Allergies    Intolerances        REVIEW OF SYSTEMS:  no current complaints, wants to get out of bed without assist     Vital Signs Last 24 Hrs  T(C): 36.8 (15 Colton 2018 20:38), Max: 36.8 (15 Colton 2018 13:53)  T(F): 98.3 (15 Colton 2018 20:38), Max: 98.3 (15 Colton 2018 13:53)  HR: 66 (15 Colton 2018 20:38) (66 - 88)  BP: 123/44 (15 Colton 2018 20:38) (123/44 - 126/89)  BP(mean): 96 (15 Colton 2018 13:53) (96 - 96)  RR: 20 (15 Colton 2018 20:38) (17 - 20)  SpO2: 94% (15 Colton 2018 20:38) (94% - 94%)    PHYSICAL EXAM:  GEN:NAD, sitting in chair being fed with assist  heent: perrla eomi  neck:soft supple  cvs:s1 plus s2  resp: scattered basilar rales  abd: soft nt nd bs positive  neuro: alert and oriented times 1-2. interactive moves all 4 extremities follows commands    LABS:                        8.5    10.1  )-----------( 150      ( 15 Colton 2018 06:25 )             27.0     06-15    141  |  108  |  35<H>  ----------------------------<  96  3.6   |  26  |  0.85    Ca    8.2<L>      15 Colton 2018 06:25  Phos  2.4     06-14  Mg     2.0     06-14    TPro  6.3  /  Alb  2.4<L>  /  TBili  0.7  /  DBili  x   /  AST  13  /  ALT  13  /  AlkPhos  72  06-15        CAPILLARY BLOOD GLUCOSE          RADIOLOGY & ADDITIONAL TESTS:    Imaging Personally Reviewed:  [ ] YES  [ ] NO    Consultant(s) Notes Reviewed:  [ ] YES  [ ] NO Patient is a 89y old  Male who presents with a chief complaint of weakness. aphasia (12 Jun 2018 10:18)      INTERVAL HPI/OVERNIGHT EVENTS:no acute events overnight    MEDICATIONS  (STANDING):  aspirin Suppository 300 milliGRAM(s) Rectal daily  atorvastatin 40 milliGRAM(s) Oral at bedtime  heparin  Injectable 5000 Unit(s) SubCutaneous every 8 hours  hydrALAZINE Injectable 10 milliGRAM(s) IV Push every 8 hours  levothyroxine Injectable 50 MICROGram(s) IV Push at bedtime  LORazepam   Injectable 0.5 milliGRAM(s) IV Push once  piperacillin/tazobactam IVPB. 3.375 Gram(s) IV Intermittent every 8 hours    MEDICATIONS  (PRN):  acetaminophen  Suppository 650 milliGRAM(s) Rectal every 6 hours PRN For Temp greater than 38 C (100.4 F)      Allergies    No Known Allergies    Intolerances        REVIEW OF SYSTEMS:  no current complaints, wants to get out of bed without assist     Vital Signs Last 24 Hrs  T(C): 36.8 (15 Colton 2018 20:38), Max: 36.8 (15 Colton 2018 13:53)  T(F): 98.3 (15 Colton 2018 20:38), Max: 98.3 (15 Colton 2018 13:53)  HR: 66 (15 Colton 2018 20:38) (66 - 88)  BP: 123/44 (15 Colton 2018 20:38) (123/44 - 126/89)  BP(mean): 96 (15 Colton 2018 13:53) (96 - 96)  RR: 20 (15 Colton 2018 20:38) (17 - 20)  SpO2: 94% (15 Colton 2018 20:38) (94% - 94%)    PHYSICAL EXAM:  GEN:NAD, sitting in chair being fed with assist  heent: perrla eomi  neck:soft supple  cvs:s1 plus s2  resp: scattered basilar rales  abd: soft nt nd bs positive  neuro: alert and oriented times 1-2. interactive moves all 4 extremities follows commands    LABS:                        8.5    10.1  )-----------( 150      ( 15 Colton 2018 06:25 )             27.0     06-15    141  |  108  |  35<H>  ----------------------------<  96  3.6   |  26  |  0.85    Ca    8.2<L>      15 Colton 2018 06:25  Phos  2.4     06-14  Mg     2.0     06-14    TPro  6.3  /  Alb  2.4<L>  /  TBili  0.7  /  DBili  x   /  AST  13  /  ALT  13  /  AlkPhos  72  06-15        CAPILLARY BLOOD GLUCOSE          RADIOLOGY & ADDITIONAL TESTS:  < from: CT Chest No Cont (06.16.18 @ 10:26) >  Large infrarenal abdominal aortic aneurysm measuring up to 6.4 cm which is also noted on the plain films of lumbar spine of 9/8/2017. 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   Bilateral pleural effusions with mild bibasilar atelectasis. Mild pulmonary vascular congestion.  Nonobstructive left renal calculus  Nonobstructing right inguinal hernia containing small bowel loops Indeterminate age severe compression fracture T12 and moderate  compression fracture L2  < end of copied text >

## 2018-06-16 NOTE — PROGRESS NOTE ADULT - PROBLEM SELECTOR PLAN 1
s/p TPA  ct head negative  MRI with ch infarcts  c/w asa and statin.  neuro signed off  PT PLAN FOR DC TO [facility]

## 2018-06-16 NOTE — PROGRESS NOTE ADULT - ASSESSMENT
89 yo male from home HHA 12 hours ambulates with walker pmh of BPH, arthiritis hypothyroidism, thoracic aneurysm  presented to the hospital with complaints of weakness, aphasia , poor resposiveness and unable to get up from toilet seat.  is being admitted to ICU for concern of CVA s/p TPA monitoring and for sepsis pneumonia vs UTI

## 2018-06-16 NOTE — PROGRESS NOTE ADULT - ATTENDING COMMENTS
Patient seen and examined with resident, Addendum to above.    pt comfortable confused    Assessment  Acute CVA s/p TPA  Fever ? PNA but appears to be more CHF / increased volume on CT    cultures neg to date    non obstructing renal stone not cause of sepsis/infection.   Neurogenic bladder  Hypothyroidism  BPH (benign prostatic hyperplasia)  Hypothyroid    plan  continue current care  empiric abx   unsure of source of infection despite pan ct scan.  ? Central ? DVT  check LE duplex  supportive care  DNR/I  prognosis poor  dispo planning to be started

## 2018-06-16 NOTE — PROGRESS NOTE ADULT - PROBLEM SELECTOR PLAN 2
-UA and culture neg  blood cultures neg to date  afebrile overnight  -cxr: Mild pulmonary vascular congestion. Small right basilar nodular opacity; suction prn  s/p rocephin and azithromycin day 4  ID evaluation is called for fever started on zosyn  RVP negative  id recs pan ct if spikes again?    f/u repeat blood culture -UA and culture neg  blood cultures neg to date  afebrile overnight  CT scan as above  non obstructing renal stones-   -cxr: Mild pulmonary vascular congestion. Small right basilar nodular opacity; suction prn  ID evaluation now on zosyn  RVP negative  f/u repeat blood culture

## 2018-06-16 NOTE — PROGRESS NOTE ADULT - SUBJECTIVE AND OBJECTIVE BOX
Id attending covering for Dr. Ruiz.  Chart is reviewed and patient is examined.     Meds:  piperacillin/tazobactam IVPB. 3.375 Gram(s) IV Intermittent every 8 hours    Allergies:  Allergies    No Known Allergies    Intolerances        ROS  [  ] UNABLE TO ELICIT    General:  [  ] None  [  ] Fever  [  ] Chills  [ x ] Malaise    Skin:  [ x ] None [  ] Rash  [  ] Wound  [  ] Ulcer    HEENT:  [ x ] None  [  ] Sore Throat  [  ] Nasal congestion/ runny nose  [  ] Photophobia [  ] Neck pain      Chest:  [  ] None   [  ] SOB  [ x ] Cough  [  ] None    Cardiovascular:   [ x ] None  [  ] CP  [  ] Palpitation    Gastrointestinal:  [ x ] None  [  ] Abd pain   [  ] Nausea    [  ] Vomiting   [  ] Diarrhea	     Genitourinary:  [ x ] None [  ] Polyuria   [  ] Urgency  [  ] Frequency  [  ] Dysuria    [  ]  Hematuria       Musculoskeletal:  [  ] None [  ] Back Pain	[  ] Body aches  [  ] Joint pain [ x ] Weakness    Neurological: [  ] None [  ]Dizziness  [  ]Visual Disturbance  [  ]Headaches   [ x ] Weakness          PHYSICAL EXAM:    Vital Signs Last 24 Hrs  T(C): 36.6 (16 Jun 2018 13:14), Max: 37.2 (16 Jun 2018 05:00)  T(F): 97.8 (16 Jun 2018 13:14), Max: 98.9 (16 Jun 2018 05:00)  HR: 80 (16 Jun 2018 13:14) (66 - 88)  BP: 129/51 (16 Jun 2018 13:14) (123/44 - 134/48)  BP(mean): 96 (15 Colton 2018 13:53) (96 - 96)  RR: 19 (16 Jun 2018 13:14) (16 - 20)  SpO2: 95% (16 Jun 2018 13:14) (92% - 95%)    HEENT: [ x ] Wnl  [  ] Pharyngeal congestion    Neck:  [ x ] Supple  [  ]Lymphadenopathy  [ x ] No JVD   [  ] JVD  [  ] Masses   [  ] WNL    CHEST/Respiratory:  [  ]Clear to auscultation  [ x ] Rales on bases  [  ] Rhonchi   [  ] Wheezing     [  ] Chest Tenderness      Cardiovascular:  [ x ] Reg S1 S2   [  ] Irreg S1 S2   [ x ]No Murmur  [  ] +ve Murmurs  [  ]Systolic [  ]Diastolic      Abdomen:  [ x ] Soft  [  ] No tendrerness  [  ] Tenderness  [  ] Organomegaly  [ x ] Mild ABD Distention  [  ] Rigidity                       [ x ] No Regidity                       [ x ] No Rebound Tenderness  [  ] No Guarding Rigidity  [  ] Rebound Tenderness[  ] Guarding Rigidity                          [ x ]  +ve Bowel Sounds  [  ] Decreased Bowel Sounds    [  ] Absent Bowel Sounds                            Extremities: [ x ] No edema [  ] Edema  [  ] Clubbing   [  ] Cyanosis                         [ x ] No Tender Calf muscles  [  ] Tender Calf muscles                        [ x ] Palpable peripheral pulses    Neurological: [ x ] Awake  [ x ] Alert  [ x ] Oriented  x  2                           [  ] Confused  [  ] Drowzy  [  ] repond to painful stimuli  [  ] Unresponsive    Skin:  [ x ] Intact [  ] Redness [  ] Thrombophlebitis  [  ] Rashes  [  ] Dry  [  ] Ulcers    Ortho:  [  ] Joint Swelling  [  ] Joint erythema [  ] Joint tenderness                [  ] Increased temp. to touch  [  ] DJD [ x ] WNL            LABS/DIAGNOSTIC TESTS                          7.8    9.4   )-----------( 152      ( 16 Jun 2018 09:38 )             25.8         06-16    140  |  109<H>  |  50<H>  ----------------------------<  133<H>  3.4<L>   |  23  |  0.97    Ca    8.5      16 Jun 2018 09:31  Phos  2.9     06-16  Mg     2.0     06-16    TPro  6.0  /  Alb  2.1<L>  /  TBili  0.3  /  DBili  x   /  AST  16  /  ALT  15  /  AlkPhos  74  06-16      LIVER FUNCTIONS - ( 16 Jun 2018 09:31 )  Alb: 2.1 g/dL / Pro: 6.0 g/dL / ALK PHOS: 74 U/L / ALT: 15 U/L DA / AST: 16 U/L / GGT: x               CULTURES:   Culture - Blood (06.15.18 @ 09:37)    Specimen Source: .Blood Blood    Culture Results:   No growth to date.    Culture - Blood (06.15.18 @ 09:37)    Specimen Source: .Blood Blood    Culture Results:   No growth to date.      Culture - Urine (06.12.18 @ 23:37)    Specimen Source: .Urine Clean Catch (Midstream)    Culture Results:   No growth        RADIOLOGY    EXAM:  CT ABDOMEN AND PELVIS                          EXAM:  CT CHEST                            PROCEDURE DATE:  06/16/2018          INTERPRETATION:  CT CHEST/ABDOMEN/PELVIS:     CLINICAL INFORMATION: Fever    COMPARISON: Chest x-ray of the previous day.    PROCEDURE:  Using multislice helical CT, 2.5 mm sections were obtained   from the thoracic inlet through the ischial tuberosities without the   administration of intravenous contrast. Oral contrast was not   administered.    Coronal and sagittal reformations were performed by  CT technologist and   made available for review.    FINDINGS:  The visualized structures of the lower neck are unremarkable.   The visualized aorta is of normal course and caliber. The heart is not   enlarged. There is no evidence of pericardial effusion.    There is no evidence of axillary, hilar, or mediastinal lymphadenopathy.   There is coronary artery calcification and atherosclerotic calcification   of the aorta.    No focal lung consolidations identified. No evidence of pneumothorax. No   pulmonary nodules identified. There are small bilateral pleural   effusions. There is passive atelectasis at the lung bases. There is   pulmonary vascular.      The liver is of normal contour. No evidence of focal hepatic lesion on   this nonenhanced examination. The gallbladder is present. No evidence of   intra or extrahepatic biliary dilatation.     The pancreas, spleen, and adrenal glands are grossly unremarkable. There   is a large nonobstructive stone in the midpole of the left kidney. There   is no evidence of hydronephrosis or perinephric stranding. No evidence of    nephrolithiasis.The bladder is unremarkable.    No evidence of lymphadenopathy utilizing CT size criteria. There is   aneurysmal dilatation of the infrarenal abdominal aorta with peripheral   calcification. The aneurysm measures up to 6.4 cm. It terminates above   the bifurcation. 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   There are bilateral inguinal hernias with fat. There are also   nonobstructed small bowel loops within the right inguinal hernia..    There is no evidence of bowel obstruction. There is no evidence of   pneumoperitoneum or free fluid.    The visualized osseous structures demonstrate osteopenia and degenerative   changes. There is moderate compression fracture of L2 which is   indeterminate in age. There is severe compression fracture of T11 with   retropulsion into the spinal canal. This is also indeterminate in age..    IMPRESSION: Large infrarenal abdominal aortic aneurysm measuring up to   6.4 cm which is also noted on the plain films of lumbar spine of   9/8/2017. 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   Bilateral pleural effusions with mild bibasilar atelectasis. Mild   pulmonary vascular congestion.  Nonobstructive left renal calculus  Nonobstructing right inguinal hernia containing small bowel loops  Indeterminate age severe compression fracture T12 and moderate   compression fracture L2      Assessment and Recommendation:   89 yo male from home [facility] 12 hours ambulates with walker pmh of BPH, arthiritis hypothyroidism, thoracic aneurysm  presented to the hospital with complaints of weakness, aphasia , poor resposiveness and unable to get up from toilet seat. history obtained by family at bedside. According to his wife, he had been "shaking" for most of the night. On 6/12/18 he awoke and was able to walk to the bathroom with his walker as usual. He sat on the toilet, and then had difficulty getting off the toiletThere was diminished verbal output. wife said he used his left arm to try to get up but she felt his right side was weak. his eyes were closed and he was not responsive. she called EMS. ROS positive for increased urinary frequency and buring for past couple of days. no chest pain , SOB cough.    in the ER Stroke code called on arrival at 7:15a, tele-stroke activated at 7:20a. VSS stable  EXCEPT tmax 102.4. ON TELE stroke patient had NIH scale of 15. b/l weakness greater on right side. code sepsis was also initiaated. labs pertinent for wbc count of q12 , ekg nsr left axis. lactate of 2.4. cxr : Mild pulmonary vascular congestion. Small right basilar nodular opacity. ct head: Moderate severe chronic small vessel ischemic changes in the frontal parietal white matter and small chronic appearing right frontal cortical infarct. TPA given at 9 am , and icu consult called.  code sepsis was also initiaated. labs pertinent for wbc count of q12 , ekg nsr left axis. lactate of 2.4. cxr : Mild pulmonary vascular congestion. Small right basilar nodular opacity. started on rocephin and zmax    # fevers unclear etiology , bladder scan with only 42 cc of urine r/o worsening pna since still coughing     plan  serum procal   pro bnp   ct chest /abd and pelvis   Continue IV Zosyn   np swab for resp viral panel   Urology evaluation for renal stones.  Fluid and electrolytes management.   CBC and CMP follow up.      d/w family

## 2018-06-16 NOTE — PROGRESS NOTE ADULT - PROBLEM SELECTOR PLAN 2
sepsis. Patient with no new fever. cultures are negative so far.  cxr: Mild pulmonary vascular congestion. Small right basilar nodular opacity;   consider follow. Overall prognosis is very poor despite any medical intervention. family fully aware.    UTI   continue antibiotics, zithro and Rocephin., Patient also with drop in H/H from 10.5 to 8.5. rule out dilutional. will repeat cbc in am if any further drop will rule out possible blood loss and will investigate. Spoke with daughter today. she does not want any invasive measures and understands that the prognosis is poor despite any medical intervention.

## 2018-06-16 NOTE — PROGRESS NOTE ADULT - PROBLEM SELECTOR PLAN 1
cva  presented with NIH score of 15  right hemiparesis and aphasia  s/p tele stroke and TPA given  neurology evaluation appreciated. MRI of head report done 6/14/2018 is noted. neurology note and follow up noted.  start on statin when able to tolerate oral . s/p TPA  hydralazine prn for bp >185/105  ct head: Moderate severe chronic small vessel ischemic changes in the frontal   parietal white matter and small chronic appearing right frontal cortical   infarct.

## 2018-06-17 LAB
ALBUMIN SERPL ELPH-MCNC: 2.2 G/DL — LOW (ref 3.5–5)
ALP SERPL-CCNC: 63 U/L — SIGNIFICANT CHANGE UP (ref 40–120)
ALT FLD-CCNC: 13 U/L DA — SIGNIFICANT CHANGE UP (ref 10–60)
ANION GAP SERPL CALC-SCNC: 7 MMOL/L — SIGNIFICANT CHANGE UP (ref 5–17)
AST SERPL-CCNC: 11 U/L — SIGNIFICANT CHANGE UP (ref 10–40)
BASOPHILS # BLD AUTO: 0.1 K/UL — SIGNIFICANT CHANGE UP (ref 0–0.2)
BASOPHILS NFR BLD AUTO: 0.7 % — SIGNIFICANT CHANGE UP (ref 0–2)
BILIRUB SERPL-MCNC: 0.4 MG/DL — SIGNIFICANT CHANGE UP (ref 0.2–1.2)
BUN SERPL-MCNC: 49 MG/DL — HIGH (ref 7–18)
CALCIUM SERPL-MCNC: 8.3 MG/DL — LOW (ref 8.4–10.5)
CHLORIDE SERPL-SCNC: 111 MMOL/L — HIGH (ref 96–108)
CO2 SERPL-SCNC: 26 MMOL/L — SIGNIFICANT CHANGE UP (ref 22–31)
CREAT SERPL-MCNC: 1 MG/DL — SIGNIFICANT CHANGE UP (ref 0.5–1.3)
CULTURE RESULTS: SIGNIFICANT CHANGE UP
CULTURE RESULTS: SIGNIFICANT CHANGE UP
EOSINOPHIL # BLD AUTO: 0 K/UL — SIGNIFICANT CHANGE UP (ref 0–0.5)
EOSINOPHIL NFR BLD AUTO: 0.5 % — SIGNIFICANT CHANGE UP (ref 0–6)
GLUCOSE SERPL-MCNC: 105 MG/DL — HIGH (ref 70–99)
HCT VFR BLD CALC: 25.5 % — LOW (ref 39–50)
HGB BLD-MCNC: 7.7 G/DL — LOW (ref 13–17)
LYMPHOCYTES # BLD AUTO: 1.2 K/UL — SIGNIFICANT CHANGE UP (ref 1–3.3)
LYMPHOCYTES # BLD AUTO: 12.8 % — LOW (ref 13–44)
MAGNESIUM SERPL-MCNC: 2 MG/DL — SIGNIFICANT CHANGE UP (ref 1.6–2.6)
MCHC RBC-ENTMCNC: 30 GM/DL — LOW (ref 32–36)
MCHC RBC-ENTMCNC: 32.7 PG — SIGNIFICANT CHANGE UP (ref 27–34)
MCV RBC AUTO: 109.2 FL — HIGH (ref 80–100)
MONOCYTES # BLD AUTO: 0.5 K/UL — SIGNIFICANT CHANGE UP (ref 0–0.9)
MONOCYTES NFR BLD AUTO: 5.5 % — SIGNIFICANT CHANGE UP (ref 2–14)
NEUTROPHILS # BLD AUTO: 7.3 K/UL — SIGNIFICANT CHANGE UP (ref 1.8–7.4)
NEUTROPHILS NFR BLD AUTO: 80.4 % — HIGH (ref 43–77)
PHOSPHATE SERPL-MCNC: 3.2 MG/DL — SIGNIFICANT CHANGE UP (ref 2.5–4.5)
PLATELET # BLD AUTO: 157 K/UL — SIGNIFICANT CHANGE UP (ref 150–400)
POTASSIUM SERPL-MCNC: 3.5 MMOL/L — SIGNIFICANT CHANGE UP (ref 3.5–5.3)
POTASSIUM SERPL-SCNC: 3.5 MMOL/L — SIGNIFICANT CHANGE UP (ref 3.5–5.3)
PROT SERPL-MCNC: 5.6 G/DL — LOW (ref 6–8.3)
RBC # BLD: 2.34 M/UL — LOW (ref 4.2–5.8)
RBC # FLD: 20.8 % — HIGH (ref 10.3–14.5)
SODIUM SERPL-SCNC: 144 MMOL/L — SIGNIFICANT CHANGE UP (ref 135–145)
SPECIMEN SOURCE: SIGNIFICANT CHANGE UP
SPECIMEN SOURCE: SIGNIFICANT CHANGE UP
WBC # BLD: 9.1 K/UL — SIGNIFICANT CHANGE UP (ref 3.8–10.5)
WBC # FLD AUTO: 9.1 K/UL — SIGNIFICANT CHANGE UP (ref 3.8–10.5)

## 2018-06-17 RX ORDER — POTASSIUM CHLORIDE 20 MEQ
40 PACKET (EA) ORAL ONCE
Qty: 0 | Refills: 0 | Status: COMPLETED | OUTPATIENT
Start: 2018-06-17 | End: 2018-06-17

## 2018-06-17 RX ORDER — SODIUM CHLORIDE 9 MG/ML
1000 INJECTION INTRAMUSCULAR; INTRAVENOUS; SUBCUTANEOUS
Qty: 0 | Refills: 0 | Status: DISCONTINUED | OUTPATIENT
Start: 2018-06-17 | End: 2018-06-19

## 2018-06-17 RX ORDER — FUROSEMIDE 40 MG
20 TABLET ORAL DAILY
Qty: 0 | Refills: 0 | Status: DISCONTINUED | OUTPATIENT
Start: 2018-06-17 | End: 2018-06-20

## 2018-06-17 RX ORDER — ASPIRIN/CALCIUM CARB/MAGNESIUM 324 MG
81 TABLET ORAL DAILY
Qty: 0 | Refills: 0 | Status: DISCONTINUED | OUTPATIENT
Start: 2018-06-17 | End: 2018-06-20

## 2018-06-17 RX ORDER — LANOLIN ALCOHOL/MO/W.PET/CERES
3 CREAM (GRAM) TOPICAL AT BEDTIME
Qty: 0 | Refills: 0 | Status: DISCONTINUED | OUTPATIENT
Start: 2018-06-17 | End: 2018-06-20

## 2018-06-17 RX ORDER — FUROSEMIDE 40 MG
20 TABLET ORAL DAILY
Qty: 0 | Refills: 0 | Status: DISCONTINUED | OUTPATIENT
Start: 2018-06-17 | End: 2018-06-17

## 2018-06-17 RX ORDER — LEVOTHYROXINE SODIUM 125 MCG
100 TABLET ORAL DAILY
Qty: 0 | Refills: 0 | Status: DISCONTINUED | OUTPATIENT
Start: 2018-06-17 | End: 2018-06-20

## 2018-06-17 RX ADMIN — Medication 40 MILLIEQUIVALENT(S): at 13:10

## 2018-06-17 RX ADMIN — Medication 81 MILLIGRAM(S): at 13:10

## 2018-06-17 RX ADMIN — HEPARIN SODIUM 5000 UNIT(S): 5000 INJECTION INTRAVENOUS; SUBCUTANEOUS at 06:00

## 2018-06-17 RX ADMIN — PIPERACILLIN AND TAZOBACTAM 25 GRAM(S): 4; .5 INJECTION, POWDER, LYOPHILIZED, FOR SOLUTION INTRAVENOUS at 13:10

## 2018-06-17 RX ADMIN — Medication 3 MILLIGRAM(S): at 02:27

## 2018-06-17 RX ADMIN — PIPERACILLIN AND TAZOBACTAM 25 GRAM(S): 4; .5 INJECTION, POWDER, LYOPHILIZED, FOR SOLUTION INTRAVENOUS at 06:00

## 2018-06-17 RX ADMIN — HEPARIN SODIUM 5000 UNIT(S): 5000 INJECTION INTRAVENOUS; SUBCUTANEOUS at 13:19

## 2018-06-17 NOTE — PROGRESS NOTE ADULT - ASSESSMENT
89 yo male from home HHA 12 hours ambulates with walker pmh of BPH, arthiritis hypothyroidism, thoracic aneurysm  presented to the hospital with complaints of weakness, aphasia , poor resposiveness and unable to get up from toilet seat.  is being admitted to ICU for concern of CVA s/p TPA monitoring and for sepsis. source of fever unclear. ID note is appreciated. Patient with anemia with drop in H/H. Will speak to ICU attending on the case for blood transfusion for low H/H. Would rule out bleed. Would consider CT of Abdomen and pelvis to rule out other bleed. Would consider Hematology consult. Patient with very poor prognosis despite any medical intervention and the family is fully aware and do not want any aggressive measures.

## 2018-06-17 NOTE — PROGRESS NOTE ADULT - PROBLEM SELECTOR PLAN 1
cva  presented with NIH score of 15  right hemiparesis and aphasia  s/p tele stroke and TPA given  neurology evaluation appreciated. 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 neurology note and follow up noted.  start on statin when able to tolerate oral . s/p TPA  hydralazine prn for bp >185/105  ct head: Moderate severe chronic small vessel ischemic changes in the frontal   parietal white matter and small chronic appearing right frontal cortical   infarct.

## 2018-06-17 NOTE — PROGRESS NOTE ADULT - PROBLEM SELECTOR PLAN 2
Currently resolved, afebrile in past 48 hrs   possible 2/2 PNA but appears to be more CHF / increased volume on CT  cultures negative to date  Non obstructing renal stone not cause of sepsis/infection  unsure of source of infection despite pan ct scan.  ? Central ? DVT  c/w Zosyn   f/u LE doppler   ID Dr. Hdez

## 2018-06-17 NOTE — PROGRESS NOTE ADULT - ASSESSMENT
85 yo male from home HHA 12 hours ambulates with walker pmh of BPH, arthritis hypothyroidism, thoracic aneurysm  presented to the hospital with complaints of weakness, aphasia , poor responsiveness and unable to get up from toilet seat.  Admitted to ICU for concern of CVA s/p TPA monitoring and sepsis likely secondary to pna vs UTI. Patient now in the SCU

## 2018-06-17 NOTE — PROGRESS NOTE ADULT - SUBJECTIVE AND OBJECTIVE BOX
Id attending covering for Dr. Ruiz.  Chart is reviewed and patient is examined.     Meds:  piperacillin/tazobactam IVPB. 3.375 Gram(s) IV Intermittent every 8 hours    Allergies:  Allergies    No Known Allergies    Intolerances        ROS  [  ] UNABLE TO ELICIT    General:  [  ] None  [  ] Fever  [  ] Chills  [ x ] Malaise    Skin:  [ x ] None [  ] Rash  [  ] Wound  [  ] Ulcer    HEENT:  [ x ] None  [  ] Sore Throat  [  ] Nasal congestion/ runny nose  [  ] Photophobia [  ] Neck pain      Chest:  [  ] None   [  ] SOB  [ x ] Cough  [  ] None    Cardiovascular:   [ x ] None  [  ] CP  [  ] Palpitation    Gastrointestinal:  [ x ] None  [  ] Abd pain   [  ] Nausea    [  ] Vomiting   [  ] Diarrhea	     Genitourinary:  [ x ] None [  ] Polyuria   [  ] Urgency  [  ] Frequency  [  ] Dysuria    [  ]  Hematuria       Musculoskeletal:  [  ] None [  ] Back Pain	[  ] Body aches  [  ] Joint pain [ x ] Weakness    Neurological: [  ] None [  ]Dizziness  [  ]Visual Disturbance  [  ]Headaches   [ x ] Weakness          PHYSICAL EXAM:  Vital Signs Last 24 Hrs  T(C): 36.4 (16 Jun 2018 20:14), Max: 37.2 (16 Jun 2018 05:00)  T(F): 97.5 (16 Jun 2018 20:14), Max: 98.9 (16 Jun 2018 05:00)  HR: 51 (16 Jun 2018 21:39) (51 - 83)  BP: 136/78 (16 Jun 2018 20:14) (129/51 - 136/78)  BP(mean): --  RR: 18 (16 Jun 2018 20:14) (16 - 19)  SpO2: 95% (16 Jun 2018 21:39) (91% - 95%)    HEENT: [ x ] Wnl  [  ] Pharyngeal congestion    Neck:  [ x ] Supple  [  ]Lymphadenopathy  [ x ] No JVD   [  ] JVD  [  ] Masses   [  ] WNL    CHEST/Respiratory:  [  ]Clear to auscultation  [ x ] Rales on bases  [  ] Rhonchi   [  ] Wheezing     [  ] Chest Tenderness      Cardiovascular:  [ x ] Reg S1 S2   [  ] Irreg S1 S2   [ x ]No Murmur  [  ] +ve Murmurs  [  ]Systolic [  ]Diastolic      Abdomen:  [ x ] Soft  [  ] No tendrerness  [  ] Tenderness  [  ] Organomegaly  [ x ] Mild ABD Distention  [  ] Rigidity                       [ x ] No Regidity                       [ x ] No Rebound Tenderness  [  ] No Guarding Rigidity  [  ] Rebound Tenderness[  ] Guarding Rigidity                          [ x ]  +ve Bowel Sounds  [  ] Decreased Bowel Sounds    [  ] Absent Bowel Sounds                            Extremities: [ x ] No edema [  ] Edema  [  ] Clubbing   [  ] Cyanosis                         [ x ] No Tender Calf muscles  [  ] Tender Calf muscles                        [ x ] Palpable peripheral pulses    Neurological: [ x ] Awake  [ x ] Alert  [ x ] Oriented  x  2                           [  ] Confused  [  ] Drowzy  [  ] repond to painful stimuli  [  ] Unresponsive    Skin:  [ x ] Intact [  ] Redness [  ] Thrombophlebitis  [  ] Rashes  [  ] Dry  [  ] Ulcers    Ortho:  [  ] Joint Swelling  [  ] Joint erythema [  ] Joint tenderness                [  ] Increased temp. to touch  [  ] DJD [ x ] WNL            LABS/DIAGNOSTIC TESTS                          7.8    9.4   )-----------( 152      ( 16 Jun 2018 09:38 )             25.8         06-16    140  |  109<H>  |  50<H>  ----------------------------<  133<H>  3.4<L>   |  23  |  0.97    Ca    8.5      16 Jun 2018 09:31  Phos  2.9     06-16  Mg     2.0     06-16    TPro  6.0  /  Alb  2.1<L>  /  TBili  0.3  /  DBili  x   /  AST  16  /  ALT  15  /  AlkPhos  74  06-16      LIVER FUNCTIONS - ( 16 Jun 2018 09:31 )  Alb: 2.1 g/dL / Pro: 6.0 g/dL / ALK PHOS: 74 U/L / ALT: 15 U/L DA / AST: 16 U/L / GGT: x           Procalcitonin, Serum: 0.13: This assay is intended for use to determine the change of PCT over time  as an aid in assessing the cumulative 28-day risk of all-cause mortality  for patients diagnosed with severe sepsis or septic shock in the ICU, or  when obtained in the emergency department or other medical wards prior to  ICU admission. This assay was performed by the Roche [employer] RegeneMedS PCT  assay. ng/mL (06.16.18 @ 10:36)            CULTURES:   Culture - Blood (06.15.18 @ 09:37)    Specimen Source: .Blood Blood    Culture Results:   No growth to date.    Culture - Blood (06.15.18 @ 09:37)    Specimen Source: .Blood Blood    Culture Results:   No growth to date.      Culture - Urine (06.12.18 @ 23:37)    Specimen Source: .Urine Clean Catch (Midstream)    Culture Results:   No growth        RADIOLOGY    EXAM:  CT ABDOMEN AND PELVIS                          EXAM:  CT CHEST                            PROCEDURE DATE:  06/16/2018          INTERPRETATION:  CT CHEST/ABDOMEN/PELVIS:     CLINICAL INFORMATION: Fever    COMPARISON: Chest x-ray of the previous day.    PROCEDURE:  Using multislice helical CT, 2.5 mm sections were obtained   from the thoracic inlet through the ischial tuberosities without the   administration of intravenous contrast. Oral contrast was not   administered.    Coronal and sagittal reformations were performed by  CT technologist and   made available for review.    FINDINGS:  The visualized structures of the lower neck are unremarkable.   The visualized aorta is of normal course and caliber. The heart is not   enlarged. There is no evidence of pericardial effusion.    There is no evidence of axillary, hilar, or mediastinal lymphadenopathy.   There is coronary artery calcification and atherosclerotic calcification   of the aorta.    No focal lung consolidations identified. No evidence of pneumothorax. No   pulmonary nodules identified. There are small bilateral pleural   effusions. There is passive atelectasis at the lung bases. There is   pulmonary vascular.      The liver is of normal contour. No evidence of focal hepatic lesion on   this nonenhanced examination. The gallbladder is present. No evidence of   intra or extrahepatic biliary dilatation.     The pancreas, spleen, and adrenal glands are grossly unremarkable. There   is a large nonobstructive stone in the midpole of the left kidney. There   is no evidence of hydronephrosis or perinephric stranding. No evidence of    nephrolithiasis. The bladder is unremarkable.    No evidence of lymphadenopathy utilizing CT size criteria. There is   aneurysmal dilatation of the infrarenal abdominal aorta with peripheral   calcification. The aneurysm measures up to 6.4 cm. It terminates above   the bifurcation. 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   There are bilateral inguinal hernias with fat. There are also   non obstructed small bowel loops within the right inguinal hernia..    There is no evidence of bowel obstruction. There is no evidence of   pneumoperitoneum or free fluid.    The visualized osseous structures demonstrate osteopenia and degenerative   changes. There is moderate compression fracture of L2 which is   indeterminate in age. There is severe compression fracture of T11 with   retropulsion into the spinal canal. This is also indeterminate in age..    IMPRESSION: Large infrarenal abdominal aortic aneurysm measuring up to   6.4 cm which is also noted on the plain films of lumbar spine of   9/8/2017. 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   Bilateral pleural effusions with mild bibasilar atelectasis. Mild   pulmonary vascular congestion.  Nonobstructive left renal calculus  Non obstructing right inguinal hernia containing small bowel loops  Indeterminate age severe compression fracture T12 and moderate   compression fracture L2      Assessment and Recommendation:   89 yo male from home HHA 12 hours ambulates with walker pmh of BPH, arthiritis hypothyroidism, thoracic aneurysm  presented to the hospital with complaints of weakness, aphasia , poor resposiveness and unable to get up from toilet seat. history obtained by family at bedside. According to his wife, he had been "shaking" for most of the night. On 6/12/18 he awoke and was able to walk to the bathroom with his walker as usual. He sat on the toilet, and then had difficulty getting off the toiletThere was diminished verbal output. wife said he used his left arm to try to get up but she felt his right side was weak. his eyes were closed and he was not responsive. she called EMS. ROS positive for increased urinary frequency and buring for past couple of days. no chest pain , SOB cough.    in the ER Stroke code called on arrival at 7:15a, tele-stroke activated at 7:20a. VSS stable  EXCEPT tmax 102.4. ON TELE stroke patient had NIH scale of 15. b/l weakness greater on right side. code sepsis was also initiaated. labs pertinent for wbc count of q12 , ekg nsr left axis. lactate of 2.4. cxr : Mild pulmonary vascular congestion. Small right basilar nodular opacity. ct head: Moderate severe chronic small vessel ischemic changes in the frontal parietal white matter and small chronic appearing right frontal cortical infarct. TPA given at 9 am , and icu consult called.  code sepsis was also initiaated. labs pertinent for wbc count of q12 , ekg nsr left axis. lactate of 2.4. cxr : Mild pulmonary vascular congestion. Small right basilar nodular opacity. started on rocephin and zmax    # fevers unclear etiology , bladder scan with only 42 cc of urine r/o worsening pna since still coughing     plan  serum procalcitonin was elevated.  pro bnp was elevated.  ct chest /abd and pelvis result is noted.  Continue IV Zosyn.  np swab for resp viral panel was -VE.   Urology evaluation for renal stones.  Fluid and electrolytes management.   CBC and CMP follow up.      Discussed with family.

## 2018-06-17 NOTE — PROGRESS NOTE ADULT - ATTENDING COMMENTS
Patient seen and examined with resident, Addendum to above.    pt comfortable confused  family bedside and updated    Assessment  Acute CVA s/p TPA  Fever ? PNA but appears to be more CHF / increased edema on CT    echo - AS with diastolic Grade 2 Dysfunction    cultures neg to date    non obstructing renal stone not cause of sepsis/infection.   Neurogenic bladder  Hypothyroidism  BPH (benign prostatic hyperplasia)  Hypothyroid    plan  continue current care  empiric abx as per ID  unsure of source of infection despite pan ct scan.  ? Central ? DVT  check LE duplex  start low dose lasix   supportive care  DNR/I  family wants to go to rehab in am.  dispo planning to be started

## 2018-06-17 NOTE — PROGRESS NOTE ADULT - SUBJECTIVE AND OBJECTIVE BOX
Patient is a 89y old  Male who presents with a chief complaint of weakness. aphasia (12 Jun 2018 10:18)    OVERNIGHT EVENTS: no acute events overnight    PMH -reviewed admission note, no change since admission    PAST MEDICAL & SURGICAL HISTORY:    ·	Neurogenic bladder  ·	Hypothyroidism  ·	BPH (benign prostatic hyperplasia)  ·	Hypothyroid    MEDICATIONS  (STANDING):    ·	aspirin Suppository 300 milliGRAM(s) Rectal daily  ·	atorvastatin 40 milliGRAM(s) Oral at bedtime  ·	heparin  Injectable 5000 Unit(s) SubCutaneous every 8 hours  ·	hydrALAZINE Injectable 10 milliGRAM(s) IV Push every 8 hours  ·	levothyroxine Injectable 50 MICROGram(s) IV Push at bedtime  ·	LORazepam   Injectable 0.5 milliGRAM(s) IV Push once  ·	piperacillin/tazobactam IVPB. 3.375 Gram(s) IV Intermittent every 8 hours    MEDICATIONS  (PRN):    ·	acetaminophen  Suppository 650 milliGRAM(s) Rectal every 6 hours PRN For Temp greater than 38 C (100.4 F)    T(C): 36.4 (06-16-18 @ 20:14), Max: 37.2 (06-16-18 @ 05:00)  HR: 51 (06-16-18 @ 21:39)  BP: 136/78 (06-16-18 @ 20:14)  RR: 18 (06-16-18 @ 20:14)  SpO2: 95% (06-16-18 @ 21:39)    PHYSICAL EXAM:    ·	GEN: NAD, sitting in chair being fed with assist  ·	heent: perrla eomi  ·	neck:soft supple  ·	cvs:s1 plus s2  ·	resp: scattered basilar rales  ·	abd: soft nt nd bs positive  ·	neuro: alert and oriented times 1-2. interactive moves all 4 extremities follows commands    LABS:  CBC Full  -  ( 16 Jun 2018 09:38 )  WBC Count : 9.4 K/uL  Hemoglobin : 7.8 g/dL  Hematocrit : 25.8 %  Platelet Count - Automated : 152 K/uL  Mean Cell Volume : 109.1 fl  Mean Cell Hemoglobin : 33.0 pg  Mean Cell Hemoglobin Concentration : 30.2 gm/dL  Auto Neutrophil # : 7.5 K/uL  Auto Lymphocyte # : 1.3 K/uL  Auto Monocyte # : 0.6 K/uL  Auto Eosinophil # : 0.0 K/uL  Auto Basophil # : 0.1 K/uL  Auto Neutrophil % : 79.1 %  Auto Lymphocyte % : 14.2 %  Auto Monocyte % : 6.1 %  Auto Eosinophil % : 0.0 %  Auto Basophil % : 0.6 %    06-16    140  |  109<H>  |  50<H>  ----------------------------<  133<H>  3.4<L>   |  23  |  0.97    Ca    8.5      16 Jun 2018 09:31  Phos  2.9     06-16  Mg     2.0     06-16    TPro  6.0  /  Alb  2.1<L>  /  TBili  0.3  /  DBili  x   /  AST  16  /  ALT  15  /  AlkPhos  74  06-16    Culture Results:   No growth to date. (06-15 @ 09:37)  Culture Results:   No growth to date. (06-15 @ 09:37)    RADIOLOGY & ADDITIONAL STUDIES REVIEWED:  reviewed     CRITICAL CARE TIME SPENT: 35 minutes

## 2018-06-17 NOTE — PROGRESS NOTE ADULT - SUBJECTIVE AND OBJECTIVE BOX
CHIEF COMPLAINT:Patient is a 89y old  Male who presents with a chief complaint of weakness. aphasia (12 Jun 2018 10:18)    	  REVIEW OF SYSTEMS:  CONSTITUTIONAL: No fever, weight loss, or fatigue  EYES: No eye pain, visual disturbances, or discharge  ENMT:  No difficulty hearing, tinnitus, vertigo; No sinus or throat pain  NECK: No pain or stiffness  RESPIRATORY: No cough, wheezing, chills or hemoptysis; No Shortness of Breath  CARDIOVASCULAR: No chest pain, palpitations, passing out, dizziness, or leg swelling  GASTROINTESTINAL: No abdominal or epigastric pain. No nausea, vomiting, or hematemesis; No diarrhea or constipation. No melena or hematochezia.  GENITOURINARY: No dysuria, frequency, hematuria, or incontinence  NEUROLOGICAL: No headaches, memory loss, loss of strength, numbness, or tremors  SKIN: No itching, burning, rashes, or lesions   LYMPH Nodes: No enlarged glands  ENDOCRINE: No heat or cold intolerance; No hair loss  MUSCULOSKELETAL: No joint pain or swelling; No muscle, back, or extremity pain  PSYCHIATRIC: No depression, anxiety, mood swings, or difficulty sleeping  HEME/LYMPH: No easy bruising, or bleeding gums  ALLERY AND IMMUNOLOGIC: No hives or eczema	    [ ] All others negative	  [ ] Unable to obtain    PHYSICAL EXAM:  T(C): 36.4 (06-17-18 @ 20:58), Max: 37.4 (06-17-18 @ 05:28)  HR: 69 (06-17-18 @ 20:58) (69 - 114)  BP: 139/58 (06-17-18 @ 20:58) (125/49 - 139/58)  RR: 18 (06-17-18 @ 20:58) (16 - 18)  SpO2: 97% (06-17-18 @ 20:58) (93% - 97%)  Wt(kg): --  I&O's Summary    16 Jun 2018 07:01  -  17 Jun 2018 07:00  --------------------------------------------------------  IN: 300 mL / OUT: 3 mL / NET: 297 mL    17 Jun 2018 07:01  -  17 Jun 2018 23:50  --------------------------------------------------------  IN: 100 mL / OUT: 0 mL / NET: 100 mL        Appearance: Normal	  HEENT:   Normal oral mucosa, PERRL, EOMI	  Lymphatic: No lymphadenopathy  Cardiovascular: Normal S1 S2, No JVD, No murmurs, No edema  Respiratory: Lungs clear to auscultation	  Psychiatry: A & O x 3, Mood & affect appropriate  Gastrointestinal:  Soft, Non-tender, + BS	  Skin: No rashes, No ecchymoses, No cyanosis	  Neurologic: Non-focal  Extremities: Normal range of motion, No clubbing, cyanosis or edema  Vascular: Peripheral pulses palpable 2+ bilaterally    MEDICATIONS  (STANDING):  aspirin  chewable 81 milliGRAM(s) Oral daily  atorvastatin 40 milliGRAM(s) Oral at bedtime  furosemide    Tablet 20 milliGRAM(s) Oral daily  heparin  Injectable 5000 Unit(s) SubCutaneous every 8 hours  levothyroxine 100 MICROGram(s) Oral daily  piperacillin/tazobactam IVPB. 3.375 Gram(s) IV Intermittent every 8 hours  sodium chloride 0.9%. 1000 milliLiter(s) (50 mL/Hr) IV Continuous <Continuous>      TELEMETRY: 	    ECG:  	  RADIOLOGY:  OTHER: 	  	  CBC Full  -  ( 17 Jun 2018 06:50 )  WBC Count : 9.1 K/uL  Hemoglobin : 7.7 g/dL  Hematocrit : 25.5 %  Platelet Count - Automated : 157 K/uL  Mean Cell Volume : 109.2 fl  Mean Cell Hemoglobin : 32.7 pg  Mean Cell Hemoglobin Concentration : 30.0 gm/dL  Auto Neutrophil # : 7.3 K/uL  Auto Lymphocyte # : 1.2 K/uL  Auto Monocyte # : 0.5 K/uL  Auto Eosinophil # : 0.0 K/uL  Auto Basophil # : 0.1 K/uL  Auto Neutrophil % : 80.4 %  Auto Lymphocyte % : 12.8 %  Auto Monocyte % : 5.5 %  Auto Eosinophil % : 0.5 %  Auto Basophil % : 0.7 %        CARDIAC MARKERS:                              7.7    9.1   )-----------( 157      ( 17 Jun 2018 06:50 )             25.5       06-17    144  |  111<H>  |  49<H>  ----------------------------<  105<H>  3.5   |  26  |  1.00    Ca    8.3<L>      17 Jun 2018 06:50  Phos  3.2     06-17  Mg     2.0     06-17    TPro  5.6<L>  /  Alb  2.2<L>  /  TBili  0.4  /  DBili  x   /  AST  11  /  ALT  13  /  AlkPhos  63  06-17            proBNP: Serum Pro-Brain Natriuretic Peptide: 7688 pg/mL (06-16 @ 07:37)    Lipid Profile: Cholesterol 105  LDL 47  HDL 43  TG 76  Cholesterol 118  LDL 55  HDL 48  TG 76    HgA1c: Hemoglobin A1C, Whole Blood: 5.2 % (06-13 @ 18:32)    TSH: Thyroid Stimulating Hormone, Serum: 1.76 uU/mL (06-13 @ 12:14)

## 2018-06-17 NOTE — PROGRESS NOTE ADULT - PROBLEM SELECTOR PLAN 2
sepsis. Patient with no new fever. cultures are negative so far.  cxr: Mild pulmonary vascular congestion. Small right basilar nodular opacity;   consider follow. Overall prognosis is very poor despite any medical intervention. family fully aware.  ID note is appreciated.    UTI   continue antibiotics, zithro and Rocephin., Patient also with drop in H/H from 10.5 to 8.5. rule out dilutional. will repeat cbc in am if any further drop will rule out possible blood loss and will investigate. Spoke with daughter today. she does not want any invasive measures and understands that the prognosis is poor despite any medical intervention.

## 2018-06-18 DIAGNOSIS — R00.1 BRADYCARDIA, UNSPECIFIED: ICD-10-CM

## 2018-06-18 DIAGNOSIS — D64.9 ANEMIA, UNSPECIFIED: ICD-10-CM

## 2018-06-18 LAB
ABO RH CONFIRMATION: SIGNIFICANT CHANGE UP
ANION GAP SERPL CALC-SCNC: 5 MMOL/L — SIGNIFICANT CHANGE UP (ref 5–17)
ANION GAP SERPL CALC-SCNC: 8 MMOL/L — SIGNIFICANT CHANGE UP (ref 5–17)
BUN SERPL-MCNC: 44 MG/DL — HIGH (ref 7–18)
BUN SERPL-MCNC: 45 MG/DL — HIGH (ref 7–18)
CALCIUM SERPL-MCNC: 8.5 MG/DL — SIGNIFICANT CHANGE UP (ref 8.4–10.5)
CALCIUM SERPL-MCNC: 8.7 MG/DL — SIGNIFICANT CHANGE UP (ref 8.4–10.5)
CHLORIDE SERPL-SCNC: 111 MMOL/L — HIGH (ref 96–108)
CHLORIDE SERPL-SCNC: 113 MMOL/L — HIGH (ref 96–108)
CO2 SERPL-SCNC: 24 MMOL/L — SIGNIFICANT CHANGE UP (ref 22–31)
CO2 SERPL-SCNC: 27 MMOL/L — SIGNIFICANT CHANGE UP (ref 22–31)
CREAT SERPL-MCNC: 0.87 MG/DL — SIGNIFICANT CHANGE UP (ref 0.5–1.3)
CREAT SERPL-MCNC: 0.91 MG/DL — SIGNIFICANT CHANGE UP (ref 0.5–1.3)
GLUCOSE SERPL-MCNC: 115 MG/DL — HIGH (ref 70–99)
GLUCOSE SERPL-MCNC: 117 MG/DL — HIGH (ref 70–99)
HCT VFR BLD CALC: 27.9 % — LOW (ref 39–50)
HGB BLD-MCNC: 8.6 G/DL — LOW (ref 13–17)
MAGNESIUM SERPL-MCNC: 2 MG/DL — SIGNIFICANT CHANGE UP (ref 1.6–2.6)
MCHC RBC-ENTMCNC: 30.8 GM/DL — LOW (ref 32–36)
MCHC RBC-ENTMCNC: 33.8 PG — SIGNIFICANT CHANGE UP (ref 27–34)
MCV RBC AUTO: 109.9 FL — HIGH (ref 80–100)
OB PNL STL: NEGATIVE — SIGNIFICANT CHANGE UP
PHOSPHATE SERPL-MCNC: 2.8 MG/DL — SIGNIFICANT CHANGE UP (ref 2.5–4.5)
PLATELET # BLD AUTO: 190 K/UL — SIGNIFICANT CHANGE UP (ref 150–400)
POTASSIUM SERPL-MCNC: 4 MMOL/L — SIGNIFICANT CHANGE UP (ref 3.5–5.3)
POTASSIUM SERPL-MCNC: 4.2 MMOL/L — SIGNIFICANT CHANGE UP (ref 3.5–5.3)
POTASSIUM SERPL-SCNC: 4 MMOL/L — SIGNIFICANT CHANGE UP (ref 3.5–5.3)
POTASSIUM SERPL-SCNC: 4.2 MMOL/L — SIGNIFICANT CHANGE UP (ref 3.5–5.3)
RBC # BLD: 2.54 M/UL — LOW (ref 4.2–5.8)
RBC # FLD: 21.1 % — HIGH (ref 10.3–14.5)
SODIUM SERPL-SCNC: 143 MMOL/L — SIGNIFICANT CHANGE UP (ref 135–145)
SODIUM SERPL-SCNC: 145 MMOL/L — SIGNIFICANT CHANGE UP (ref 135–145)
WBC # BLD: 11.8 K/UL — HIGH (ref 3.8–10.5)
WBC # FLD AUTO: 11.8 K/UL — HIGH (ref 3.8–10.5)

## 2018-06-18 PROCEDURE — 93970 EXTREMITY STUDY: CPT | Mod: 26

## 2018-06-18 RX ORDER — TAMSULOSIN HYDROCHLORIDE 0.4 MG/1
0.4 CAPSULE ORAL AT BEDTIME
Qty: 0 | Refills: 0 | Status: DISCONTINUED | OUTPATIENT
Start: 2018-06-18 | End: 2018-06-20

## 2018-06-18 RX ORDER — RISPERIDONE 4 MG/1
0.25 TABLET ORAL EVERY 6 HOURS
Qty: 0 | Refills: 0 | Status: DISCONTINUED | OUTPATIENT
Start: 2018-06-18 | End: 2018-06-20

## 2018-06-18 RX ORDER — DOCUSATE SODIUM 100 MG
100 CAPSULE ORAL
Qty: 0 | Refills: 0 | Status: DISCONTINUED | OUTPATIENT
Start: 2018-06-18 | End: 2018-06-20

## 2018-06-18 RX ORDER — SENNA PLUS 8.6 MG/1
2 TABLET ORAL AT BEDTIME
Qty: 0 | Refills: 0 | Status: DISCONTINUED | OUTPATIENT
Start: 2018-06-18 | End: 2018-06-20

## 2018-06-18 RX ADMIN — Medication 3 MILLIGRAM(S): at 00:33

## 2018-06-18 RX ADMIN — PIPERACILLIN AND TAZOBACTAM 25 GRAM(S): 4; .5 INJECTION, POWDER, LYOPHILIZED, FOR SOLUTION INTRAVENOUS at 13:28

## 2018-06-18 RX ADMIN — ATORVASTATIN CALCIUM 40 MILLIGRAM(S): 80 TABLET, FILM COATED ORAL at 00:33

## 2018-06-18 RX ADMIN — HEPARIN SODIUM 5000 UNIT(S): 5000 INJECTION INTRAVENOUS; SUBCUTANEOUS at 00:33

## 2018-06-18 RX ADMIN — HEPARIN SODIUM 5000 UNIT(S): 5000 INJECTION INTRAVENOUS; SUBCUTANEOUS at 07:00

## 2018-06-18 RX ADMIN — HEPARIN SODIUM 5000 UNIT(S): 5000 INJECTION INTRAVENOUS; SUBCUTANEOUS at 13:28

## 2018-06-18 RX ADMIN — PIPERACILLIN AND TAZOBACTAM 25 GRAM(S): 4; .5 INJECTION, POWDER, LYOPHILIZED, FOR SOLUTION INTRAVENOUS at 07:00

## 2018-06-18 RX ADMIN — PIPERACILLIN AND TAZOBACTAM 25 GRAM(S): 4; .5 INJECTION, POWDER, LYOPHILIZED, FOR SOLUTION INTRAVENOUS at 00:32

## 2018-06-18 RX ADMIN — HEPARIN SODIUM 5000 UNIT(S): 5000 INJECTION INTRAVENOUS; SUBCUTANEOUS at 22:09

## 2018-06-18 RX ADMIN — SODIUM CHLORIDE 50 MILLILITER(S): 9 INJECTION INTRAMUSCULAR; INTRAVENOUS; SUBCUTANEOUS at 00:34

## 2018-06-18 RX ADMIN — PIPERACILLIN AND TAZOBACTAM 25 GRAM(S): 4; .5 INJECTION, POWDER, LYOPHILIZED, FOR SOLUTION INTRAVENOUS at 22:10

## 2018-06-18 RX ADMIN — RISPERIDONE 0.25 MILLIGRAM(S): 4 TABLET ORAL at 22:09

## 2018-06-18 RX ADMIN — Medication 100 MILLIGRAM(S): at 18:12

## 2018-06-18 RX ADMIN — Medication 100 MICROGRAM(S): at 07:00

## 2018-06-18 RX ADMIN — Medication 81 MILLIGRAM(S): at 12:40

## 2018-06-18 NOTE — PROGRESS NOTE ADULT - SUBJECTIVE AND OBJECTIVE BOX
DNR [ x]   DNI  [ x ]    INTERVAL HPI/OVERNIGHT EVENTS: noted with bradycardia at times      PRESSORS: [ ] YES [x ] NO  WHICH:    piperacillin/tazobactam IVPB. 3.375 Gram(s) IV Intermittent every 8 hours    Cardiovascular: Normal Left Ventricular Systolic Function, (EF = 55 to 60%) Normal left ventricular internal dimensions and wall thicknesses. Grade II diastolic dysfunction.    Heart Failure  Acute   Acute on Chronic  Chronic       furosemide    Tablet 20 milliGRAM(s) Oral daily  tamsulosin 0.4 milliGRAM(s) Oral at bedtime    Pulmonary:    Hematalogic:  aspirin  chewable 81 milliGRAM(s) Oral daily  heparin  Injectable 5000 Unit(s) SubCutaneous every 8 hours    Other:  acetaminophen  Suppository 650 milliGRAM(s) Rectal every 6 hours PRN  atorvastatin 40 milliGRAM(s) Oral at bedtime  levothyroxine 100 MICROGram(s) Oral daily  melatonin 3 milliGRAM(s) Oral at bedtime PRN  sodium chloride 0.9%. 1000 milliLiter(s) IV Continuous <Continuous>    acetaminophen  Suppository 650 milliGRAM(s) Rectal every 6 hours PRN  aspirin  chewable 81 milliGRAM(s) Oral daily  atorvastatin 40 milliGRAM(s) Oral at bedtime  furosemide    Tablet 20 milliGRAM(s) Oral daily  heparin  Injectable 5000 Unit(s) SubCutaneous every 8 hours  levothyroxine 100 MICROGram(s) Oral daily  melatonin 3 milliGRAM(s) Oral at bedtime PRN  piperacillin/tazobactam IVPB. 3.375 Gram(s) IV Intermittent every 8 hours  sodium chloride 0.9%. 1000 milliLiter(s) IV Continuous <Continuous>  tamsulosin 0.4 milliGRAM(s) Oral at bedtime    Drug Dosing Weight  Height (cm): 167.64 (12 Jun 2018 07:19)  Weight (kg): 72.6 (12 Jun 2018 11:15)  BMI (kg/m2): 25.8 (12 Jun 2018 11:15)  BSA (m2): 1.82 (12 Jun 2018 11:15)    CENTRAL LINE: [ ] YES [x ] NO  LOCATION:   DATE INSERTED:  REMOVE: [ ] YES [ ] NO  EXPLAIN:    CH: [ ] YES [ ] NO    DATE INSERTED:  REMOVE:  [ ] YES [ ] NO  EXPLAIN:    A-LINE:  [ ] YES [x ] NO  LOCATION:   DATE INSERTED:  REMOVE:  [ ] YES [ ] NO  EXPLAIN:    PAST MEDICAL & SURGICAL HISTORY:  Neurogenic bladder  Hypothyroidism  BPH (benign prostatic hyperplasia)  Hypothyroid      06-17 @ 07:01  -  06-18 @ 07:00  --------------------------------------------------------  IN: 100 mL / OUT: 1 mL / NET: 99 mL        PHYSICAL EXAM:    GENERAL: NAD,   HEAD:  Atraumatic, Normocephalic  EYES: EOMI, PERRLA, conjunctiva and sclera clear  ENMT: No tonsillar erythema, exudates, or enlargement;   NECK: Supple, No JVD, Normal thyroid  NERVOUS SYSTEM:  Alert, awake, calm, cooperative. Speaking sensibly, confuse at times    CHEST/LUNG: Clear to percussion bilaterally; No rales, rhonchi, wheezing, or rubs  HEART: Regular rate and rhythm; No murmurs, rubs, or gallops  ABDOMEN: Soft, Nontender, Nondistended; Bowel sounds present  EXTREMITIES:  2+ Peripheral Pulses, No clubbing, cyanosis, or edema  LYMPH: No lymphadenopathy noted  SKIN: No rashes or lesions      LABS:                          8.6    11.8  )-----------( 190      ( 18 Jun 2018 10:32 )             27.9     CBC Full  -  ( 17 Jun 2018 06:50 )  WBC Count : 9.1 K/uL  Hemoglobin : 7.7 g/dL  Hematocrit : 25.5 %  Platelet Count - Automated : 157 K/uL  Mean Cell Volume : 109.2 fl  Mean Cell Hemoglobin : 32.7 pg  Mean Cell Hemoglobin Concentration : 30.0 gm/dL  Auto Neutrophil # : 7.3 K/uL  Auto Lymphocyte # : 1.2 K/uL  Auto Monocyte # : 0.5 K/uL  Auto Eosinophil # : 0.0 K/uL  Auto Basophil # : 0.1 K/uL  Auto Neutrophil % : 80.4 %  Auto Lymphocyte % : 12.8 %  Auto Monocyte % : 5.5 %  Auto Eosinophil % : 0.5 %  Auto Basophil % : 0.7 %    06-17 06-18    143  |  111<H>  |  44<H>  ----------------------------<  117<H>  4.2   |  27  |  0.91    Ca    8.7      18 Jun 2018 10:32  Phos  3.2     06-17  Mg     2.0     06-17    TPro  5.6<L>  /  Alb  2.2<L>  /  TBili  0.4  /  DBili  x   /  AST  11  /  ALT  13  /  AlkPhos  63  06-17      144  |  111<H>  |  49<H>  ----------------------------<  105<H>  3.5   |  26  |  1.00    Ca    8.3<L>      17 Jun 2018 06:50  Phos  3.2     06-17  Mg     2.0     06-17    TPro  5.6<L>  /  Alb  2.2<L>  /  TBili  0.4  /  DBili  x   /  AST  11  /  ALT  13  /  AlkPhos  63  06-17        [x  ]  DVT Prophylaxis - subc heparin  [x  ]  Nutrition, Brand, Rate - pureed nectar         Goal Rate         Abdominal Nutritional Status -  Malnutrition   Cachexia      Morbid Obesity BMI >/=40    RADIOLOGY & ADDITIONAL STUDIES:  ***    [  ] Goals of Care Discussion with Family/Proxy/Other           Elements of Conversation Discussed: Patient/Family understanding of current illness   Advanced Directives                                                                       Prognosis  Treatment Options  Care Aligned with patient's wishes                                             TIME SPENT: 35 minutes

## 2018-06-18 NOTE — PROGRESS NOTE ADULT - PROBLEM SELECTOR PLAN 6
Patient noted with HR 40's; /64  resting comfortable OOB-chair in NAD   EKG showing afib with slow V response  Cards (Dr. Maier) to evaluate patient  will transfer to Tele  Dr. Isaacs

## 2018-06-18 NOTE — DIETITIAN INITIAL EVALUATION ADULT. - OTHER INFO
Pt visited. oob to chair.Pt is alert but confused. D/W Grand daughter and wife at bedside. Per Granddaughter Appetite is  Not good but pt does drink mighty shakes.. S/P SLP darleen  on  06/14 dysphagia pureed  nectar liquids Pt visited. OOB  chair. Pt is alert but confused. D/W Grand daughter and wife at bedside. Per Granddaughter Appetite is  Not good but pt does drink mighty shakes.. Family requesting For extra mighty shakes w meals.. S/P SLP darleen  on  06/14 dysphagia pureed  nectar liquids. Pt seen For LOS=7 days.  Poor prognosis per MD.

## 2018-06-18 NOTE — CHART NOTE - NSCHARTNOTEFT_GEN_A_CORE
DNR [ x]   DNI  [ x ]    INTERVAL HPI/OVERNIGHT EVENTS: Patient noted to be bradycardiac - HR 40's; no c/o voiced. No chest pain, dizziness, SOB or palpitations. Tolerated OOB-Chair  PRESSORS: [ ] YES [ x] NO  WHICH:      piperacillin/tazobactam IVPB. 3.375 Gram(s) IV Intermittent every 8 hours    Cardiovascular:  Normal Left Ventricular Systolic Function, (EF = 55 to 60%) Normal left ventricular internal dimensions and wall thicknesses. Grade II diastolic dysfunction.    furosemide    Tablet 20 milliGRAM(s) Oral daily  tamsulosin 0.4 milliGRAM(s) Oral at bedtime    Pulmonary:    Hematalogic:  aspirin  chewable 81 milliGRAM(s) Oral daily  heparin  Injectable 5000 Unit(s) SubCutaneous every 8 hours    Other:  acetaminophen  Suppository 650 milliGRAM(s) Rectal every 6 hours PRN  atorvastatin 40 milliGRAM(s) Oral at bedtime  levothyroxine 100 MICROGram(s) Oral daily  melatonin 3 milliGRAM(s) Oral at bedtime PRN  sodium chloride 0.9%. 1000 milliLiter(s) IV Continuous <Continuous>    acetaminophen  Suppository 650 milliGRAM(s) Rectal every 6 hours PRN  aspirin  chewable 81 milliGRAM(s) Oral daily  atorvastatin 40 milliGRAM(s) Oral at bedtime  furosemide    Tablet 20 milliGRAM(s) Oral daily  heparin  Injectable 5000 Unit(s) SubCutaneous every 8 hours  levothyroxine 100 MICROGram(s) Oral daily  melatonin 3 milliGRAM(s) Oral at bedtime PRN  piperacillin/tazobactam IVPB. 3.375 Gram(s) IV Intermittent every 8 hours  sodium chloride 0.9%. 1000 milliLiter(s) IV Continuous <Continuous>  tamsulosin 0.4 milliGRAM(s) Oral at bedtime    Drug Dosing Weight  Height (cm): 167.64 (12 Jun 2018 07:19)  Weight (kg): 72.6 (12 Jun 2018 11:15)  BMI (kg/m2): 25.8 (12 Jun 2018 11:15)  BSA (m2): 1.82 (12 Jun 2018 11:15)    CENTRAL LINE: [ ] YES [x ] NO  LOCATION:   DATE INSERTED:  REMOVE: [ ] YES [ ] NO  EXPLAIN:    CH: [ ] YES [ x] NO    DATE INSERTED:  REMOVE:  [ ] YES [ ] NO  EXPLAIN:    A-LINE:  [ ] YES [ x] NO  LOCATION:   DATE INSERTED:  REMOVE:  [ ] YES [ ] NO  EXPLAIN:    PAST MEDICAL & SURGICAL HISTORY:  Neurogenic bladder  Hypothyroidism  BPH (benign prostatic hyperplasia)  Hypothyroid        06-17 @ 07:01  -  06-18 @ 07:00  --------------------------------------------------------  IN: 100 mL / OUT: 1 mL / NET: 99 mL            PHYSICAL EXAM:    GENERAL: NAD, well-developed  HEAD:  Atraumatic, Normocephalic  EYES: EOMI, PERRLA, conjunctiva and sclera clear  ENMT: No tonsillar erythema, exudates, or enlargement;   NECK: Supple, No JVD, Normal thyroid  NERVOUS SYSTEM:  Alert, awake, confused   CHEST/LUNG: Clear to percussion bilaterally; No rales, rhonchi, wheezing, or rubs  HEART: Regular rate and rhythm; No murmurs, rubs, or gallops  ABDOMEN: Soft, Nontender, Nondistended; Bowel sounds present  EXTREMITIES:  2+ Peripheral Pulses, No clubbing, cyanosis, or edema  LYMPH: No lymphadenopathy noted  SKIN: No rashes or lesions      LABS:  CBC Full  -  ( 18 Jun 2018 10:32 )  WBC Count : 11.8 K/uL  Hemoglobin : 8.6 g/dL  Hematocrit : 27.9 %  Platelet Count - Automated : 190 K/uL  Mean Cell Volume : 109.9 fl  Mean Cell Hemoglobin : 33.8 pg  Mean Cell Hemoglobin Concentration : 30.8 gm/dL  Auto Neutrophil # : x  Auto Lymphocyte # : x  Auto Monocyte # : x  Auto Eosinophil # : x  Auto Basophil # : x  Auto Neutrophil % : x  Auto Lymphocyte % : x  Auto Monocyte % : x  Auto Eosinophil % : x  Auto Basophil % : x    06-18    143  |  111<H>  |  44<H>  ----------------------------<  117<H>  4.2   |  27  |  0.91    Ca    8.7      18 Jun 2018 10:32  Phos  3.2     06-17  Mg     2.0     06-17    TPro  5.6<L>  /  Alb  2.2<L>  /  TBili  0.4  /  DBili  x   /  AST  11  /  ALT  13  /  AlkPhos  63  06-17                                                         TIME SPENT: 35 minutes DNR [ x]   DNI  [ x ]    INTERVAL HPI/OVERNIGHT EVENTS: Patient noted to be bradycardiac - HR 40's; no c/o voiced. No chest pain, dizziness, SOB or palpitations. Tolerated OOB-Chair  PRESSORS: [ ] YES [ x] NO  WHICH:      piperacillin/tazobactam IVPB. 3.375 Gram(s) IV Intermittent every 8 hours    Cardiovascular:  Normal Left Ventricular Systolic Function, (EF = 55 to 60%) Normal left ventricular internal dimensions and wall thicknesses. Grade II diastolic dysfunction.    furosemide    Tablet 20 milliGRAM(s) Oral daily  tamsulosin 0.4 milliGRAM(s) Oral at bedtime    Pulmonary:    Hematalogic:  aspirin  chewable 81 milliGRAM(s) Oral daily  heparin  Injectable 5000 Unit(s) SubCutaneous every 8 hours    Other:  acetaminophen  Suppository 650 milliGRAM(s) Rectal every 6 hours PRN  atorvastatin 40 milliGRAM(s) Oral at bedtime  levothyroxine 100 MICROGram(s) Oral daily  melatonin 3 milliGRAM(s) Oral at bedtime PRN  sodium chloride 0.9%. 1000 milliLiter(s) IV Continuous <Continuous>    acetaminophen  Suppository 650 milliGRAM(s) Rectal every 6 hours PRN  aspirin  chewable 81 milliGRAM(s) Oral daily  atorvastatin 40 milliGRAM(s) Oral at bedtime  furosemide    Tablet 20 milliGRAM(s) Oral daily  heparin  Injectable 5000 Unit(s) SubCutaneous every 8 hours  levothyroxine 100 MICROGram(s) Oral daily  melatonin 3 milliGRAM(s) Oral at bedtime PRN  piperacillin/tazobactam IVPB. 3.375 Gram(s) IV Intermittent every 8 hours  sodium chloride 0.9%. 1000 milliLiter(s) IV Continuous <Continuous>  tamsulosin 0.4 milliGRAM(s) Oral at bedtime    Drug Dosing Weight  Height (cm): 167.64 (12 Jun 2018 07:19)  Weight (kg): 72.6 (12 Jun 2018 11:15)  BMI (kg/m2): 25.8 (12 Jun 2018 11:15)  BSA (m2): 1.82 (12 Jun 2018 11:15)    CENTRAL LINE: [ ] YES [x ] NO  LOCATION:   DATE INSERTED:  REMOVE: [ ] YES [ ] NO  EXPLAIN:    CH: [ ] YES [ x] NO    DATE INSERTED:  REMOVE:  [ ] YES [ ] NO  EXPLAIN:    A-LINE:  [ ] YES [ x] NO  LOCATION:   DATE INSERTED:  REMOVE:  [ ] YES [ ] NO  EXPLAIN:    PAST MEDICAL & SURGICAL HISTORY:  Neurogenic bladder  Hypothyroidism  BPH (benign prostatic hyperplasia)  Hypothyroid        06-17 @ 07:01  -  06-18 @ 07:00  --------------------------------------------------------  IN: 100 mL / OUT: 1 mL / NET: 99 mL        87 yo male from home HHA 12 hours ambulates with walker pmh of BPH, arthiritis hypothyroidism, thoracic aneurysm  presented to the hospital with complaints of weakness, aphasia , poor responsiveness and unable to get up from toilet seat. In the ED, Stroke code called on arrival at 7:15a, tele-stroke activated at 7:20a. VS STABEL EXCEPT tmax 102.4. ON TELE stroke patient had NIH scale of 15. b/l weakness greater on right side. code sepsis was also initiated. labs pertinent for wbc count of q12 , ekg nsr left axis. lactate of 2.4. Pt monitored in the ICU post tPa administration. Pt was very agitated requiring IV ativan for mild sedation.  Neuro deficits appeared to subside, but difficult to assess due to patients mental status.  Carotid doppler neg for sig stenosis.  Speech therapist could not perform bedside eval due to mental status.  Repeat CT head in 24hrs neg for acute stroke. Goals of care discussed: DNR/DNI.  Neurology evaluation appreciated. 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 C/w ASA and statin. Patient with no new fever. cultures are negative so far. ID (Dr. Rosales) following c/w zosyn for now. Patient also with drop in H/H from 10.5 to 8.5.  Occult stool negative.  Today patient noted to be bradycardi. EKG showing AFIB with slow V response.   Cardiology (Dr. Umanzor) to see patient. Will transfer to tele under Dr. Isaacs's service.   Dr. Isaacs and Dr. Sarah aware.          LABS:  CBC Full  -  ( 18 Jun 2018 10:32 )  WBC Count : 11.8 K/uL  Hemoglobin : 8.6 g/dL  Hematocrit : 27.9 %  Platelet Count - Automated : 190 K/uL  Mean Cell Volume : 109.9 fl  Mean Cell Hemoglobin : 33.8 pg  Mean Cell Hemoglobin Concentration : 30.8 gm/dL  Auto Neutrophil # : x  Auto Lymphocyte # : x  Auto Monocyte # : x  Auto Eosinophil # : x  Auto Basophil # : x  Auto Neutrophil % : x  Auto Lymphocyte % : x  Auto Monocyte % : x  Auto Eosinophil % : x  Auto Basophil % : x    06-18    143  |  111<H>  |  44<H>  ----------------------------<  117<H>  4.2   |  27  |  0.91    Ca    8.7      18 Jun 2018 10:32  Phos  3.2     06-17  Mg     2.0     06-17    TPro  5.6<L>  /  Alb  2.2<L>  /  TBili  0.4  /  DBili  x   /  AST  11  /  ALT  13  /  AlkPhos  63  06-17                                                         TIME SPENT: 35 minutes DNR [ x]   DNI  [ x ]    INTERVAL HPI/OVERNIGHT EVENTS: Patient noted to be bradycardiac - HR 40's; no c/o voiced. No chest pain, dizziness, SOB or palpitations. Tolerated OOB-Chair  PRESSORS: [ ] YES [ x] NO  WHICH:      piperacillin/tazobactam IVPB. 3.375 Gram(s) IV Intermittent every 8 hours    Cardiovascular:  Normal Left Ventricular Systolic Function, (EF = 55 to 60%) Normal left ventricular internal dimensions and wall thicknesses. Grade II diastolic dysfunction.    furosemide    Tablet 20 milliGRAM(s) Oral daily  tamsulosin 0.4 milliGRAM(s) Oral at bedtime    Pulmonary:    Hematalogic:  aspirin  chewable 81 milliGRAM(s) Oral daily  heparin  Injectable 5000 Unit(s) SubCutaneous every 8 hours    Other:  acetaminophen  Suppository 650 milliGRAM(s) Rectal every 6 hours PRN  atorvastatin 40 milliGRAM(s) Oral at bedtime  levothyroxine 100 MICROGram(s) Oral daily  melatonin 3 milliGRAM(s) Oral at bedtime PRN  sodium chloride 0.9%. 1000 milliLiter(s) IV Continuous <Continuous>    acetaminophen  Suppository 650 milliGRAM(s) Rectal every 6 hours PRN  aspirin  chewable 81 milliGRAM(s) Oral daily  atorvastatin 40 milliGRAM(s) Oral at bedtime  furosemide    Tablet 20 milliGRAM(s) Oral daily  heparin  Injectable 5000 Unit(s) SubCutaneous every 8 hours  levothyroxine 100 MICROGram(s) Oral daily  melatonin 3 milliGRAM(s) Oral at bedtime PRN  piperacillin/tazobactam IVPB. 3.375 Gram(s) IV Intermittent every 8 hours  sodium chloride 0.9%. 1000 milliLiter(s) IV Continuous <Continuous>  tamsulosin 0.4 milliGRAM(s) Oral at bedtime    Drug Dosing Weight  Height (cm): 167.64 (12 Jun 2018 07:19)  Weight (kg): 72.6 (12 Jun 2018 11:15)  BMI (kg/m2): 25.8 (12 Jun 2018 11:15)  BSA (m2): 1.82 (12 Jun 2018 11:15)    CENTRAL LINE: [ ] YES [x ] NO  LOCATION:   DATE INSERTED:  REMOVE: [ ] YES [ ] NO  EXPLAIN:    HC: [ ] YES [ x] NO    DATE INSERTED:  REMOVE:  [ ] YES [ ] NO  EXPLAIN:    A-LINE:  [ ] YES [ x] NO  LOCATION:   DATE INSERTED:  REMOVE:  [ ] YES [ ] NO  EXPLAIN:    PAST MEDICAL & SURGICAL HISTORY:  Neurogenic bladder  Hypothyroidism  BPH (benign prostatic hyperplasia)  Hypothyroid        06-17 @ 07:01  -  06-18 @ 07:00  --------------------------------------------------------  IN: 100 mL / OUT: 1 mL / NET: 99 mL        87 yo male from home [place]HA 12 hours ambulates with walker pmh of BPH, arthiritis hypothyroidism, thoracic aneurysm  presented to the hospital with complaints of weakness, aphasia , poor responsiveness and unable to get up from toilet seat. In the ED, Stroke code called on arrival at 7:15a, tele-stroke activated at 7:20a. VS STABEL EXCEPT tmax 102.4. ON TELE stroke patient had NIH scale of 15. b/l weakness greater on right side. code sepsis was also initiated. labs pertinent for wbc count of q12 , ekg nsr left axis. lactate of 2.4. Pt monitored in the ICU post tPa administration. Pt was very agitated requiring IV ativan for mild sedation.  Neuro deficits appeared to subside, but difficult to assess due to patients mental status.  Carotid doppler neg for sig stenosis.  Repeat CT head in 24hrs neg for acute stroke. Goals of care discussed: DNR/DNI.  Neurology evaluation appreciated. 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 C/w ASA and statin. Patient with no new fever. cultures are negative so far. ID (Dr. Rosales) following c/w zosyn for now. Patient also with drop in H/H from 10.5 to 8.5.  Occult stool negative.  Today patient noted to be bradycardic. EKG showing AFIB with slow V response.   Cardiology (Dr. Umanzor) to see patient. Will transfer to tele under Dr. Isaacs's service.   Dr. Isaacs and Dr. Sarah aware.          LABS:  CBC Full  -  ( 18 Jun 2018 10:32 )  WBC Count : 11.8 K/uL  Hemoglobin : 8.6 g/dL  Hematocrit : 27.9 %  Platelet Count - Automated : 190 K/uL  Mean Cell Volume : 109.9 fl  Mean Cell Hemoglobin : 33.8 pg  Mean Cell Hemoglobin Concentration : 30.8 gm/dL  Auto Neutrophil # : x  Auto Lymphocyte # : x  Auto Monocyte # : x  Auto Eosinophil # : x  Auto Basophil # : x  Auto Neutrophil % : x  Auto Lymphocyte % : x  Auto Monocyte % : x  Auto Eosinophil % : x  Auto Basophil % : x    06-18    143  |  111<H>  |  44<H>  ----------------------------<  117<H>  4.2   |  27  |  0.91    Ca    8.7      18 Jun 2018 10:32  Phos  3.2     06-17  Mg     2.0     06-17    TPro  5.6<L>  /  Alb  2.2<L>  /  TBili  0.4  /  DBili  x   /  AST  11  /  ALT  13  /  AlkPhos  63  06-17                                                         TIME SPENT: 35 minutes DNR [ x]   DNI  [ x ]    INTERVAL HPI/OVERNIGHT EVENTS: Patient noted to be bradycardiac - HR 40's; no c/o voiced. No chest pain, dizziness, SOB or palpitations. Tolerated OOB-Chair  PRESSORS: [ ] YES [ x] NO  WHICH:      piperacillin/tazobactam IVPB. 3.375 Gram(s) IV Intermittent every 8 hours    Cardiovascular:  Normal Left Ventricular Systolic Function, (EF = 55 to 60%) Normal left ventricular internal dimensions and wall thicknesses. Grade II diastolic dysfunction.    furosemide    Tablet 20 milliGRAM(s) Oral daily  tamsulosin 0.4 milliGRAM(s) Oral at bedtime    Pulmonary:    Hematalogic:  aspirin  chewable 81 milliGRAM(s) Oral daily  heparin  Injectable 5000 Unit(s) SubCutaneous every 8 hours    Other:  acetaminophen  Suppository 650 milliGRAM(s) Rectal every 6 hours PRN  atorvastatin 40 milliGRAM(s) Oral at bedtime  levothyroxine 100 MICROGram(s) Oral daily  melatonin 3 milliGRAM(s) Oral at bedtime PRN  sodium chloride 0.9%. 1000 milliLiter(s) IV Continuous <Continuous>    acetaminophen  Suppository 650 milliGRAM(s) Rectal every 6 hours PRN  aspirin  chewable 81 milliGRAM(s) Oral daily  atorvastatin 40 milliGRAM(s) Oral at bedtime  furosemide    Tablet 20 milliGRAM(s) Oral daily  heparin  Injectable 5000 Unit(s) SubCutaneous every 8 hours  levothyroxine 100 MICROGram(s) Oral daily  melatonin 3 milliGRAM(s) Oral at bedtime PRN  piperacillin/tazobactam IVPB. 3.375 Gram(s) IV Intermittent every 8 hours  sodium chloride 0.9%. 1000 milliLiter(s) IV Continuous <Continuous>  tamsulosin 0.4 milliGRAM(s) Oral at bedtime    PAST MEDICAL & SURGICAL HISTORY:  Neurogenic bladder  Hypothyroidism  BPH (benign prostatic hyperplasia)  Hypothyroid        06-17 @ 07:01  -  06-18 @ 07:00  --------------------------------------------------------  IN: 100 mL / OUT: 1 mL / NET: 99 mL        85 yo male from home [place] 12 hours ambulates with walker pmh of BPH, arthiritis hypothyroidism, thoracic aneurysm  presented to the hospital with complaints of weakness, aphasia , poor responsiveness and unable to get up from toilet seat. In the ED, Stroke code called on arrival at 7:15a, tele-stroke activated at 7:20a. VS STABEL EXCEPT tmax 102.4. ON TELE stroke patient had NIH scale of 15. b/l weakness greater on right side. code sepsis was also initiated. labs pertinent for wbc count of q12 , ekg nsr left axis. lactate of 2.4. Pt monitored in the ICU post tPa administration. Pt was very agitated requiring IV ativan for mild sedation.  Neuro deficits appeared to subside, but difficult to assess due to patients mental status.  Carotid doppler neg for sig stenosis.  Repeat CT head in 24hrs neg for acute stroke. Goals of care discussed: DNR/DNI.  Neurology evaluation appreciated. 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 C/w ASA and statin. Patient with no new fever. cultures are negative so far. ID (Dr. Rosales) following c/w zosyn for now. Patient also with drop in H/H from 10.5 to 8.5.  Occult stool negative.  Today patient noted to be bradycardic. EKG showing AFIB with slow V response.   Cardiology (Dr. Umanzor) to see patient. Will transfer to tele under Dr. Isaacs's service.   Dr. Isaacs and Dr. Sarah aware.          LABS:  CBC Full  -  ( 18 Jun 2018 10:32 )  WBC Count : 11.8 K/uL  Hemoglobin : 8.6 g/dL  Hematocrit : 27.9 %  Platelet Count - Automated : 190 K/uL  Mean Cell Volume : 109.9 fl  Mean Cell Hemoglobin : 33.8 pg  Mean Cell Hemoglobin Concentration : 30.8 gm/dL  Auto Neutrophil # : x  Auto Lymphocyte # : x  Auto Monocyte # : x  Auto Eosinophil # : x  Auto Basophil # : x  Auto Neutrophil % : x  Auto Lymphocyte % : x  Auto Monocyte % : x  Auto Eosinophil % : x  Auto Basophil % : x    06-18    143  |  111<H>  |  44<H>  ----------------------------<  117<H>  4.2   |  27  |  0.91    Ca    8.7      18 Jun 2018 10:32  Phos  3.2     06-17  Mg     2.0     06-17    TPro  5.6<L>  /  Alb  2.2<L>  /  TBili  0.4  /  DBili  x   /  AST  11  /  ALT  13  /  AlkPhos  63  06-17                                                         TIME SPENT: 35 minutes DNR [ x]   DNI  [ x ]    INTERVAL HPI/OVERNIGHT EVENTS: Patient noted to be bradycardiac - HR 40's; no c/o voiced. No chest pain, dizziness, SOB or palpitations. Tolerated OOB-Chair  PRESSORS: [ ] YES [ x] NO  WHICH:      piperacillin/tazobactam IVPB. 3.375 Gram(s) IV Intermittent every 8 hours    Cardiovascular:  Normal Left Ventricular Systolic Function, (EF = 55 to 60%) Normal left ventricular internal dimensions and wall thicknesses. Grade II diastolic dysfunction.    furosemide    Tablet 20 milliGRAM(s) Oral daily  tamsulosin 0.4 milliGRAM(s) Oral at bedtime    Pulmonary:    Hematalogic:  aspirin  chewable 81 milliGRAM(s) Oral daily  heparin  Injectable 5000 Unit(s) SubCutaneous every 8 hours    Other:  acetaminophen  Suppository 650 milliGRAM(s) Rectal every 6 hours PRN  atorvastatin 40 milliGRAM(s) Oral at bedtime  levothyroxine 100 MICROGram(s) Oral daily  melatonin 3 milliGRAM(s) Oral at bedtime PRN  sodium chloride 0.9%. 1000 milliLiter(s) IV Continuous <Continuous>    acetaminophen  Suppository 650 milliGRAM(s) Rectal every 6 hours PRN  aspirin  chewable 81 milliGRAM(s) Oral daily  atorvastatin 40 milliGRAM(s) Oral at bedtime  furosemide    Tablet 20 milliGRAM(s) Oral daily  heparin  Injectable 5000 Unit(s) SubCutaneous every 8 hours  levothyroxine 100 MICROGram(s) Oral daily  melatonin 3 milliGRAM(s) Oral at bedtime PRN  piperacillin/tazobactam IVPB. 3.375 Gram(s) IV Intermittent every 8 hours  sodium chloride 0.9%. 1000 milliLiter(s) IV Continuous <Continuous>  tamsulosin 0.4 milliGRAM(s) Oral at bedtime    PAST MEDICAL & SURGICAL HISTORY:  Neurogenic bladder  Hypothyroidism  BPH (benign prostatic hyperplasia)  Hypothyroid        06-17 @ 07:01  -  06-18 @ 07:00  --------------------------------------------------------  IN: 100 mL / OUT: 1 mL / NET: 99 mL        87 yo male from home [place] 12 hours ambulates with walker pmh of BPH, arthiritis hypothyroidism, thoracic aneurysm  presented to the hospital with complaints of weakness, aphasia , poor responsiveness and unable to get up from toilet seat. In the ED, Stroke code called on arrival at 7:15a, tele-stroke activated at 7:20a. VS STABEL EXCEPT tmax 102.4. ON TELE stroke patient had NIH scale of 15. b/l weakness greater on right side. code sepsis was also initiated. labs pertinent for wbc count of q12 , ekg nsr left axis. lactate of 2.4. Pt monitored in the ICU post tPa administration. Pt was very agitated requiring IV ativan for mild sedation.  Neuro deficits appeared to subside, but difficult to assess due to patients mental status.  Carotid doppler neg for sig stenosis.  Repeat CT head in 24hrs neg for acute stroke. Goals of care discussed: DNR/DNI.  Neurology evaluation appreciated. 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 C/w ASA and statin. Patient with no new fever. cultures are negative so far. ID (Dr. Rosales) following c/w zosyn for now. Patient also with drop in H/H from 10.5 to 8.5.  Occult stool negative.  Today patient noted to be bradycardic. EKG showing AFIB with slow V response.   Cardiology (Dr. Umanzor) to see patient. Will transfer to tele under Dr. Isaacs's service.   Dr. Isaacs and Dr. Sarah aware.          LABS:  CBC Full  -  ( 18 Jun 2018 10:32 )  WBC Count : 11.8 K/uL  Hemoglobin : 8.6 g/dL  Hematocrit : 27.9 %  Platelet Count - Automated : 190 K/uL  Mean Cell Volume : 109.9 fl  Mean Cell Hemoglobin : 33.8 pg  Mean Cell Hemoglobin Concentration : 30.8 gm/dL  Auto Neutrophil # : x  Auto Lymphocyte # : x  Auto Monocyte # : x  Auto Eosinophil # : x  Auto Basophil # : x  Auto Neutrophil % : x  Auto Lymphocyte % : x  Auto Monocyte % : x  Auto Eosinophil % : x  Auto Basophil % : x    06-18    143  |  111<H>  |  44<H>  ----------------------------<  117<H>  4.2   |  27  |  0.91    Ca    8.7      18 Jun 2018 10:32  Phos  3.2     06-17  Mg     2.0     06-17    TPro  5.6<L>  /  Alb  2.2<L>  /  TBili  0.4  /  DBili  x   /  AST  11  /  ALT  13  /  AlkPhos  63  06-17

## 2018-06-18 NOTE — PROGRESS NOTE ADULT - ASSESSMENT
87 yo male from home [place] 12 hours ambulates with walker pmh of BPH, arthritis hypothyroidism, thoracic aneurysm  presented to the hospital with complaints of weakness, aphasia , poor responsiveness and unable to get up from toilet seat.  Admitted to ICU for concern of CVA s/p TPA monitoring and sepsis likely secondary to pna vs UTI. Patient now in the SCU. case fully discussed with the ICU attending and NP. Will transfer patient to Telemetry unit for observation and to rule out sick sinus syndrome.

## 2018-06-18 NOTE — PROGRESS NOTE ADULT - PROBLEM SELECTOR PLAN 3
Noted drop in H/H  s/p TPA  monitor CBC  f/u occult stool Noted drop in H/H  s/p TPA  monitor CBC  occult stool - negative

## 2018-06-18 NOTE — PROGRESS NOTE ADULT - PROBLEM SELECTOR PLAN 2
possibly 2/2 PNA but appears to be more CHF / increased volume on CT   low dose lasix initiated  cultures negative to date  c/w Zosyn for now  ID (Dr. Rosales) possibly 2/2 PNA but appears to be more CHF / increased volume on CT   low dose lasix initiated  cultures negative to date; RVP neg  c/w Zosyn for now  ID (Dr. Rosales)

## 2018-06-18 NOTE — PROGRESS NOTE ADULT - PROBLEM SELECTOR PLAN 2
possibly 2/2 PNA but appears to be more CHF / increased volume on CT   low dose lasix initiated  cultures negative to date; RVP neg  c/w Zosyn for now  ID (Dr. Rosales)

## 2018-06-18 NOTE — PROGRESS NOTE ADULT - PROBLEM SELECTOR PLAN 6
Patient noted with HR 40's; /64  resting comfortable OOB-chair in NAD  f/u EKG  Cards (Dr. Maier) to evaluate patient Patient noted with HR 40's; /64  resting comfortable OOB-chair in NAD  f/u EKG  Cards (Dr. Maier) to evaluate patient  will transfer to Tele Patient noted with HR 40's; /64  resting comfortable OOB-chair in NAD   EKG showing afib with slow V response  Cards (Dr. Maier) to evaluate patient  will transfer to Tele Patient noted with HR 40's; /64  resting comfortable OOB-chair in NAD   EKG showing afib with slow V response  Cards (Dr. Maier) to evaluate patient  will transfer to Tele  Dr. Isaacs

## 2018-06-18 NOTE — PROGRESS NOTE ADULT - SUBJECTIVE AND OBJECTIVE BOX
DNR [ x]   DNI  [ x ]    INTERVAL HPI/OVERNIGHT EVENTS: No acute events;     PRESSORS: [ ] YES [x ] NO  WHICH:    piperacillin/tazobactam IVPB. 3.375 Gram(s) IV Intermittent every 8 hours    Cardiovascular: Normal Left Ventricular Systolic Function, (EF = 55 to 60%) Normal left ventricular internal dimensions and wall thicknesses. Grade II diastolic dysfunction.    Heart Failure  Acute   Acute on Chronic  Chronic       furosemide    Tablet 20 milliGRAM(s) Oral daily  tamsulosin 0.4 milliGRAM(s) Oral at bedtime    Pulmonary:    Hematalogic:  aspirin  chewable 81 milliGRAM(s) Oral daily  heparin  Injectable 5000 Unit(s) SubCutaneous every 8 hours    Other:  acetaminophen  Suppository 650 milliGRAM(s) Rectal every 6 hours PRN  atorvastatin 40 milliGRAM(s) Oral at bedtime  levothyroxine 100 MICROGram(s) Oral daily  melatonin 3 milliGRAM(s) Oral at bedtime PRN  sodium chloride 0.9%. 1000 milliLiter(s) IV Continuous <Continuous>    acetaminophen  Suppository 650 milliGRAM(s) Rectal every 6 hours PRN  aspirin  chewable 81 milliGRAM(s) Oral daily  atorvastatin 40 milliGRAM(s) Oral at bedtime  furosemide    Tablet 20 milliGRAM(s) Oral daily  heparin  Injectable 5000 Unit(s) SubCutaneous every 8 hours  levothyroxine 100 MICROGram(s) Oral daily  melatonin 3 milliGRAM(s) Oral at bedtime PRN  piperacillin/tazobactam IVPB. 3.375 Gram(s) IV Intermittent every 8 hours  sodium chloride 0.9%. 1000 milliLiter(s) IV Continuous <Continuous>  tamsulosin 0.4 milliGRAM(s) Oral at bedtime    Drug Dosing Weight  Height (cm): 167.64 (12 Jun 2018 07:19)  Weight (kg): 72.6 (12 Jun 2018 11:15)  BMI (kg/m2): 25.8 (12 Jun 2018 11:15)  BSA (m2): 1.82 (12 Jun 2018 11:15)    CENTRAL LINE: [ ] YES [x ] NO  LOCATION:   DATE INSERTED:  REMOVE: [ ] YES [ ] NO  EXPLAIN:    CH: [ ] YES [ ] NO    DATE INSERTED:  REMOVE:  [ ] YES [ ] NO  EXPLAIN:    A-LINE:  [ ] YES [x ] NO  LOCATION:   DATE INSERTED:  REMOVE:  [ ] YES [ ] NO  EXPLAIN:    PAST MEDICAL & SURGICAL HISTORY:  Neurogenic bladder  Hypothyroidism  BPH (benign prostatic hyperplasia)  Hypothyroid      06-17 @ 07:01  -  06-18 @ 07:00  --------------------------------------------------------  IN: 100 mL / OUT: 1 mL / NET: 99 mL        PHYSICAL EXAM:    GENERAL: NAD,   HEAD:  Atraumatic, Normocephalic  EYES: EOMI, PERRLA, conjunctiva and sclera clear  ENMT: No tonsillar erythema, exudates, or enlargement;   NECK: Supple, No JVD, Normal thyroid  NERVOUS SYSTEM:  Alert, awake, confused   CHEST/LUNG: Clear to percussion bilaterally; No rales, rhonchi, wheezing, or rubs  HEART: Regular rate and rhythm; No murmurs, rubs, or gallops  ABDOMEN: Soft, Nontender, Nondistended; Bowel sounds present  EXTREMITIES:  2+ Peripheral Pulses, No clubbing, cyanosis, or edema  LYMPH: No lymphadenopathy noted  SKIN: No rashes or lesions      LABS:  CBC Full  -  ( 17 Jun 2018 06:50 )  WBC Count : 9.1 K/uL  Hemoglobin : 7.7 g/dL  Hematocrit : 25.5 %  Platelet Count - Automated : 157 K/uL  Mean Cell Volume : 109.2 fl  Mean Cell Hemoglobin : 32.7 pg  Mean Cell Hemoglobin Concentration : 30.0 gm/dL  Auto Neutrophil # : 7.3 K/uL  Auto Lymphocyte # : 1.2 K/uL  Auto Monocyte # : 0.5 K/uL  Auto Eosinophil # : 0.0 K/uL  Auto Basophil # : 0.1 K/uL  Auto Neutrophil % : 80.4 %  Auto Lymphocyte % : 12.8 %  Auto Monocyte % : 5.5 %  Auto Eosinophil % : 0.5 %  Auto Basophil % : 0.7 %    06-17    144  |  111<H>  |  49<H>  ----------------------------<  105<H>  3.5   |  26  |  1.00    Ca    8.3<L>      17 Jun 2018 06:50  Phos  3.2     06-17  Mg     2.0     06-17    TPro  5.6<L>  /  Alb  2.2<L>  /  TBili  0.4  /  DBili  x   /  AST  11  /  ALT  13  /  AlkPhos  63  06-17        [x  ]  DVT Prophylaxis - subc heparin  [x  ]  Nutrition, Brand, Rate - pureed nectar         Goal Rate         Abdominal Nutritional Status -  Malnutrition   Cachexia      Morbid Obesity BMI >/=40    RADIOLOGY & ADDITIONAL STUDIES:  ***    [  ] Goals of Care Discussion with Family/Proxy/Other           Elements of Conversation Discussed: Patient/Family understanding of current illness   Advanced Directives                                                                       Prognosis  Treatment Options  Care Aligned with patient's wishes                                             TIME SPENT: 35 minutes DNR [ x]   DNI  [ x ]    INTERVAL HPI/OVERNIGHT EVENTS: noted with bradycardia at times      PRESSORS: [ ] YES [x ] NO  WHICH:    piperacillin/tazobactam IVPB. 3.375 Gram(s) IV Intermittent every 8 hours    Cardiovascular: Normal Left Ventricular Systolic Function, (EF = 55 to 60%) Normal left ventricular internal dimensions and wall thicknesses. Grade II diastolic dysfunction.    Heart Failure  Acute   Acute on Chronic  Chronic       furosemide    Tablet 20 milliGRAM(s) Oral daily  tamsulosin 0.4 milliGRAM(s) Oral at bedtime    Pulmonary:    Hematalogic:  aspirin  chewable 81 milliGRAM(s) Oral daily  heparin  Injectable 5000 Unit(s) SubCutaneous every 8 hours    Other:  acetaminophen  Suppository 650 milliGRAM(s) Rectal every 6 hours PRN  atorvastatin 40 milliGRAM(s) Oral at bedtime  levothyroxine 100 MICROGram(s) Oral daily  melatonin 3 milliGRAM(s) Oral at bedtime PRN  sodium chloride 0.9%. 1000 milliLiter(s) IV Continuous <Continuous>    acetaminophen  Suppository 650 milliGRAM(s) Rectal every 6 hours PRN  aspirin  chewable 81 milliGRAM(s) Oral daily  atorvastatin 40 milliGRAM(s) Oral at bedtime  furosemide    Tablet 20 milliGRAM(s) Oral daily  heparin  Injectable 5000 Unit(s) SubCutaneous every 8 hours  levothyroxine 100 MICROGram(s) Oral daily  melatonin 3 milliGRAM(s) Oral at bedtime PRN  piperacillin/tazobactam IVPB. 3.375 Gram(s) IV Intermittent every 8 hours  sodium chloride 0.9%. 1000 milliLiter(s) IV Continuous <Continuous>  tamsulosin 0.4 milliGRAM(s) Oral at bedtime    Drug Dosing Weight  Height (cm): 167.64 (12 Jun 2018 07:19)  Weight (kg): 72.6 (12 Jun 2018 11:15)  BMI (kg/m2): 25.8 (12 Jun 2018 11:15)  BSA (m2): 1.82 (12 Jun 2018 11:15)    CENTRAL LINE: [ ] YES [x ] NO  LOCATION:   DATE INSERTED:  REMOVE: [ ] YES [ ] NO  EXPLAIN:    CH: [ ] YES [ ] NO    DATE INSERTED:  REMOVE:  [ ] YES [ ] NO  EXPLAIN:    A-LINE:  [ ] YES [x ] NO  LOCATION:   DATE INSERTED:  REMOVE:  [ ] YES [ ] NO  EXPLAIN:    PAST MEDICAL & SURGICAL HISTORY:  Neurogenic bladder  Hypothyroidism  BPH (benign prostatic hyperplasia)  Hypothyroid      06-17 @ 07:01  -  06-18 @ 07:00  --------------------------------------------------------  IN: 100 mL / OUT: 1 mL / NET: 99 mL        PHYSICAL EXAM:    GENERAL: NAD,   HEAD:  Atraumatic, Normocephalic  EYES: EOMI, PERRLA, conjunctiva and sclera clear  ENMT: No tonsillar erythema, exudates, or enlargement;   NECK: Supple, No JVD, Normal thyroid  NERVOUS SYSTEM:  Alert, awake, confused   CHEST/LUNG: Clear to percussion bilaterally; No rales, rhonchi, wheezing, or rubs  HEART: Regular rate and rhythm; No murmurs, rubs, or gallops  ABDOMEN: Soft, Nontender, Nondistended; Bowel sounds present  EXTREMITIES:  2+ Peripheral Pulses, No clubbing, cyanosis, or edema  LYMPH: No lymphadenopathy noted  SKIN: No rashes or lesions      LABS:  CBC Full  -  ( 17 Jun 2018 06:50 )  WBC Count : 9.1 K/uL  Hemoglobin : 7.7 g/dL  Hematocrit : 25.5 %  Platelet Count - Automated : 157 K/uL  Mean Cell Volume : 109.2 fl  Mean Cell Hemoglobin : 32.7 pg  Mean Cell Hemoglobin Concentration : 30.0 gm/dL  Auto Neutrophil # : 7.3 K/uL  Auto Lymphocyte # : 1.2 K/uL  Auto Monocyte # : 0.5 K/uL  Auto Eosinophil # : 0.0 K/uL  Auto Basophil # : 0.1 K/uL  Auto Neutrophil % : 80.4 %  Auto Lymphocyte % : 12.8 %  Auto Monocyte % : 5.5 %  Auto Eosinophil % : 0.5 %  Auto Basophil % : 0.7 %    06-17    144  |  111<H>  |  49<H>  ----------------------------<  105<H>  3.5   |  26  |  1.00    Ca    8.3<L>      17 Jun 2018 06:50  Phos  3.2     06-17  Mg     2.0     06-17    TPro  5.6<L>  /  Alb  2.2<L>  /  TBili  0.4  /  DBili  x   /  AST  11  /  ALT  13  /  AlkPhos  63  06-17        [x  ]  DVT Prophylaxis - subc heparin  [x  ]  Nutrition, Brand, Rate - pureed nectar         Goal Rate         Abdominal Nutritional Status -  Malnutrition   Cachexia      Morbid Obesity BMI >/=40    RADIOLOGY & ADDITIONAL STUDIES:  ***    [  ] Goals of Care Discussion with Family/Proxy/Other           Elements of Conversation Discussed: Patient/Family understanding of current illness   Advanced Directives                                                                       Prognosis  Treatment Options  Care Aligned with patient's wishes                                             TIME SPENT: 35 minutes DNR [ x]   DNI  [ x ]    INTERVAL HPI/OVERNIGHT EVENTS: noted with bradycardia at times      PRESSORS: [ ] YES [x ] NO  WHICH:    piperacillin/tazobactam IVPB. 3.375 Gram(s) IV Intermittent every 8 hours    Cardiovascular: Normal Left Ventricular Systolic Function, (EF = 55 to 60%) Normal left ventricular internal dimensions and wall thicknesses. Grade II diastolic dysfunction.    Heart Failure  Acute   Acute on Chronic  Chronic       furosemide    Tablet 20 milliGRAM(s) Oral daily  tamsulosin 0.4 milliGRAM(s) Oral at bedtime    Pulmonary:    Hematalogic:  aspirin  chewable 81 milliGRAM(s) Oral daily  heparin  Injectable 5000 Unit(s) SubCutaneous every 8 hours    Other:  acetaminophen  Suppository 650 milliGRAM(s) Rectal every 6 hours PRN  atorvastatin 40 milliGRAM(s) Oral at bedtime  levothyroxine 100 MICROGram(s) Oral daily  melatonin 3 milliGRAM(s) Oral at bedtime PRN  sodium chloride 0.9%. 1000 milliLiter(s) IV Continuous <Continuous>    acetaminophen  Suppository 650 milliGRAM(s) Rectal every 6 hours PRN  aspirin  chewable 81 milliGRAM(s) Oral daily  atorvastatin 40 milliGRAM(s) Oral at bedtime  furosemide    Tablet 20 milliGRAM(s) Oral daily  heparin  Injectable 5000 Unit(s) SubCutaneous every 8 hours  levothyroxine 100 MICROGram(s) Oral daily  melatonin 3 milliGRAM(s) Oral at bedtime PRN  piperacillin/tazobactam IVPB. 3.375 Gram(s) IV Intermittent every 8 hours  sodium chloride 0.9%. 1000 milliLiter(s) IV Continuous <Continuous>  tamsulosin 0.4 milliGRAM(s) Oral at bedtime    Drug Dosing Weight  Height (cm): 167.64 (12 Jun 2018 07:19)  Weight (kg): 72.6 (12 Jun 2018 11:15)  BMI (kg/m2): 25.8 (12 Jun 2018 11:15)  BSA (m2): 1.82 (12 Jun 2018 11:15)    CENTRAL LINE: [ ] YES [x ] NO  LOCATION:   DATE INSERTED:  REMOVE: [ ] YES [ ] NO  EXPLAIN:    CH: [ ] YES [ ] NO    DATE INSERTED:  REMOVE:  [ ] YES [ ] NO  EXPLAIN:    A-LINE:  [ ] YES [x ] NO  LOCATION:   DATE INSERTED:  REMOVE:  [ ] YES [ ] NO  EXPLAIN:    PAST MEDICAL & SURGICAL HISTORY:  Neurogenic bladder  Hypothyroidism  BPH (benign prostatic hyperplasia)  Hypothyroid      06-17 @ 07:01  -  06-18 @ 07:00  --------------------------------------------------------  IN: 100 mL / OUT: 1 mL / NET: 99 mL        PHYSICAL EXAM:    GENERAL: NAD,   HEAD:  Atraumatic, Normocephalic  EYES: EOMI, PERRLA, conjunctiva and sclera clear  ENMT: No tonsillar erythema, exudates, or enlargement;   NECK: Supple, No JVD, Normal thyroid  NERVOUS SYSTEM:  Alert, awake, calm, cooperative. Speaking sensibly, confuse at times    CHEST/LUNG: Clear to percussion bilaterally; No rales, rhonchi, wheezing, or rubs  HEART: Regular rate and rhythm; No murmurs, rubs, or gallops  ABDOMEN: Soft, Nontender, Nondistended; Bowel sounds present  EXTREMITIES:  2+ Peripheral Pulses, No clubbing, cyanosis, or edema  LYMPH: No lymphadenopathy noted  SKIN: No rashes or lesions      LABS:  CBC Full  -  ( 17 Jun 2018 06:50 )  WBC Count : 9.1 K/uL  Hemoglobin : 7.7 g/dL  Hematocrit : 25.5 %  Platelet Count - Automated : 157 K/uL  Mean Cell Volume : 109.2 fl  Mean Cell Hemoglobin : 32.7 pg  Mean Cell Hemoglobin Concentration : 30.0 gm/dL  Auto Neutrophil # : 7.3 K/uL  Auto Lymphocyte # : 1.2 K/uL  Auto Monocyte # : 0.5 K/uL  Auto Eosinophil # : 0.0 K/uL  Auto Basophil # : 0.1 K/uL  Auto Neutrophil % : 80.4 %  Auto Lymphocyte % : 12.8 %  Auto Monocyte % : 5.5 %  Auto Eosinophil % : 0.5 %  Auto Basophil % : 0.7 %    06-17    144  |  111<H>  |  49<H>  ----------------------------<  105<H>  3.5   |  26  |  1.00    Ca    8.3<L>      17 Jun 2018 06:50  Phos  3.2     06-17  Mg     2.0     06-17    TPro  5.6<L>  /  Alb  2.2<L>  /  TBili  0.4  /  DBili  x   /  AST  11  /  ALT  13  /  AlkPhos  63  06-17        [x  ]  DVT Prophylaxis - subc heparin  [x  ]  Nutrition, Brand, Rate - pureed nectar         Goal Rate         Abdominal Nutritional Status -  Malnutrition   Cachexia      Morbid Obesity BMI >/=40    RADIOLOGY & ADDITIONAL STUDIES:  ***    [  ] Goals of Care Discussion with Family/Proxy/Other           Elements of Conversation Discussed: Patient/Family understanding of current illness   Advanced Directives                                                                       Prognosis  Treatment Options  Care Aligned with patient's wishes                                             TIME SPENT: 35 minutes DNR [ x]   DNI  [ x ]    INTERVAL HPI/OVERNIGHT EVENTS: noted with bradycardia at times      PRESSORS: [ ] YES [x ] NO  WHICH:    piperacillin/tazobactam IVPB. 3.375 Gram(s) IV Intermittent every 8 hours    Cardiovascular: Normal Left Ventricular Systolic Function, (EF = 55 to 60%) Normal left ventricular internal dimensions and wall thicknesses. Grade II diastolic dysfunction.    Heart Failure  Acute   Acute on Chronic  Chronic       furosemide    Tablet 20 milliGRAM(s) Oral daily  tamsulosin 0.4 milliGRAM(s) Oral at bedtime    Pulmonary:    Hematalogic:  aspirin  chewable 81 milliGRAM(s) Oral daily  heparin  Injectable 5000 Unit(s) SubCutaneous every 8 hours    Other:  acetaminophen  Suppository 650 milliGRAM(s) Rectal every 6 hours PRN  atorvastatin 40 milliGRAM(s) Oral at bedtime  levothyroxine 100 MICROGram(s) Oral daily  melatonin 3 milliGRAM(s) Oral at bedtime PRN  sodium chloride 0.9%. 1000 milliLiter(s) IV Continuous <Continuous>    acetaminophen  Suppository 650 milliGRAM(s) Rectal every 6 hours PRN  aspirin  chewable 81 milliGRAM(s) Oral daily  atorvastatin 40 milliGRAM(s) Oral at bedtime  furosemide    Tablet 20 milliGRAM(s) Oral daily  heparin  Injectable 5000 Unit(s) SubCutaneous every 8 hours  levothyroxine 100 MICROGram(s) Oral daily  melatonin 3 milliGRAM(s) Oral at bedtime PRN  piperacillin/tazobactam IVPB. 3.375 Gram(s) IV Intermittent every 8 hours  sodium chloride 0.9%. 1000 milliLiter(s) IV Continuous <Continuous>  tamsulosin 0.4 milliGRAM(s) Oral at bedtime    Drug Dosing Weight  Height (cm): 167.64 (12 Jun 2018 07:19)  Weight (kg): 72.6 (12 Jun 2018 11:15)  BMI (kg/m2): 25.8 (12 Jun 2018 11:15)  BSA (m2): 1.82 (12 Jun 2018 11:15)    CENTRAL LINE: [ ] YES [x ] NO  LOCATION:   DATE INSERTED:  REMOVE: [ ] YES [ ] NO  EXPLAIN:    CH: [ ] YES [ ] NO    DATE INSERTED:  REMOVE:  [ ] YES [ ] NO  EXPLAIN:    A-LINE:  [ ] YES [x ] NO  LOCATION:   DATE INSERTED:  REMOVE:  [ ] YES [ ] NO  EXPLAIN:    PAST MEDICAL & SURGICAL HISTORY:  Neurogenic bladder  Hypothyroidism  BPH (benign prostatic hyperplasia)  Hypothyroid      06-17 @ 07:01  -  06-18 @ 07:00  --------------------------------------------------------  IN: 100 mL / OUT: 1 mL / NET: 99 mL        PHYSICAL EXAM:    GENERAL: NAD,   HEAD:  Atraumatic, Normocephalic  EYES: EOMI, PERRLA, conjunctiva and sclera clear  ENMT: No tonsillar erythema, exudates, or enlargement;   NECK: Supple, No JVD, Normal thyroid  NERVOUS SYSTEM:  Alert, awake, calm, cooperative. Speaking sensibly, confuse at times    CHEST/LUNG: Clear to percussion bilaterally; No rales, rhonchi, wheezing, or rubs  HEART: Regular rate and rhythm; No murmurs, rubs, or gallops  ABDOMEN: Soft, Nontender, Nondistended; Bowel sounds present  EXTREMITIES:  2+ Peripheral Pulses, No clubbing, cyanosis, or edema  LYMPH: No lymphadenopathy noted  SKIN: No rashes or lesions      LABS:                          8.6    11.8  )-----------( 190      ( 18 Jun 2018 10:32 )             27.9     CBC Full  -  ( 17 Jun 2018 06:50 )  WBC Count : 9.1 K/uL  Hemoglobin : 7.7 g/dL  Hematocrit : 25.5 %  Platelet Count - Automated : 157 K/uL  Mean Cell Volume : 109.2 fl  Mean Cell Hemoglobin : 32.7 pg  Mean Cell Hemoglobin Concentration : 30.0 gm/dL  Auto Neutrophil # : 7.3 K/uL  Auto Lymphocyte # : 1.2 K/uL  Auto Monocyte # : 0.5 K/uL  Auto Eosinophil # : 0.0 K/uL  Auto Basophil # : 0.1 K/uL  Auto Neutrophil % : 80.4 %  Auto Lymphocyte % : 12.8 %  Auto Monocyte % : 5.5 %  Auto Eosinophil % : 0.5 %  Auto Basophil % : 0.7 %    06-17 06-18    143  |  111<H>  |  44<H>  ----------------------------<  117<H>  4.2   |  27  |  0.91    Ca    8.7      18 Jun 2018 10:32  Phos  3.2     06-17  Mg     2.0     06-17    TPro  5.6<L>  /  Alb  2.2<L>  /  TBili  0.4  /  DBili  x   /  AST  11  /  ALT  13  /  AlkPhos  63  06-17      144  |  111<H>  |  49<H>  ----------------------------<  105<H>  3.5   |  26  |  1.00    Ca    8.3<L>      17 Jun 2018 06:50  Phos  3.2     06-17  Mg     2.0     06-17    TPro  5.6<L>  /  Alb  2.2<L>  /  TBili  0.4  /  DBili  x   /  AST  11  /  ALT  13  /  AlkPhos  63  06-17        [x  ]  DVT Prophylaxis - subc heparin  [x  ]  Nutrition, Brand, Rate - pureed nectar         Goal Rate         Abdominal Nutritional Status -  Malnutrition   Cachexia      Morbid Obesity BMI >/=40    RADIOLOGY & ADDITIONAL STUDIES:  ***    [  ] Goals of Care Discussion with Family/Proxy/Other           Elements of Conversation Discussed: Patient/Family understanding of current illness   Advanced Directives                                                                       Prognosis  Treatment Options  Care Aligned with patient's wishes                                             TIME SPENT: 35 minutes

## 2018-06-18 NOTE — PROGRESS NOTE ADULT - ATTENDING COMMENTS
Patient seen and examined with resident, Addendum to above.    episode of bradycardia in ICU noted.  unsure  family bedside and updated  bradycardia 40s this am.    Assessment  Acute CVA s/p TPA  Fever ? PNA but appears to be more CHF / increased edema on CT    echo - AS with diastolic Grade 2 Dysfunction    cultures neg to date    non obstructing renal stone not cause of sepsis/infection.   Neurogenic bladder  Hypothyroidism  BPH (benign prostatic hyperplasia)  Hypothyroid    plan  continue current care  empiric abx as per ID  unsure of source of infection despite pan ct scan.  ? Central ? DVT on epimeric abx  LE duplex is pending  start low dose lasix   supportive care  noted bradycardia ? need for PPM, suspect family will be interested if indicated    check EKG, if no major heart block needing ICU, transfer to tele case d/w Dr. Isaacs by me, case d/w Dr. Umanzor by NP   DNR/I

## 2018-06-19 ENCOUNTER — TRANSCRIPTION ENCOUNTER (OUTPATIENT)
Age: 83
End: 2018-06-19

## 2018-06-19 DIAGNOSIS — Z29.9 ENCOUNTER FOR PROPHYLACTIC MEASURES, UNSPECIFIED: ICD-10-CM

## 2018-06-19 DIAGNOSIS — I71.4 ABDOMINAL AORTIC ANEURYSM, WITHOUT RUPTURE: ICD-10-CM

## 2018-06-19 LAB
ANION GAP SERPL CALC-SCNC: 10 MMOL/L — SIGNIFICANT CHANGE UP (ref 5–17)
BUN SERPL-MCNC: 47 MG/DL — HIGH (ref 7–18)
CALCIUM SERPL-MCNC: 8.7 MG/DL — SIGNIFICANT CHANGE UP (ref 8.4–10.5)
CHLORIDE SERPL-SCNC: 111 MMOL/L — HIGH (ref 96–108)
CO2 SERPL-SCNC: 24 MMOL/L — SIGNIFICANT CHANGE UP (ref 22–31)
CREAT SERPL-MCNC: 0.96 MG/DL — SIGNIFICANT CHANGE UP (ref 0.5–1.3)
GLUCOSE SERPL-MCNC: 138 MG/DL — HIGH (ref 70–99)
HCT VFR BLD CALC: 27.6 % — LOW (ref 39–50)
HGB BLD-MCNC: 8.7 G/DL — LOW (ref 13–17)
INR BLD: 1.28 RATIO — HIGH (ref 0.88–1.16)
MCHC RBC-ENTMCNC: 31.5 GM/DL — LOW (ref 32–36)
MCHC RBC-ENTMCNC: 35.2 PG — HIGH (ref 27–34)
MCV RBC AUTO: 111.8 FL — HIGH (ref 80–100)
PLATELET # BLD AUTO: 187 K/UL — SIGNIFICANT CHANGE UP (ref 150–400)
POTASSIUM SERPL-MCNC: 3.6 MMOL/L — SIGNIFICANT CHANGE UP (ref 3.5–5.3)
POTASSIUM SERPL-SCNC: 3.6 MMOL/L — SIGNIFICANT CHANGE UP (ref 3.5–5.3)
PROTHROM AB SERPL-ACNC: 14 SEC — HIGH (ref 9.8–12.7)
RBC # BLD: 2.47 M/UL — LOW (ref 4.2–5.8)
RBC # FLD: 20 % — HIGH (ref 10.3–14.5)
SODIUM SERPL-SCNC: 145 MMOL/L — SIGNIFICANT CHANGE UP (ref 135–145)
WBC # BLD: 13 K/UL — HIGH (ref 3.8–10.5)
WBC # FLD AUTO: 13 K/UL — HIGH (ref 3.8–10.5)

## 2018-06-19 PROCEDURE — 99221 1ST HOSP IP/OBS SF/LOW 40: CPT | Mod: GC

## 2018-06-19 RX ORDER — FUROSEMIDE 40 MG
20 TABLET ORAL ONCE
Qty: 0 | Refills: 0 | Status: COMPLETED | OUTPATIENT
Start: 2018-06-19 | End: 2018-06-19

## 2018-06-19 RX ORDER — HALOPERIDOL DECANOATE 100 MG/ML
2 INJECTION INTRAMUSCULAR ONCE
Qty: 0 | Refills: 0 | Status: COMPLETED | OUTPATIENT
Start: 2018-06-19 | End: 2018-06-19

## 2018-06-19 RX ADMIN — RISPERIDONE 0.25 MILLIGRAM(S): 4 TABLET ORAL at 20:55

## 2018-06-19 RX ADMIN — ATORVASTATIN CALCIUM 40 MILLIGRAM(S): 80 TABLET, FILM COATED ORAL at 20:56

## 2018-06-19 RX ADMIN — Medication 20 MILLIGRAM(S): at 11:55

## 2018-06-19 RX ADMIN — HEPARIN SODIUM 5000 UNIT(S): 5000 INJECTION INTRAVENOUS; SUBCUTANEOUS at 20:56

## 2018-06-19 RX ADMIN — PIPERACILLIN AND TAZOBACTAM 25 GRAM(S): 4; .5 INJECTION, POWDER, LYOPHILIZED, FOR SOLUTION INTRAVENOUS at 05:54

## 2018-06-19 RX ADMIN — Medication 3 MILLIGRAM(S): at 20:56

## 2018-06-19 RX ADMIN — TAMSULOSIN HYDROCHLORIDE 0.4 MILLIGRAM(S): 0.4 CAPSULE ORAL at 20:55

## 2018-06-19 RX ADMIN — Medication 81 MILLIGRAM(S): at 11:55

## 2018-06-19 RX ADMIN — SENNA PLUS 2 TABLET(S): 8.6 TABLET ORAL at 20:56

## 2018-06-19 RX ADMIN — HALOPERIDOL DECANOATE 2 MILLIGRAM(S): 100 INJECTION INTRAMUSCULAR at 16:57

## 2018-06-19 RX ADMIN — HEPARIN SODIUM 5000 UNIT(S): 5000 INJECTION INTRAVENOUS; SUBCUTANEOUS at 05:54

## 2018-06-19 NOTE — PROGRESS NOTE ADULT - ASSESSMENT
87 yo male from home [facility] 12 hours ambulates with walker pmh of BPH, arthritis hypothyroidism, thoracic aneurysm  presented to the hospital with complaints of weakness, aphasia , poor responsiveness and unable to get up from toilet seat.  Admitted to ICU for concern of CVA s/p TPA monitoring and sepsis likely secondary to pna vs UTI. Patient now in the SCU. case fully discussed with the ICU attending and NP. Will transfer patient to Telemetry unit for observation and to rule out sick sinus syndrome. Patient had an episode of 2nd degree heart block in the ICU.

## 2018-06-19 NOTE — CONSULT NOTE ADULT - SUBJECTIVE AND OBJECTIVE BOX
HPI:  89 yo male from home [facility] 12 hours ambulates with walker pmh of BPH, arthiritis hypothyroidism, thoracic aneurysm  presented to the hospital with complaints of weakness, aphasia , poor resposiveness and unable to get up from toilet seat. In the ER Stroke code called on arrival at 7:15a, tele-stroke activated at 7:20a. VS STABEL EXCEPT tmax 102.4. ON TELE stroke patient had NIH scale of 15. b/l weakness greater on right side. code sepsis was also initiated. Moderate severe chronic small vessel ischemic changes in the frontal parietal white matter and small chronic appearing right frontal cortical   infarct. TPA given at 9 am. Patient was monitored in the ICU s/p TPA and was transferred to the floor when he was stable.   PT had CT scan of chest, abdomen and pelvis and was found to have 6.4cm infra-renal AAA. Per family the AAA was initially found approximately one year ago and measured about 5-5.5cm at the time. Patient is also being seen by cardiology for evaluation of bradycardia and is planned for a PPM.   Patient seen and examined at bedside with family present due to AAA findings. Patient denies any pain.     PAST MEDICAL & SURGICAL HISTORY:  Neurogenic bladder  Hypothyroidism  BPH (benign prostatic hyperplasia)  Hypothyroid    Review of Systems: Contained within HPI    MEDICATIONS  (STANDING):  aspirin  chewable 81 milliGRAM(s) Oral daily  atorvastatin 40 milliGRAM(s) Oral at bedtime  docusate sodium 100 milliGRAM(s) Oral two times a day  furosemide    Tablet 20 milliGRAM(s) Oral daily  heparin  Injectable 5000 Unit(s) SubCutaneous every 8 hours  levothyroxine 100 MICROGram(s) Oral daily  piperacillin/tazobactam IVPB. 3.375 Gram(s) IV Intermittent every 8 hours  senna 2 Tablet(s) Oral at bedtime  tamsulosin 0.4 milliGRAM(s) Oral at bedtime    MEDICATIONS  (PRN):  acetaminophen  Suppository 650 milliGRAM(s) Rectal every 6 hours PRN For Temp greater than 38 C (100.4 F)  melatonin 3 milliGRAM(s) Oral at bedtime PRN Insomnia  risperiDONE   Tablet 0.25 milliGRAM(s) Oral every 6 hours PRN agitation    Allergies: No Known Allergies    Vital Signs Last 24 Hrs  T(C): 36.8 (19 Jun 2018 11:00), Max: 37.1 (18 Jun 2018 23:58)  T(F): 98.2 (19 Jun 2018 11:00), Max: 98.7 (18 Jun 2018 23:58)  HR: 77 (19 Jun 2018 11:00) (45 - 77)  BP: 119/39 (19 Jun 2018 11:00) (112/43 - 134/60)  RR: 18 (19 Jun 2018 11:00) (17 - 18)  SpO2: 98% (19 Jun 2018 11:00) (90% - 98%)    Physical Exam:    General:  Appears stated age, well-groomed, well-nourished, no distress  Chest: respirations nonlabrored  Abdomen:  Soft, mild distention, mild tenderness to palpation. No masses palpable.   Extremities: no edema of erythema, warm  Skin: warm and dry  Musculoskeletal: no calf tenderness  Neuro: alert  Vascular: Palpable DP pulses bilaterally, faint    LABS:                        8.7    13.0  )-----------( 187      ( 19 Jun 2018 07:21 )             27.6     06-19    145  |  111<H>  |  47<H>  ----------------------------<  138<H>  3.6   |  24  |  0.96    Ca    8.7      19 Jun 2018 07:21  Phos  2.8     06-18  Mg     2.0     06-18    PT/INR - ( 19 Jun 2018 07:21 )   PT: 14.0 sec;   INR: 1.28 ratio      RADIOLOGY & ADDITIONAL STUDIES:  < from: CT Abdomen and Pelvis No Cont (06.16.18 @ 10:26) >  IMPRESSION: Large infrarenal abdominal aortic aneurysm measuring up to   6.4 cm which is also noted on the plain films of lumbar spine of   9/8/2017. 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   Bilateral pleural effusions with mild bibasilar atelectasis. Mild   pulmonary vascular congestion.  Nonobstructive left renal calculus  Nonobstructing right inguinal hernia containing small bowel loops  Indeterminate age severe compression fracture T12 and moderate   compression fracture L2    < end of copied text >

## 2018-06-19 NOTE — CONSULT NOTE ADULT - SUBJECTIVE AND OBJECTIVE BOX
CHIEF COMPLAINT:Patient is a 89y old  Male who presents with a chief complaint of weakness. aphasia .      HPI:  89 yo male from home HHA 12 hours ambulates with walker pmh of BPH, arthiritis hypothyroidism, Aortic aneurysm presented to the hospital with complaints of weakness, aphasia , poor resposiveness and unable to get up from toilet seat. history obtained by family at bedside. According to his wife, he had been "shaking" for most of the night. On 6/12/18 he awoke and was able to walk to the bathroom with his walker as usual. He sat on the toilet, and then had difficulty getting off the toiletThere was diminished verbal output. wife said he used his left arm to try to get up but she felt his right side was weak. his eyes were closed and he was not responsive. she called EMS. ROS positive for increased urianry frequency and buring for past couple of days. no chest pain , SOB cough.    in the ER Stroke code called on arrival at 7:15a, tele-stroke activated at 7:20a. VS STABEL EXCEPT tmax 102.4. ON TELE stroke patient had NIH scale of 15. b/l weakness greater on right side. code sepsis was also initiaated. labs pertinent for wbc count of q12 , ekg nsr left axis. lactate of 2.4. cxr : Mild pulmonary vascular congestion. Small right basilar nodular opacity. 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 TPA given at 9 am , and icu consult called.   and unable to get up from toilet seat. history obtained by family at bedside. According to his wife, he had been "shaking" for most of the night. On 6/12/18 he awoke and was able to walk to the bathroom with his walker as usual. He sat on the toilet, and then had difficulty getting off the toiletThere was diminished verbal output. wife said he used his left arm to try to get up but she felt his right side was weak. his eyes were closed and he was not responsive. she called EMS. ROS positive for increased urianry frequency and buring for past couple of days. no chest pain , SOB cough.    in the ER Stroke code called on arrival at 7:15a, tele-stroke activated at 7:20a. VS STABEL EXCEPT tmax 102.4. ON TELE stroke patient had NIH scale of 15. b/l weakness greater on right side. code sepsis was also initiaated. labs pertinent for wbc count of q12 , ekg nsr left axis. lactate of 2.4. cxr : Mild pulmonary vascular congestion. Small right basilar nodular opacity. 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 TPA given at 9 am , and icu consult called.  Pt had subsequent fever and empiric tx for pneumonia, 2 to 1 av block in ICU. Yesterday, pt was found to have bradycardia and I was aked to evaluate stefan and he was transfered to telemetry.      PAST MEDICAL & SURGICAL HISTORY:  Neurogenic bladder  Hypothyroidism  BPH (benign prostatic hyperplasia)  AAA    MEDICATIONS  (STANDING):  aspirin  chewable 81 milliGRAM(s) Oral daily  atorvastatin 40 milliGRAM(s) Oral at bedtime  docusate sodium 100 milliGRAM(s) Oral two times a day  furosemide    Tablet 20 milliGRAM(s) Oral daily  heparin  Injectable 5000 Unit(s) SubCutaneous every 8 hours  levothyroxine 100 MICROGram(s) Oral daily  piperacillin/tazobactam IVPB. 3.375 Gram(s) IV Intermittent every 8 hours  senna 2 Tablet(s) Oral at bedtime  tamsulosin 0.4 milliGRAM(s) Oral at bedtime    MEDICATIONS  (PRN):  acetaminophen  Suppository 650 milliGRAM(s) Rectal every 6 hours PRN For Temp greater than 38 C (100.4 F)  melatonin 3 milliGRAM(s) Oral at bedtime PRN Insomnia  risperiDONE   Tablet 0.25 milliGRAM(s) Oral every 6 hours PRN agitation      FAMILY HISTORY:non-contributory      SOCIAL HISTORY:    [x Non-smoker    [x] Alcohol-denies    Allergies    No Known Allergies    Intolerances    	    REVIEW OF SYSTEMS:  CONSTITUTIONAL: No fever, weight loss, or fatigue  EYES: No eye pain, visual disturbances, or discharge  ENT:  No difficulty hearing, tinnitus, vertigo; No sinus or throat pain  NECK: No pain or stiffness  RESPIRATORY: No cough, wheezing, chills or hemoptysis; No Shortness of Breath  CARDIOVASCULAR: No chest pain, palpitations, passing out, dizziness, or leg swelling  GASTROINTESTINAL: No abdominal or epigastric pain. No nausea, vomiting, or hematemesis; No diarrhea or constipation. No melena or hematochezia.  GENITOURINARY: No dysuria, frequency, hematuria, or incontinence  NEUROLOGICAL: No headaches, memory loss, loss of strength, numbness, or tremors  SKIN: No itching, burning, rashes, or lesions   LYMPH Nodes: No enlarged glands  ENDOCRINE: No heat or cold intolerance; No hair loss  MUSCULOSKELETAL: No joint pain or swelling; No muscle, back, or extremity pain  PSYCHIATRIC: No depression, anxiety, mood swings, or difficulty sleeping  HEME/LYMPH: No easy bruising, or bleeding gums  ALLERGY AND IMMUNOLOGIC: No hives or eczema	        PHYSICAL EXAM:  T(C): 36.8 (06-19-18 @ 08:27), Max: 37.1 (06-18-18 @ 23:58)  HR: 72 (06-19-18 @ 08:27) (45 - 82)  BP: 128/39 (06-19-18 @ 08:27) (112/43 - 158/62)  RR: 18 (06-19-18 @ 08:27) (16 - 18)  SpO2: 97% (06-19-18 @ 08:27) (90% - 98%)  Wt(kg): --  I&O's Summary      Appearance: Normal	  HEENT:   Normal oral mucosa, PERRL, EOMI	  Lymphatic: No lymphadenopathy  Cardiovascular: Normal S1 S2, 2/6sm  Respiratory: Lungs clear to auscultation	  Psychiatry: A & O x 3, Mood & affect appropriate  Gastrointestinal:  Soft, Non-tender, + BS	  Skin: No rashes, No ecchymoses, No cyanosis	  Neurologic: Non-focal  Extremities: Normal range of motion, No clubbing, cyanosis or edema  Vascular: Peripheral pulses palpable 2+ bilaterally    TELEMETRY: nsr,2 to 1 av block,polymorphic vt    ECG:  	nsr with 2 to 1 av block    	  LABS:	 	                        8.7    13.0  )-----------( 187      ( 19 Jun 2018 07:21 )             27.6     06-19    145  |  111<H>  |  47<H>  ----------------------------<  138<H>  3.6   |  24  |  0.96    Ca    8.7      19 Jun 2018 07:21  Phos  2.8     06-18  Mg     2.0     06-18      CT scan:    IMPRESSION: Large infrarenal abdominal aortic aneurysm measuring up to   6.4 cm which is also noted on the plain films of lumbar spine of   9/8/2017. 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   Bilateral pleural effusions with mild bibasilar atelectasis. Mild   pulmonary vascular congestion.  Nonobstructive left renal calculus  Nonobstructing right inguinal hernia containing small bowel loops  Indeterminate age severe compression fracture T12 and moderate   compression fracture L2    PROCEDURE DATE:  06/14/2018          INTERPRETATION:  .    CLINICAL INFORMATION: Aphasia. Cerebrovascular accident.    TECHNIQUE: Multiplanar multisequential MRI of the brain was acquired   without the administration of IV gadolinium.    COMPARISON: Prior head CT examination dated 6/13/2018.    FINDINGS: Multiple nonspecific foci of T2/FLAIR hyperintensity are noted   throughout the deep and periventricular white matter of the cerebral   hemispheres. There is no associated mass effect. There is no evidence of   acute ischemia on the diffusion-weighted images. A chronic lacunar   infarct is notable within the right posterior frontal corona radiata.    There is diffuse cerebral volume loss with prominence of the sulci,   fissures, and cisternal spaces which is normal for the patient's age.   Ventricular size and configuration is unremarkable. Flow-voids are noted   throughout the major intracranial vessels, on the T2 weighted images,   consistentwith their patency. The sellar region and posterior fossa   appear unremarkable.    There is scattered mucosal thickening throughout the paranasal sinuses. A   small amount of layering fluid is notable within the left side of the   nasopharynx. The mastoid air cells are clear. Calvarial signal is within   normal limits. There is evidence of left-sided cataract removal.     IMPRESSION: No acute intracranial hemorrhage or evidence of acute   ischemia.    Microvascular disease and chronic lacunar infarct within the right corona   radiata.    Echocardiogram:    OBSERVATIONS:  Mitral Valve: Normal mitral valve.  Aortic Root: Aortic Root: 4.1 cm.  Aortic Valve: Calcified trileaflet aortic valve with  decreased opening. Peak transaortic valve gradient equals  51.8 mm Hg, mean transaortic valve gradient equals 31 mm  Hg, estimated aortic valve area equals 1.3 sqcm (by  continuity equation), consistent with moderate aortic  stenosis. Peak left ventricular outflow tract gradient  equals 4mm Hg.  Left Ventricle: Normal Left Ventricular Systolic Function,  (EF = 55 to 60%) Normal left ventricular internal  dimensions and wall thicknesses. Grade II diastolic  dysfunction.  Right Heart: Normal right atrium. Normal right ventricular  sizeand function. There is mild tricuspid regurgitation.  Normal pulmonic valve.  Pericardium/PleuraNormal pericardium with no pericardial  effusion.  Hemodynamic: RA Pressure is 8 mm Hg. RV systolic pressure  is mildly increased at  42 mm Hg.

## 2018-06-19 NOTE — CONSULT NOTE ADULT - ATTENDING COMMENTS
Seen exed' dw'ed PA agree w above  Pt seen in the presence of his wife.  Pt unable to provide hist  Dementioa  Known AAA x >2yrs- elected to avoid intervention.  No Chest/Back/LE pain  Limited functional capacity/ambulation.  Abd: palp Ao NT  LE's: warm, pink, no edema, palp fem/pop/PT b/l (pop not widened)    CT non con reviewed: Athero.  AAA ~6.5cm (was 5.5 last CT)    A/P:   AAA >6cm  Asymp  Pt elderly, frail, w comorbidities and poor mental/functional status.  Previious made decisioin to avoid AAA surg.    Rec:   COnt med mgmt  Med/geriatric/palliative eval to reassess candidacy for AAA intervention (likely candidate for EVAR).  Screen direct relatives for AAA (explpained to pt's wife)

## 2018-06-19 NOTE — PROGRESS NOTE ADULT - PROBLEM SELECTOR PLAN 2
Likely secondary to PNA but appears to be more CHF / increased volume on CT  cultures negative to date  c/w Zosyn for now  ID (Dr. Rosales)

## 2018-06-19 NOTE — PROGRESS NOTE ADULT - PROBLEM SELECTOR PLAN 4
c/w synthroid. Noted drop in H/H  Stable Hemoglobin  s/p TPA  monitor CBC daily  occult stool - negative

## 2018-06-19 NOTE — CONSULT NOTE ADULT - ASSESSMENT
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 Patient currently asymptomatic and stable    - will discuss surgical options with Dr. Abreu  - patient will need medical and cardiac clearance prior to any intervention if needed  - PPM planning per cardiology  - family unsure if surgery desired and would like to discuss the risks and benefits  - will discuss with Dr. Abreu
89 yo male from home HHA 12 hours ambulates with walker pmh of BPH, arthiritis hypothyroidism, Aortic aneurysm presented to the hospital with complaints of weakness, aphasia , poor resposiveness, now tachy-gisele on tele.  1.Tele monitoring.  2.D/C IVF.  3.Lasix 20mg iv x1.  4.D/W pt's family in detail risk and benefit of PPM, await their decision.  5.ID f/u-d/c abx.  6.AAA-?EVAR eval.  7.Cont current medication.  8.GI and DVT prophylaxis.
89 yo male from home HHA 12 hours ambulates with walker pmh of BPH, arthiritis hypothyroidism, thoracic aneurysm  presented to the hospital with complaints of weakness, aphasia , poor resposiveness and unable to get up from toilet seat. history obtained by family at bedside. According to his wife, he had been "shaking" for most of the night. On 6/12/18 he awoke and was able to walk to the bathroom with his walker as usual. He sat on the toilet, and then had difficulty getting off the toiletThere was diminished verbal output. wife said he used his left arm to try to get up but she felt his right side was weak. his eyes were closed and he was not responsive. she called EMS. ROS positive for increased urinary frequency and buring for past couple of days. no chest pain , SOB cough.    in the ER Stroke code called on arrival at 7:15a, tele-stroke activated at 7:20a. VSS stable  EXCEPT tmax 102.4. ON TELE stroke patient had NIH scale of 15. b/l weakness greater on right side. code sepsis was also initiaated. labs pertinent for wbc count of q12 , ekg nsr left axis. lactate of 2.4. cxr : Mild pulmonary vascular congestion. Small right basilar nodular opacity. ct head: Moderate severe chronic small vessel ischemic changes in the frontal parietal white matter and small chronic appearing right frontal cortical infarct. TPA given at 9 am , and icu consult called.  code sepsis was also initiaated. labs pertinent for wbc count of q12 , ekg nsr left axis. lactate of 2.4. cxr : Mild pulmonary vascular congestion. Small right basilar nodular opacity. started on rocephin and zmax    # fevers unclear etiology , bladder scan with only 42 cc of urine r/o worsening pna since still coughing     plan  serum procal   pro bnp   ct chest /abd and pelvis   will change ab to zosyn   np swab for resp viral panel     thnx will f/u   d/w family
Impression:  Aphasia and weakness s/p ivtpa now with worsening mental status, unclear if it due to sedating medications for aggitation or other reasons.  Should get STAT CTH to r/o bleed after ivtpa.     Recommendations:  1.             STAT CTH    2.             telemetry, MRI brain, MRA head without contrast, Carotid duplex (CD).  If unable to get MR imaging, please consider CTA head and neck in 24hours (no need for CD in this case).  If the patient is unable to get MR and unable to get IV contrast please repeat the CTH in 24hours and get a CD.  3.             TTE  4.             Please check HbA1C and fasting lipid profile  5.             Start ASA 81mg (ASA 325mg rectally until able to swallow) and Lipitor 40mg HS  6.             permissive HTN of SBP <200 today/<180 tomorrow <160 day after   7.             Frequent neurochecks  8.          NPO for now  9.          Formal speech and swallow evaluation  10.          PT evaluation  11.          STAT CTH IF the patient has sudden change in mental status or neurological exam  12.          DVT PPx    Thank you for the courtesy of this consult.

## 2018-06-19 NOTE — PROGRESS NOTE ADULT - SUBJECTIVE AND OBJECTIVE BOX
CHIEF COMPLAINT:Patient is a 89y old  Male who presents with a chief complaint of weakness. aphasia (12 Jun 2018 10:18)    	  REVIEW OF SYSTEMS:  CONSTITUTIONAL: No fever, weight loss, or fatigue  EYES: No eye pain, visual disturbances, or discharge  ENMT:  No difficulty hearing, tinnitus, vertigo; No sinus or throat pain  NECK: No pain or stiffness  RESPIRATORY: No cough, wheezing, chills or hemoptysis; No Shortness of Breath  CARDIOVASCULAR: No chest pain, palpitations, passing out, dizziness, or leg swelling  GASTROINTESTINAL: No abdominal or epigastric pain. No nausea, vomiting, or hematemesis; No diarrhea or constipation. No melena or hematochezia.  GENITOURINARY: No dysuria, frequency, hematuria, or incontinence  NEUROLOGICAL: No headaches, memory loss, loss of strength, numbness, or tremors  SKIN: No itching, burning, rashes, or lesions   LYMPH Nodes: No enlarged glands  ENDOCRINE: No heat or cold intolerance; No hair loss  MUSCULOSKELETAL: No joint pain or swelling; No muscle, back, or extremity pain  PSYCHIATRIC: No depression, anxiety, mood swings, or difficulty sleeping  HEME/LYMPH: No easy bruising, or bleeding gums  ALLERY AND IMMUNOLOGIC: No hives or eczema	    [ ] All others negative	  [ ] Unable to obtain    PHYSICAL EXAM:  T(C): 36.8 (06-19-18 @ 11:00), Max: 37.1 (06-18-18 @ 23:58)  HR: 77 (06-19-18 @ 11:00) (45 - 82)  BP: 119/39 (06-19-18 @ 11:00) (112/43 - 158/62)  RR: 18 (06-19-18 @ 11:00) (16 - 18)  SpO2: 98% (06-19-18 @ 11:00) (90% - 98%)  Wt(kg): --  I&O's Summary      Appearance: Normal	  HEENT:   Normal oral mucosa, PERRL, EOMI	  Lymphatic: No lymphadenopathy  Cardiovascular: Normal S1 S2, No JVD, No murmurs, No edema  Respiratory: Lungs clear to auscultation	  Psychiatry: A & O x 3, Mood & affect appropriate  Gastrointestinal:  Soft, Non-tender, + BS	  Skin: No rashes, No ecchymoses, No cyanosis	  Neurologic: Non-focal  Extremities: Normal range of motion, No clubbing, cyanosis or edema  Vascular: Peripheral pulses palpable 2+ bilaterally    MEDICATIONS  (STANDING):  aspirin  chewable 81 milliGRAM(s) Oral daily  atorvastatin 40 milliGRAM(s) Oral at bedtime  docusate sodium 100 milliGRAM(s) Oral two times a day  furosemide    Tablet 20 milliGRAM(s) Oral daily  heparin  Injectable 5000 Unit(s) SubCutaneous every 8 hours  levothyroxine 100 MICROGram(s) Oral daily  piperacillin/tazobactam IVPB. 3.375 Gram(s) IV Intermittent every 8 hours  senna 2 Tablet(s) Oral at bedtime  tamsulosin 0.4 milliGRAM(s) Oral at bedtime      TELEMETRY: 	    ECG:  	  RADIOLOGY:  OTHER: 	  	  CBC Full  -  ( 19 Jun 2018 07:21 )  WBC Count : 13.0 K/uL  Hemoglobin : 8.7 g/dL  Hematocrit : 27.6 %  Platelet Count - Automated : 187 K/uL  Mean Cell Volume : 111.8 fl  Mean Cell Hemoglobin : 35.2 pg  Mean Cell Hemoglobin Concentration : 31.5 gm/dL  Auto Neutrophil # : x  Auto Lymphocyte # : x  Auto Monocyte # : x  Auto Eosinophil # : x  Auto Basophil # : x  Auto Neutrophil % : x  Auto Lymphocyte % : x  Auto Monocyte % : x  Auto Eosinophil % : x  Auto Basophil % : x        CARDIAC MARKERS:                              8.7    13.0  )-----------( 187      ( 19 Jun 2018 07:21 )             27.6       06-19    145  |  111<H>  |  47<H>  ----------------------------<  138<H>  3.6   |  24  |  0.96    Ca    8.7      19 Jun 2018 07:21  Phos  2.8     06-18  Mg     2.0     06-18        PT/INR - ( 19 Jun 2018 07:21 )   PT: 14.0 sec;   INR: 1.28 ratio               proBNP: Serum Pro-Brain Natriuretic Peptide: 7688 pg/mL (06-16 @ 07:37)    Lipid Profile: Cholesterol 105  LDL 47  HDL 43  TG 76  Cholesterol 118  LDL 55  HDL 48  TG 76    HgA1c: Hemoglobin A1C, Whole Blood: 5.2 % (06-13 @ 18:32)    TSH: Thyroid Stimulating Hormone, Serum: 1.76 uU/mL (06-13 @ 12:14)

## 2018-06-19 NOTE — PROGRESS NOTE ADULT - SUBJECTIVE AND OBJECTIVE BOX
Subjective: lying in bed more awake and alert nad . is [place] , no diarrhea, occ cough with no phlegm . no fevers . ct chest /abd and pelvis with no consolidation , only aortic aneurysm as well as of common iliacs /bilateral effusions with atelectasis       PHYSICAL EXAM:    Vital Signs Last 24 Hrs  T(C): 36.8 (19 Jun 2018 11:00), Max: 37.1 (18 Jun 2018 23:58)  T(F): 98.2 (19 Jun 2018 11:00), Max: 98.7 (18 Jun 2018 23:58)  HR: 77 (19 Jun 2018 11:00) (45 - 77)  BP: 119/39 (19 Jun 2018 11:00) (112/43 - 134/60)  BP(mean): --  RR: 18 (19 Jun 2018 11:00) (17 - 18)  SpO2: 98% (19 Jun 2018 11:00) (90% - 98%)    Constitutional: awake confused nad    COURTNEY SCLERA anicteric EOMI   LUNGS bibasilar few rales   CVS s1 s2 aud no murmurs   ABDOMEN soft non tender   NEUROLOGY  no defecits   SKIN no rash   EXTREMITIES no edema no cyanosis         LABS/DIAGNOSTIC TESTS                        8.7    13.0  )-----------( 187      ( 19 Jun 2018 07:21 )             27.6     06-19    145  |  111<H>  |  47<H>  ----------------------------<  138<H>  3.6   |  24  |  0.96    Ca    8.7      19 Jun 2018 07:21  Phos  2.8     06-18  Mg     2.0     06-18      PT/INR - ( 19 Jun 2018 07:21 )   PT: 14.0 sec;   INR: 1.28 ratio               meds   acetaminophen  Suppository 650 milliGRAM(s) Rectal every 6 hours PRN  aspirin  chewable 81 milliGRAM(s) Oral daily  atorvastatin 40 milliGRAM(s) Oral at bedtime  docusate sodium 100 milliGRAM(s) Oral two times a day  furosemide    Tablet 20 milliGRAM(s) Oral daily  heparin  Injectable 5000 Unit(s) SubCutaneous every 8 hours  levothyroxine 100 MICROGram(s) Oral daily  melatonin 3 milliGRAM(s) Oral at bedtime PRN  piperacillin/tazobactam IVPB. 3.375 Gram(s) IV Intermittent every 8 hours  risperiDONE   Tablet 0.25 milliGRAM(s) Oral every 6 hours PRN  senna 2 Tablet(s) Oral at bedtime  tamsulosin 0.4 milliGRAM(s) Oral at bedtime        CULTURES  Culture Results: blood  No growth to date. (06-15 @ 09:37)  Culture Results: blood  No growth to date. (06-15 @ 09:37)  Culture Results: urine   No growth (06-12 @ 23:37)    Rapid Respiratory Viral Panel (06.15.18 @ 20:46)    Rapid RVP Result: NotDetec: The Film[employer] RVP Rapid uses polymerase chain reaction (PCR) and melt  curve analysis to screen for adenovirus; coronavirus HKU1, NL63, 229E,  OC43; human metapneumovirus (hMPV); human enterovirus/rhinovirus  (Entero/RV); influenza A; influenza A/H1;influenza A/H3; influenza  A/H1-2009; influenza B; parainfluenza viruses 1, 2, 3, 4; respiratory  syncytial virus; Bordetella pertussis; Mycoplasma pneumoniae; and  Chlamydophila pneumoniae.        RADIOLOGY  < from: US Duplex Venous Lower Ext Complete, Bilateral (06.18.18 @ 18:19) >  EXAM:  US DPLX LWR EXT VEINS COMPL BI                            PROCEDURE DATE:  06/18/2018          INTERPRETATION:  EXAM: US DPLX LWR EXT VEINS COMPL BI   INDICATION: R65.10 SIRS. fever r/o DVT.  COMPARISON: None.    TECHNIQUE: Multiple static images from duplex sonography of the deep   veins of the bilateral lower extremities utilizing color and spectral   Doppler interrogation, without and with compression.    FINDINGS:  The bilateral common femoral, superficial femoral, popliteal and   visualized calf veins are normally compressible and demonstrate normal   spontaneous and phasic flow and/or augmentation. There is no evidence of   deep vein thrombosis.    IMPRESSION:  No evidence of femoropopliteal deep vein thrombosis in either lower  extremity.                NIMISHA ALVARADO M.D., ATTENDING RADIOLOGIST    < end of copied text >      < from: CT Abdomen and Pelvis No Cont (06.16.18 @ 10:26) >  EXAM:  CT ABDOMEN AND PELVIS                          EXAM:  CT CHEST                            PROCEDURE DATE:  06/16/2018          INTERPRETATION:  CT CHEST/ABDOMEN/PELVIS:     CLINICAL INFORMATION: Fever    COMPARISON: Chest x-ray of the previous day.    PROCEDURE:  Using multislice helical CT, 2.5 mm sections were obtained   from the thoracic inlet through the ischial tuberosities without the   administration of intravenous contrast. Oral contrast was not   administered.    Coronal and sagittal reformations were performed by  CT technologist and   made available for review.    FINDINGS:  The visualized structures of the lower neck are unremarkable.   The visualized aorta is of normal course and caliber. The heart is not   enlarged. There is no evidence of pericardial effusion.    There is no evidence of axillary, hilar, or mediastinal lymphadenopathy.   There is coronary artery calcification and atherosclerotic calcification   of the aorta.    No focal lung consolidations identified. No evidence of pneumothorax. No   pulmonary nodules identified. There are small bilateral pleural   effusions. There is passive atelectasis at the lung bases. There is   pulmonary vascular.      The liver is of normal contour. No evidence of focal hepatic lesion on   this nonenhanced examination. The gallbladder is present. No evidence of   intra or extrahepatic biliary dilatation.     The pancreas, spleen, and adrenal glands are grossly unremarkable. There   is a large nonobstructive stone in the midpole of the left kidney. There   is no evidence of hydronephrosis or perinephric stranding. No evidence of    nephrolithiasis.The bladder is unremarkable.    No evidence of lymphadenopathy utilizing CT size criteria. There is   aneurysmal dilatation of the infrarenal abdominal aorta with peripheral   calcification. The aneurysm measures up to 6.4 cm. It terminates above   the bifurcation. 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   There are bilateral inguinal hernias with fat. There are also   nonobstructed small bowel loops within the right inguinal hernia..    There is no evidence of bowel obstruction. There is no evidence of   pneumoperitoneum or free fluid.    The visualized osseous structures demonstrate osteopenia and degenerative   changes. There is moderate compression fracture of L2 which is   indeterminate in age. There is severe compression fracture of T11 with   retropulsion into the spinal canal. This is also indeterminate in age..    IMPRESSION: Large infrarenal abdominal aortic aneurysm measuring up to   6.4 cm which is also noted on the plain films of lumbar spine of   9/8/2017. 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   Bilateral pleural effusions with mild bibasilar atelectasis. Mild   pulmonary vascular congestion.  Nonobstructive left renal calculus  Nonobstructing right inguinal hernia containing small bowel loops  Indeterminate age severe compression fracture T12 and moderate   compression fracture L2                HAMMAD ALVAREZ M.D.,ATTENDING RADIOLOGIST  This document has been electronically signed. Jun 16 2018 10:44AM    < end of copied text >

## 2018-06-19 NOTE — PROGRESS NOTE ADULT - ASSESSMENT
85 y/o male from home HHA 12 hours ambulates with walker pmh of BPH, arthritis hypothyroidism, thoracic aneurysm presented to the hospital with complaints of weakness, aphasia , poor responsiveness and unable to get up from toilet seat.  Admitted to ICU for concern of CVA s/p TPA monitoring and sepsis likely secondary to pna vs UTI. Managed on the floor for the above mentioned symptoms and later transferred to the Telemetry floor due episodes of Bradycardya 85 y/o male from home HHA 12 hours ambulates with walker pmh of BPH, arthritis hypothyroidism, thoracic aneurysm presented to the hospital with complaints of weakness, aphasia , poor responsiveness and unable to get up from toilet seat.  Admitted to ICU for concern of CVA s/p TPA monitoring and sepsis likely secondary to pna vs UTI. Managed on the floor for the above mentioned symptoms and later transferred to the Telemetry floor due episodes of Bradycardia

## 2018-06-19 NOTE — PROGRESS NOTE ADULT - PROBLEM SELECTOR PLAN 1
Managed initially in the ICU  s/p TPA  CT head negative  MRI with chronic infarcts  c/w asa and statin

## 2018-06-19 NOTE — PROGRESS NOTE ADULT - PROBLEM SELECTOR PLAN 6
Patient noted with HR 40's; /64  resting comfortable OOB-chair in NAD   Patient will likely need a pacemaker as per Dr. Umanzor and will speak to family for consent.  Cards (Dr. Maier) to evaluate patient  will transfer to Tele  Dr. Isaacs

## 2018-06-19 NOTE — PROGRESS NOTE ADULT - SUBJECTIVE AND OBJECTIVE BOX
==================PGY 1 Note===================   Discussed with supervising resident and primary attending    ================CHIEF COMPLAINT===============  Patient is a 89y old  Male who presents with a chief complaint of weakness. aphasia (12 Jun 2018 10:18)        =========INTERVAL HPI/OVERNIGHT EVENTS=========  Episodes of Bradycardia    ============CURRENT MEDICATIONS===============    MEDICATIONS  (STANDING):  aspirin  chewable 81 milliGRAM(s) Oral daily  atorvastatin 40 milliGRAM(s) Oral at bedtime  docusate sodium 100 milliGRAM(s) Oral two times a day  furosemide    Tablet 20 milliGRAM(s) Oral daily  heparin  Injectable 5000 Unit(s) SubCutaneous every 8 hours  levothyroxine 100 MICROGram(s) Oral daily  piperacillin/tazobactam IVPB. 3.375 Gram(s) IV Intermittent every 8 hours  senna 2 Tablet(s) Oral at bedtime  tamsulosin 0.4 milliGRAM(s) Oral at bedtime    MEDICATIONS  (PRN):  acetaminophen  Suppository 650 milliGRAM(s) Rectal every 6 hours PRN For Temp greater than 38 C (100.4 F)  melatonin 3 milliGRAM(s) Oral at bedtime PRN Insomnia  risperiDONE   Tablet 0.25 milliGRAM(s) Oral every 6 hours PRN agitation        ============REVIEW OF SYSTEMS==================    CONSTITUTIONAL: No fever  EYES: no acute visual disturbances  NECK: No pain or stiffness  RESPIRATORY: No cough; No shortness of breath  CARDIOVASCULAR: No chest pain, no palpitations  GASTROINTESTINAL: No pain. No nausea or vomiting; No diarrhea   NEUROLOGICAL: No headache or numbness, no tremors  MUSCULOSKELETAL: No joint pain, no muscle pain  GENITOURINARY: no dysuria, no frequency, no hesitancy  PSYCHIATRY: no depression , no anxiety  ALL OTHER  ROS negative      ================VITALS SIGNS=====================  Vital Signs Last 24 Hrs  T(C): 36.8 (19 Jun 2018 11:00), Max: 37.1 (18 Jun 2018 23:58)  T(F): 98.2 (19 Jun 2018 11:00), Max: 98.7 (18 Jun 2018 23:58)  HR: 77 (19 Jun 2018 11:00) (45 - 82)  BP: 119/39 (19 Jun 2018 11:00) (112/43 - 158/62)  BP(mean): --  RR: 18 (19 Jun 2018 11:00) (16 - 18)  SpO2: 98% (19 Jun 2018 11:00) (90% - 98%)    ===============PHYSICAL EXAM====================    GENERAL: NAD  HEENT: Normocephalic;  conjunctivae and sclerae clear; moist mucous membranes;   NECK : supple  CHEST/LUNG: Clear to auscultation bilaterally with good air entry   HEART: S1 S2  regular; no murmurs, gallops or rubs  ABDOMEN: Soft, Nontender, Nondistended; Bowel sounds present  EXTREMITIES: no cyanosis; no edema; no calf tenderness  SKIN: warm and dry; no rash  NERVOUS SYSTEM:  Awake and alert; Oriented  to place, person and time    ==============LABORATORIES======================  LABS:                        8.7    13.0  )-----------( 187      ( 19 Jun 2018 07:21 )             27.6     06-19    145  |  111<H>  |  47<H>  ----------------------------<  138<H>  3.6   |  24  |  0.96    Ca    8.7      19 Jun 2018 07:21  Phos  2.8     06-18  Mg     2.0     06-18      PT/INR - ( 19 Jun 2018 07:21 )   PT: 14.0 sec;   INR: 1.28 ratio             CAPILLARY BLOOD GLUCOSE          =============INPUTS/OUPUTS=====================        RADIOLOGY & ADDITIONAL TESTS:    Imaging Personally Reviewed:  YES    Consultant(s) Notes Reviewed:   YES    Care Discussed with Consultants : YES    Plan of care was discussed with patient  and /or primary care giver; all questions and concerns were addressed and care was aligned with patient's wishes. Time was allowed for questions that were answered to the best of my abilities

## 2018-06-19 NOTE — PROGRESS NOTE ADULT - ASSESSMENT
89 yo male from home HHA 12 hours ambulates with walker pmh of BPH, arthiritis hypothyroidism, thoracic aneurysm  presented to the hospital with complaints of weakness, aphasia , poor resposiveness and unable to get up from toilet seat. history obtained by family at bedside. According to his wife, he had been "shaking" for most of the night. On 6/12/18 he awoke and was able to walk to the bathroom with his walker as usual. He sat on the toilet, and then had difficulty getting off the toiletThere was diminished verbal output. wife said he used his left arm to try to get up but she felt his right side was weak. his eyes were closed and he was not responsive. she called EMS. ROS positive for increased urinary frequency and buring for past couple of days. no chest pain , SOB cough.    in the ER Stroke code called on arrival at 7:15a, tele-stroke activated at 7:20a. VSS stable  EXCEPT tmax 102.4. ON TELE stroke patient had NIH scale of 15. b/l weakness greater on right side. code sepsis was also initiaated. labs pertinent for wbc count of q12 , ekg nsr left axis. lactate of 2.4. cxr : Mild pulmonary vascular congestion. Small right basilar nodular opacity. ct head: Moderate severe chronic small vessel ischemic changes in the frontal parietal white matter and small chronic appearing right frontal cortical infarct. TPA given at 9 am , and icu consult called.  code sepsis was also initiaated. labs pertinent for wbc count of q12 , ekg nsr left axis. lactate of 2.4. cxr : Mild pulmonary vascular congestion. Small right basilar nodular opacity. started on rocephin and zmax    # no objective evidence of bacterial infection as cause of fevers on adm . all cx neg /rvp swab neg /ct chest with no consolidation . pct slightly elevated , bnp elevated     plan  d/c all ab since no clear source   f/u  blood cx   rx of chf as per pmd     thnx will f/u   d/w family 89 yo male from home HHA 12 hours ambulates with walker pmh of BPH, arthiritis hypothyroidism, thoracic aneurysm  presented to the hospital with complaints of weakness, aphasia , poor resposiveness and unable to get up from toilet seat. history obtained by family at bedside. According to his wife, he had been "shaking" for most of the night. On 6/12/18 he awoke and was able to walk to the bathroom with his walker as usual. He sat on the toilet, and then had difficulty getting off the toiletThere was diminished verbal output. wife said he used his left arm to try to get up but she felt his right side was weak. his eyes were closed and he was not responsive. she called EMS. ROS positive for increased urinary frequency and buring for past couple of days. no chest pain , SOB cough.    in the ER Stroke code called on arrival at 7:15a, tele-stroke activated at 7:20a. VSS stable  EXCEPT tmax 102.4. ON TELE stroke patient had NIH scale of 15. b/l weakness greater on right side. code sepsis was also initiaated. labs pertinent for wbc count of q12 , ekg nsr left axis. lactate of 2.4. cxr : Mild pulmonary vascular congestion. Small right basilar nodular opacity. ct head: Moderate severe chronic small vessel ischemic changes in the frontal parietal white matter and small chronic appearing right frontal cortical infarct. TPA given at 9 am , and icu consult called.  code sepsis was also initiaated. labs pertinent for wbc count of q12 , ekg nsr left axis. lactate of 2.4. cxr : Mild pulmonary vascular congestion. Small right basilar nodular opacity. started on rocephin and zmax    # no objective evidence of bacterial infection as cause of fevers on adm . all cx neg /rvp swab neg /ct chest with no consolidation . pct slightly elevated , bnp elevated     plan  d/c all ab since no clear source   f/u  blood cx   rx of chf as per pmd   for PPM as per cardio    thnx will f/u   d/w family

## 2018-06-19 NOTE — PROGRESS NOTE ADULT - PROBLEM SELECTOR PLAN 3
Noted drop in H/H  s/p TPA  monitor CBC  occult stool - negative Noted drop in H/H  Stable Hemoglobin  s/p TPA  monitor CBC daily  occult stool - negative Large infrarenal abdominal aortic aneurysm measuring up to   6.4 cm which is also noted on the plain films of lumbar spine of   9/8/2017  Vascular Surgery Dr. Bolanos Consulted

## 2018-06-19 NOTE — PROGRESS NOTE ADULT - PROBLEM SELECTOR PLAN 6
Patient noted with HR 40's; /64  resting comfortable OOB-chair in NAD   EKG showing afib with slow V response  Cards (Dr. Maier) to evaluate patient  will transfer to Tele  Dr. Isaacs Patient noted with HR 40's  Resting comfortable OOB-chair in NAD  Will need Pacemaker due to Tachy Froylan  Cardiology (Dr. Umanzor) c/w home med Flomax as tolerated.

## 2018-06-20 DIAGNOSIS — F05 DELIRIUM DUE TO KNOWN PHYSIOLOGICAL CONDITION: ICD-10-CM

## 2018-06-20 LAB
ANION GAP SERPL CALC-SCNC: 7 MMOL/L — SIGNIFICANT CHANGE UP (ref 5–17)
APTT BLD: 29.4 SEC — SIGNIFICANT CHANGE UP (ref 27.5–37.4)
BASOPHILS # BLD AUTO: 0.1 K/UL — SIGNIFICANT CHANGE UP (ref 0–0.2)
BASOPHILS NFR BLD AUTO: 0.5 % — SIGNIFICANT CHANGE UP (ref 0–2)
BUN SERPL-MCNC: 51 MG/DL — HIGH (ref 7–18)
CALCIUM SERPL-MCNC: 8.7 MG/DL — SIGNIFICANT CHANGE UP (ref 8.4–10.5)
CHLORIDE SERPL-SCNC: 111 MMOL/L — HIGH (ref 96–108)
CO2 SERPL-SCNC: 29 MMOL/L — SIGNIFICANT CHANGE UP (ref 22–31)
CREAT SERPL-MCNC: 0.95 MG/DL — SIGNIFICANT CHANGE UP (ref 0.5–1.3)
CULTURE RESULTS: SIGNIFICANT CHANGE UP
CULTURE RESULTS: SIGNIFICANT CHANGE UP
EOSINOPHIL # BLD AUTO: 0 K/UL — SIGNIFICANT CHANGE UP (ref 0–0.5)
EOSINOPHIL NFR BLD AUTO: 0.1 % — SIGNIFICANT CHANGE UP (ref 0–6)
GLUCOSE SERPL-MCNC: 136 MG/DL — HIGH (ref 70–99)
HCT VFR BLD CALC: 29.7 % — LOW (ref 39–50)
HGB BLD-MCNC: 8.7 G/DL — LOW (ref 13–17)
INR BLD: 1.27 RATIO — HIGH (ref 0.88–1.16)
LYMPHOCYTES # BLD AUTO: 1.5 K/UL — SIGNIFICANT CHANGE UP (ref 1–3.3)
LYMPHOCYTES # BLD AUTO: 12.2 % — LOW (ref 13–44)
MCHC RBC-ENTMCNC: 29.3 GM/DL — LOW (ref 32–36)
MCHC RBC-ENTMCNC: 32.8 PG — SIGNIFICANT CHANGE UP (ref 27–34)
MCV RBC AUTO: 112 FL — HIGH (ref 80–100)
MONOCYTES # BLD AUTO: 0.8 K/UL — SIGNIFICANT CHANGE UP (ref 0–0.9)
MONOCYTES NFR BLD AUTO: 6.6 % — SIGNIFICANT CHANGE UP (ref 2–14)
NEUTROPHILS # BLD AUTO: 9.9 K/UL — HIGH (ref 1.8–7.4)
NEUTROPHILS NFR BLD AUTO: 80.6 % — HIGH (ref 43–77)
PLATELET # BLD AUTO: 217 K/UL — SIGNIFICANT CHANGE UP (ref 150–400)
POTASSIUM SERPL-MCNC: 3.2 MMOL/L — LOW (ref 3.5–5.3)
POTASSIUM SERPL-SCNC: 3.2 MMOL/L — LOW (ref 3.5–5.3)
PROTHROM AB SERPL-ACNC: 13.9 SEC — HIGH (ref 9.8–12.7)
RBC # BLD: 2.65 M/UL — LOW (ref 4.2–5.8)
RBC # FLD: 19.4 % — HIGH (ref 10.3–14.5)
SODIUM SERPL-SCNC: 147 MMOL/L — HIGH (ref 135–145)
SPECIMEN SOURCE: SIGNIFICANT CHANGE UP
SPECIMEN SOURCE: SIGNIFICANT CHANGE UP
WBC # BLD: 12.2 K/UL — HIGH (ref 3.8–10.5)
WBC # FLD AUTO: 12.2 K/UL — HIGH (ref 3.8–10.5)

## 2018-06-20 PROCEDURE — 71045 X-RAY EXAM CHEST 1 VIEW: CPT | Mod: 26

## 2018-06-20 RX ORDER — HALOPERIDOL DECANOATE 100 MG/ML
1 INJECTION INTRAMUSCULAR EVERY 6 HOURS
Qty: 0 | Refills: 0 | Status: DISCONTINUED | OUTPATIENT
Start: 2018-06-20 | End: 2018-06-20

## 2018-06-20 RX ORDER — HALOPERIDOL DECANOATE 100 MG/ML
2 INJECTION INTRAMUSCULAR ONCE
Qty: 0 | Refills: 0 | Status: COMPLETED | OUTPATIENT
Start: 2018-06-20 | End: 2018-06-20

## 2018-06-20 RX ORDER — POTASSIUM CHLORIDE 20 MEQ
10 PACKET (EA) ORAL
Qty: 0 | Refills: 0 | Status: COMPLETED | OUTPATIENT
Start: 2018-06-20 | End: 2018-06-20

## 2018-06-20 RX ORDER — LANOLIN ALCOHOL/MO/W.PET/CERES
3 CREAM (GRAM) TOPICAL AT BEDTIME
Qty: 0 | Refills: 0 | Status: DISCONTINUED | OUTPATIENT
Start: 2018-06-20 | End: 2018-06-22

## 2018-06-20 RX ORDER — RISPERIDONE 4 MG/1
0.5 TABLET ORAL
Qty: 0 | Refills: 0 | Status: DISCONTINUED | OUTPATIENT
Start: 2018-06-20 | End: 2018-06-20

## 2018-06-20 RX ORDER — FUROSEMIDE 40 MG
20 TABLET ORAL DAILY
Qty: 0 | Refills: 0 | Status: DISCONTINUED | OUTPATIENT
Start: 2018-06-20 | End: 2018-06-22

## 2018-06-20 RX ORDER — SODIUM CHLORIDE 9 MG/ML
1000 INJECTION, SOLUTION INTRAVENOUS
Qty: 0 | Refills: 0 | Status: DISCONTINUED | OUTPATIENT
Start: 2018-06-20 | End: 2018-06-20

## 2018-06-20 RX ORDER — HALOPERIDOL DECANOATE 100 MG/ML
1 INJECTION INTRAMUSCULAR EVERY 6 HOURS
Qty: 0 | Refills: 0 | Status: DISCONTINUED | OUTPATIENT
Start: 2018-06-20 | End: 2018-06-22

## 2018-06-20 RX ORDER — POTASSIUM CHLORIDE 20 MEQ
40 PACKET (EA) ORAL ONCE
Qty: 0 | Refills: 0 | Status: DISCONTINUED | OUTPATIENT
Start: 2018-06-20 | End: 2018-06-20

## 2018-06-20 RX ORDER — TAMSULOSIN HYDROCHLORIDE 0.4 MG/1
0.4 CAPSULE ORAL AT BEDTIME
Qty: 0 | Refills: 0 | Status: DISCONTINUED | OUTPATIENT
Start: 2018-06-20 | End: 2018-06-24

## 2018-06-20 RX ORDER — ASPIRIN/CALCIUM CARB/MAGNESIUM 324 MG
81 TABLET ORAL DAILY
Qty: 0 | Refills: 0 | Status: DISCONTINUED | OUTPATIENT
Start: 2018-06-20 | End: 2018-06-24

## 2018-06-20 RX ORDER — ATORVASTATIN CALCIUM 80 MG/1
40 TABLET, FILM COATED ORAL AT BEDTIME
Qty: 0 | Refills: 0 | Status: DISCONTINUED | OUTPATIENT
Start: 2018-06-20 | End: 2018-06-24

## 2018-06-20 RX ORDER — SENNA PLUS 8.6 MG/1
2 TABLET ORAL AT BEDTIME
Qty: 0 | Refills: 0 | Status: DISCONTINUED | OUTPATIENT
Start: 2018-06-20 | End: 2018-06-24

## 2018-06-20 RX ORDER — HEPARIN SODIUM 5000 [USP'U]/ML
5000 INJECTION INTRAVENOUS; SUBCUTANEOUS EVERY 8 HOURS
Qty: 0 | Refills: 0 | Status: DISCONTINUED | OUTPATIENT
Start: 2018-06-20 | End: 2018-06-22

## 2018-06-20 RX ORDER — DOCUSATE SODIUM 100 MG
100 CAPSULE ORAL
Qty: 0 | Refills: 0 | Status: DISCONTINUED | OUTPATIENT
Start: 2018-06-20 | End: 2018-06-24

## 2018-06-20 RX ORDER — LEVOTHYROXINE SODIUM 125 MCG
100 TABLET ORAL DAILY
Qty: 0 | Refills: 0 | Status: DISCONTINUED | OUTPATIENT
Start: 2018-06-20 | End: 2018-06-24

## 2018-06-20 RX ADMIN — HALOPERIDOL DECANOATE 1 MILLIGRAM(S): 100 INJECTION INTRAMUSCULAR at 14:52

## 2018-06-20 RX ADMIN — Medication 20 MILLIGRAM(S): at 06:15

## 2018-06-20 RX ADMIN — SENNA PLUS 2 TABLET(S): 8.6 TABLET ORAL at 22:32

## 2018-06-20 RX ADMIN — SODIUM CHLORIDE 50 MILLILITER(S): 9 INJECTION, SOLUTION INTRAVENOUS at 10:53

## 2018-06-20 RX ADMIN — Medication 100 MILLIEQUIVALENT(S): at 12:00

## 2018-06-20 RX ADMIN — ATORVASTATIN CALCIUM 40 MILLIGRAM(S): 80 TABLET, FILM COATED ORAL at 22:32

## 2018-06-20 RX ADMIN — Medication 100 MILLIEQUIVALENT(S): at 10:51

## 2018-06-20 RX ADMIN — TAMSULOSIN HYDROCHLORIDE 0.4 MILLIGRAM(S): 0.4 CAPSULE ORAL at 22:32

## 2018-06-20 RX ADMIN — Medication 3 MILLIGRAM(S): at 22:32

## 2018-06-20 RX ADMIN — HALOPERIDOL DECANOATE 1 MILLIGRAM(S): 100 INJECTION INTRAMUSCULAR at 22:33

## 2018-06-20 RX ADMIN — Medication 100 MICROGRAM(S): at 06:15

## 2018-06-20 RX ADMIN — HEPARIN SODIUM 5000 UNIT(S): 5000 INJECTION INTRAVENOUS; SUBCUTANEOUS at 22:33

## 2018-06-20 RX ADMIN — HALOPERIDOL DECANOATE 2 MILLIGRAM(S): 100 INJECTION INTRAMUSCULAR at 12:01

## 2018-06-20 NOTE — PROGRESS NOTE ADULT - SUBJECTIVE AND OBJECTIVE BOX
CHIEF COMPLAINT:Patient is a 89y old  Male who presents with a chief complaint of weakness. aphasia (12 Jun 2018 10:18)    	  REVIEW OF SYSTEMS:  CONSTITUTIONAL: No fever, weight loss, or fatigue  EYES: No eye pain, visual disturbances, or discharge  ENT:  No difficulty hearing, tinnitus, vertigo; No sinus or throat pain  NECK: No pain or stiffness  RESPIRATORY: No cough, wheezing, chills or hemoptysis; No Shortness of Breath  CARDIOVASCULAR: No chest pain, palpitations, passing out, dizziness, or leg swelling  GASTROINTESTINAL: No abdominal or epigastric pain. No nausea, vomiting, or hematemesis; No diarrhea or constipation. No melena or hematochezia.  GENITOURINARY: No dysuria, frequency, hematuria, or incontinence  NEUROLOGICAL: No headaches, memory loss, loss of strength, numbness, or tremors  SKIN: No itching, burning, rashes, or lesions   LYMPH Nodes: No enlarged glands  ENDOCRINE: No heat or cold intolerance; No hair loss  MUSCULOSKELETAL: No joint pain or swelling; No muscle, back, or extremity pain  PSYCHIATRIC: No depression, anxiety, mood swings, or difficulty sleeping  HEME/LYMPH: No easy bruising, or bleeding gums  ALLERGY AND IMMUNOLOGIC: No hives or eczema	      PHYSICAL EXAM:  T(C): 36.5 (06-20-18 @ 07:45), Max: 36.8 (06-19-18 @ 11:00)  HR: 48 (06-20-18 @ 07:45) (45 - 77)  BP: 127/45 (06-20-18 @ 07:45) (119/39 - 161/53)  RR: 19 (06-20-18 @ 07:45) (18 - 20)  SpO2: 95% (06-20-18 @ 07:45) (92% - 98%)    I&O's Summary    19 Jun 2018 07:01  -  20 Jun 2018 07:00  --------------------------------------------------------  IN: 100 mL / OUT: 0 mL / NET: 100 mL        Appearance: Normal	  HEENT:   Normal oral mucosa, PERRL, EOMI	  Lymphatic: No lymphadenopathy  Cardiovascular: Normal S1 S2, No JVD, No murmurs, No edema  Respiratory: Lungs clear to auscultation	  Psychiatry: A & O x 3, Mood & affect appropriate  Gastrointestinal:  Soft, Non-tender, + BS	  Skin: No rashes, No ecchymoses, No cyanosis	  Neurologic: Non-focal  Extremities: Normal range of motion, No clubbing, cyanosis or edema  Vascular: Peripheral pulses palpable 2+ bilaterally    MEDICATIONS  (STANDING):  aspirin  chewable 81 milliGRAM(s) Oral daily  atorvastatin 40 milliGRAM(s) Oral at bedtime  dextrose 5% + sodium chloride 0.9%. 1000 milliLiter(s) (50 mL/Hr) IV Continuous <Continuous>  docusate sodium 100 milliGRAM(s) Oral two times a day  furosemide    Tablet 20 milliGRAM(s) Oral daily  heparin  Injectable 5000 Unit(s) SubCutaneous every 8 hours  levothyroxine 100 MICROGram(s) Oral daily  potassium chloride  10 mEq/100 mL IVPB 10 milliEquivalent(s) IV Intermittent every 1 hour  senna 2 Tablet(s) Oral at bedtime  tamsulosin 0.4 milliGRAM(s) Oral at bedtime      TELEMETRY: 	sr, bradycardia with  ventricular escape, NSVT  ECG:  	  RADIOLOGY:  OTHER: 	  	  LABS:	 	    CARDIAC MARKERS:                                8.7    12.2  )-----------( 217      ( 20 Jun 2018 08:00 )             29.7     06-20    147<H>  |  111<H>  |  51<H>  ----------------------------<  136<H>  3.2<L>   |  29  |  0.95    Ca    8.7      20 Jun 2018 08:01  Phos  2.8     06-18  Mg     2.0     06-18      proBNP: Serum Pro-Brain Natriuretic Peptide: 7688 pg/mL (06-16 @ 07:37)    Lipid Profile: Cholesterol 105  LDL 47  HDL 43  TG 76  Cholesterol 118  LDL 55  HDL 48  TG 76    HgA1c: Hemoglobin A1C, Whole Blood: 5.2 % (06-13 @ 18:32)    TSH: Thyroid Stimulating Hormone, Serum: 1.76 uU/mL (06-13 @ 12:14)

## 2018-06-20 NOTE — BEHAVIORAL HEALTH ASSESSMENT NOTE - SUMMARY
This is a 89 y/o  w/m was admitted with CVA and sepsis.  Patient was evaluated.Chart was reviewed.Case was discussed with MD.  Patients daughter was seen.  Patient appeared confused,restless,minimally verbal,behaviorally unpredictable.  He is unable to provide his PMH or reason for current hospitalization.  Speech is low pitched with long pauses,goal directed,illogical at times.  Denied a/v hallucinations.Denied s/h thought.memory and cognition-limited.I&J-limited.

## 2018-06-20 NOTE — PROGRESS NOTE ADULT - ATTENDING COMMENTS
Patient is seen and examined. Case reviewed with the medical team. Above note is appreciated. Will follow up clinically. Continue DVT prophylaxis. Case discussed with Cardiology, and vascular surgery. Patient for ppm today. Patient however not a candidate for any invasive vascular procedures. The family also refuses any invasive procedures. and understands the risks.

## 2018-06-20 NOTE — PROGRESS NOTE ADULT - SUBJECTIVE AND OBJECTIVE BOX
==================PGY 1 Note===================   Discussed with supervising resident and primary attending    ================CHIEF COMPLAINT===============  Patient is a 89y old  Male who presents with a chief complaint of weakness. aphasia (12 Jun 2018 10:18)        =========INTERVAL HPI/OVERNIGHT EVENTS=========  Needed restraints for desorientation      ============CURRENT MEDICATIONS===============    MEDICATIONS  (STANDING):  aspirin  chewable 81 milliGRAM(s) Oral daily  atorvastatin 40 milliGRAM(s) Oral at bedtime  docusate sodium 100 milliGRAM(s) Oral two times a day  furosemide    Tablet 20 milliGRAM(s) Oral daily  heparin  Injectable 5000 Unit(s) SubCutaneous every 8 hours  levothyroxine 100 MICROGram(s) Oral daily  senna 2 Tablet(s) Oral at bedtime  tamsulosin 0.4 milliGRAM(s) Oral at bedtime    MEDICATIONS  (PRN):  acetaminophen  Suppository 650 milliGRAM(s) Rectal every 6 hours PRN For Temp greater than 38 C (100.4 F)  melatonin 3 milliGRAM(s) Oral at bedtime PRN Insomnia  risperiDONE   Tablet 0.25 milliGRAM(s) Oral every 6 hours PRN agitation        ============REVIEW OF SYSTEMS==================    CONSTITUTIONAL: No fever  EYES: no acute visual disturbances  NECK: No pain or stiffness  RESPIRATORY: No cough; No shortness of breath  CARDIOVASCULAR: No chest pain, no palpitations  GASTROINTESTINAL: No pain. No nausea or vomiting; No diarrhea   NEUROLOGICAL: No headache or numbness, no tremors  MUSCULOSKELETAL: No joint pain, no muscle pain  GENITOURINARY: no dysuria, no frequency, no hesitancy  PSYCHIATRY: no depression , no anxiety  ALL OTHER  ROS negative      ================VITALS SIGNS=====================  Vital Signs Last 24 Hrs  T(C): 36.5 (20 Jun 2018 07:45), Max: 36.8 (19 Jun 2018 08:27)  T(F): 97.7 (20 Jun 2018 07:45), Max: 98.3 (19 Jun 2018 08:27)  HR: 48 (20 Jun 2018 07:45) (45 - 77)  BP: 127/45 (20 Jun 2018 07:45) (119/39 - 161/53)  BP(mean): --  RR: 19 (20 Jun 2018 07:45) (18 - 20)  SpO2: 95% (20 Jun 2018 07:45) (92% - 98%)    ===============PHYSICAL EXAM====================    GENERAL: NAD  HEENT: Normocephalic;  conjunctivae and sclerae clear; moist mucous membranes;   NECK : supple  CHEST/LUNG: Clear to auscultation bilaterally with good air entry   HEART: S1 S2  regular; no murmurs, gallops or rubs  ABDOMEN: Soft, Nontender, Nondistended; Bowel sounds present  EXTREMITIES: no cyanosis; no edema; no calf tenderness  SKIN: warm and dry; no rash  NERVOUS SYSTEM:  Awake and alert; Oriented  to place, person and time    ==============LABORATORIES======================  LABS:                        8.7    13.0  )-----------( 187      ( 19 Jun 2018 07:21 )             27.6     06-19    145  |  111<H>  |  47<H>  ----------------------------<  138<H>  3.6   |  24  |  0.96    Ca    8.7      19 Jun 2018 07:21  Phos  2.8     06-18  Mg     2.0     06-18      PT/INR - ( 19 Jun 2018 07:21 )   PT: 14.0 sec;   INR: 1.28 ratio             CAPILLARY BLOOD GLUCOSE          =============INPUTS/OUPUTS=====================    06-19-18 @ 07:01  -  06-20-18 @ 07:00  --------------------------------------------------------  IN: 100 mL / OUT: 0 mL / NET: 100 mL          RADIOLOGY & ADDITIONAL TESTS:    Imaging Personally Reviewed:  YES    Consultant(s) Notes Reviewed:   YES    Care Discussed with Consultants : YES    Plan of care was discussed with patient  and /or primary care giver; all questions and concerns were addressed and care was aligned with patient's wishes. Time was allowed for questions that were answered to the best of my abilities

## 2018-06-20 NOTE — PROGRESS NOTE ADULT - PROBLEM SELECTOR PLAN 1
Managed initially in the ICU  s/p TPA  CT head negative  MRI with chronic infarcts  Will need to continue asa and statin

## 2018-06-20 NOTE — PROGRESS NOTE ADULT - PROBLEM SELECTOR PLAN 7
Patient noted with HR 40's  Resting comfortable OOB-chair in NAD  Will need Pacemaker due to Tachy Froylan  Cardiology (Dr. Umanzor) Patient noted with HR 40's  Resting comfortable OOB-chair in NAD  Will need Pacemaker due to Tachy Froylan  Family agreeable for Pacemaker  Cardiology (Dr. Umanzor)

## 2018-06-20 NOTE — PROGRESS NOTE ADULT - PROBLEM SELECTOR PLAN 3
Large infrarenal abdominal aortic aneurysm measuring up to   6.4 cm which is also noted on the plain films of lumbar spine of   9/8/2017  Vascular Surgery Dr. Bolanos Consulted Large infrarenal abdominal aortic aneurysm measuring up to   6.4 cm which is also noted on the plain films of lumbar spine of 9/8/2017  Vascular Surgery Dr. Bolanos  - Previously made decision to avoid AAA surgery   - Cont med mgmt  - Reassess candidacy for AAA intervention (likely candidate for EVAR).

## 2018-06-20 NOTE — PROGRESS NOTE ADULT - ASSESSMENT
89 yo male from home HHA 12 hours ambulates with walker pmh of BPH, arthiritis hypothyroidism, thoracic aneurysm  presented to the hospital with complaints of weakness, aphasia , poor resposiveness and unable to get up from toilet seat. history obtained by family at bedside. According to his wife, he had been "shaking" for most of the night. On 6/12/18 he awoke and was able to walk to the bathroom with his walker as usual. He sat on the toilet, and then had difficulty getting off the toiletThere was diminished verbal output. wife said he used his left arm to try to get up but she felt his right side was weak. his eyes were closed and he was not responsive. she called EMS. ROS positive for increased urinary frequency and buring for past couple of days. no chest pain , SOB cough.    in the ER Stroke code called on arrival at 7:15a, tele-stroke activated at 7:20a. VSS stable  EXCEPT tmax 102.4. ON TELE stroke patient had NIH scale of 15. b/l weakness greater on right side. code sepsis was also initiaated. labs pertinent for wbc count of q12 , ekg nsr left axis. lactate of 2.4. cxr : Mild pulmonary vascular congestion. Small right basilar nodular opacity. ct head: Moderate severe chronic small vessel ischemic changes in the frontal parietal white matter and small chronic appearing right frontal cortical infarct. TPA given at 9 am , and icu consult called.  code sepsis was also initiaated. labs pertinent for wbc count of q12 , ekg nsr left axis. lactate of 2.4. cxr : Mild pulmonary vascular congestion. Small right basilar nodular opacity. started on rocephin and zmax    # no objective evidence of bacterial infection as cause of fevers on adm . all cx neg /rvp swab neg /ct chest with no consolidation . pct slightly elevated , bnp elevated     # mild elevation of wbc ? reactive sec to ? urinary retention     plan  maintain off ab   bladder scan , if more than or equal to 200cc will need dee   rx of chf as per pmd   for PPM as per cardio  RPT CXR D/W INTERN    thnx will f/u   d/w family

## 2018-06-20 NOTE — PROGRESS NOTE ADULT - ASSESSMENT
87 y/o male from home HHA 12 hours ambulates with walker pmh of BPH, arthritis hypothyroidism, thoracic aneurysm presented to the hospital with complaints of weakness, aphasia , poor responsiveness and unable to get up from toilet seat.  Admitted to ICU for concern of CVA s/p TPA monitoring and sepsis likely secondary to pna vs UTI. Managed on the floor for the above mentioned symptoms and later transferred to the Telemetry floor due episodes of Bradycardia

## 2018-06-20 NOTE — PROGRESS NOTE ADULT - PROBLEM SELECTOR PLAN 2
Resolved  Likely secondary to PNA but appears to be more CHF / increased volume on CT  cultures negative  As per ID will D/C all antibiotics  ID (Dr. Rosales)

## 2018-06-20 NOTE — PROGRESS NOTE ADULT - SUBJECTIVE AND OBJECTIVE BOX
Subjective: lying in bed confused, agitated , nad . for ppm placement today       PHYSICAL EXAM:    Vital Signs Last 24 Hrs  T(C): 36.8 (20 Jun 2018 11:37), Max: 36.8 (20 Jun 2018 02:16)  T(F): 98.2 (20 Jun 2018 11:37), Max: 98.2 (20 Jun 2018 02:16)  HR: 48 (20 Jun 2018 11:37) (45 - 59)  BP: 165/69 (20 Jun 2018 11:37) (127/45 - 165/69)  BP(mean): --  RR: 18 (20 Jun 2018 11:37) (18 - 20)  SpO2: 100% (20 Jun 2018 11:37) (92% - 100%)    Constitutional: awake confused nad    COURTNEY SCLERA anicteric EOMI   LUNGS bibasilar few rales   CVS s1 s2 aud no murmurs   ABDOMEN soft non tender   NEUROLOGY  no defecits   SKIN no rash   EXTREMITIES no edema no cyanosis         LABS/DIAGNOSTIC TESTS                        8.7    12.2  )-----------( 217      ( 20 Jun 2018 08:00 )             29.7     06-20    147<H>  |  111<H>  |  51<H>  ----------------------------<  136<H>  3.2<L>   |  29  |  0.95    Ca    8.7      20 Jun 2018 08:01  Phos  2.8     06-18  Mg     2.0     06-18      PT/INR - ( 20 Jun 2018 08:01 )   PT: 13.9 sec;   INR: 1.27 ratio         PTT - ( 20 Jun 2018 08:01 )  PTT:29.4 sec      meds   acetaminophen  Suppository 650 milliGRAM(s) Rectal every 6 hours PRN  aspirin  chewable 81 milliGRAM(s) Oral daily  atorvastatin 40 milliGRAM(s) Oral at bedtime  dextrose 5% + sodium chloride 0.9%. 1000 milliLiter(s) IV Continuous <Continuous>  docusate sodium 100 milliGRAM(s) Oral two times a day  furosemide    Tablet 20 milliGRAM(s) Oral daily  haloperidol    Injectable 1 milliGRAM(s) IntraMuscular every 6 hours PRN  heparin  Injectable 5000 Unit(s) SubCutaneous every 8 hours  levothyroxine 100 MICROGram(s) Oral daily  melatonin 3 milliGRAM(s) Oral at bedtime PRN  risperiDONE   Tablet 0.5 milliGRAM(s) Oral two times a day  senna 2 Tablet(s) Oral at bedtime  tamsulosin 0.4 milliGRAM(s) Oral at bedtime        CULTURES  Culture Results: blood  No growth at 5 days. (06-15 @ 09:37)  Culture Results: blood  No growth at 5 days. (06-15 @ 09:37)        RADIOLOGY  < from: US Duplex Venous Lower Ext Complete, Bilateral (06.18.18 @ 18:19) >  EXAM:  US DPLX LWR EXT VEINS COMPL BI                            PROCEDURE DATE:  06/18/2018          INTERPRETATION:  EXAM: US DPLX LWR EXT VEINS COMPL BI   INDICATION: R65.10 SIRS. fever r/o DVT.  COMPARISON: None.    TECHNIQUE: Multiple static images from duplex sonography of the deep   veins of the bilateral lower extremities utilizing color and spectral   Doppler interrogation, without and with compression.    FINDINGS:  The bilateral common femoral, superficial femoral, popliteal and   visualized calf veins are normally compressible and demonstrate normal   spontaneous and phasic flow and/or augmentation. There is no evidence of   deep vein thrombosis.    IMPRESSION:  No evidence of femoropopliteal deep vein thrombosis in either lower  extremity.    < end of copied text >

## 2018-06-20 NOTE — PROGRESS NOTE ADULT - ASSESSMENT
89 yo male from home HHA 12 hours ambulates with walker pmh of BPH, arthiritis hypothyroidism, Aortic aneurysm presented to the hospital with complaints of weakness, aphasia , poor resposiveness, now tachy-gisele on tele.  1.Tele monitoring.  2.IVF while npo.  3.Replace k+.  4.D/W pt's family ,agree for PPM, son Cornelius [phone]  5.ID f/u appreciated, off abx.  6.AAA-Vascular eval noted.  7.Cont current medication.  8.GI and DVT prophylaxis.

## 2018-06-20 NOTE — CHART NOTE - NSCHARTNOTEFT_GEN_A_CORE
recommend palliative care evaluation for AAA repair  If palliative care and family agree for surgical intervention, pt may benefit from EVAR  will follow

## 2018-06-21 LAB
ANION GAP SERPL CALC-SCNC: 5 MMOL/L — SIGNIFICANT CHANGE UP (ref 5–17)
BASOPHILS # BLD AUTO: 0 K/UL — SIGNIFICANT CHANGE UP (ref 0–0.2)
BASOPHILS NFR BLD AUTO: 0.5 % — SIGNIFICANT CHANGE UP (ref 0–2)
BUN SERPL-MCNC: 47 MG/DL — HIGH (ref 7–18)
CALCIUM SERPL-MCNC: 8.5 MG/DL — SIGNIFICANT CHANGE UP (ref 8.4–10.5)
CHLORIDE SERPL-SCNC: 115 MMOL/L — HIGH (ref 96–108)
CO2 SERPL-SCNC: 28 MMOL/L — SIGNIFICANT CHANGE UP (ref 22–31)
CREAT SERPL-MCNC: 0.74 MG/DL — SIGNIFICANT CHANGE UP (ref 0.5–1.3)
EOSINOPHIL # BLD AUTO: 0 K/UL — SIGNIFICANT CHANGE UP (ref 0–0.5)
EOSINOPHIL NFR BLD AUTO: 0.3 % — SIGNIFICANT CHANGE UP (ref 0–6)
FOLATE SERPL-MCNC: 9 NG/ML — SIGNIFICANT CHANGE UP
GLUCOSE SERPL-MCNC: 95 MG/DL — SIGNIFICANT CHANGE UP (ref 70–99)
HCT VFR BLD CALC: 25.2 % — LOW (ref 39–50)
HCT VFR BLD CALC: 28.3 % — LOW (ref 39–50)
HGB BLD-MCNC: 7.5 G/DL — LOW (ref 13–17)
HGB BLD-MCNC: 8.5 G/DL — LOW (ref 13–17)
LYMPHOCYTES # BLD AUTO: 1.5 K/UL — SIGNIFICANT CHANGE UP (ref 1–3.3)
LYMPHOCYTES # BLD AUTO: 15.9 % — SIGNIFICANT CHANGE UP (ref 13–44)
MCHC RBC-ENTMCNC: 29.7 GM/DL — LOW (ref 32–36)
MCHC RBC-ENTMCNC: 30 GM/DL — LOW (ref 32–36)
MCHC RBC-ENTMCNC: 33.2 PG — SIGNIFICANT CHANGE UP (ref 27–34)
MCHC RBC-ENTMCNC: 33.6 PG — SIGNIFICANT CHANGE UP (ref 27–34)
MCV RBC AUTO: 111.6 FL — HIGH (ref 80–100)
MCV RBC AUTO: 112 FL — HIGH (ref 80–100)
MONOCYTES # BLD AUTO: 0.6 K/UL — SIGNIFICANT CHANGE UP (ref 0–0.9)
MONOCYTES NFR BLD AUTO: 6.6 % — SIGNIFICANT CHANGE UP (ref 2–14)
NEUTROPHILS # BLD AUTO: 7.2 K/UL — SIGNIFICANT CHANGE UP (ref 1.8–7.4)
NEUTROPHILS NFR BLD AUTO: 76.7 % — SIGNIFICANT CHANGE UP (ref 43–77)
PLATELET # BLD AUTO: 179 K/UL — SIGNIFICANT CHANGE UP (ref 150–400)
PLATELET # BLD AUTO: 195 K/UL — SIGNIFICANT CHANGE UP (ref 150–400)
POTASSIUM SERPL-MCNC: 3.8 MMOL/L — SIGNIFICANT CHANGE UP (ref 3.5–5.3)
POTASSIUM SERPL-SCNC: 3.8 MMOL/L — SIGNIFICANT CHANGE UP (ref 3.5–5.3)
RBC # BLD: 2.25 M/UL — LOW (ref 4.2–5.8)
RBC # BLD: 2.52 M/UL — LOW (ref 4.2–5.8)
RBC # FLD: 20.3 % — HIGH (ref 10.3–14.5)
RBC # FLD: 22.4 % — HIGH (ref 10.3–14.5)
SODIUM SERPL-SCNC: 148 MMOL/L — HIGH (ref 135–145)
VIT B12 SERPL-MCNC: 705 PG/ML — SIGNIFICANT CHANGE UP (ref 232–1245)
WBC # BLD: 10.1 K/UL — SIGNIFICANT CHANGE UP (ref 3.8–10.5)
WBC # BLD: 9.4 K/UL — SIGNIFICANT CHANGE UP (ref 3.8–10.5)
WBC # FLD AUTO: 10.1 K/UL — SIGNIFICANT CHANGE UP (ref 3.8–10.5)
WBC # FLD AUTO: 9.4 K/UL — SIGNIFICANT CHANGE UP (ref 3.8–10.5)

## 2018-06-21 PROCEDURE — 71045 X-RAY EXAM CHEST 1 VIEW: CPT | Mod: 26

## 2018-06-21 RX ORDER — CEFAZOLIN SODIUM 1 G
2000 VIAL (EA) INJECTION EVERY 8 HOURS
Qty: 0 | Refills: 0 | Status: COMPLETED | OUTPATIENT
Start: 2018-06-21 | End: 2018-06-21

## 2018-06-21 RX ORDER — SODIUM CHLORIDE 9 MG/ML
1000 INJECTION, SOLUTION INTRAVENOUS
Qty: 0 | Refills: 0 | Status: DISCONTINUED | OUTPATIENT
Start: 2018-06-21 | End: 2018-06-22

## 2018-06-21 RX ORDER — HALOPERIDOL DECANOATE 100 MG/ML
2 INJECTION INTRAMUSCULAR ONCE
Qty: 0 | Refills: 0 | Status: COMPLETED | OUTPATIENT
Start: 2018-06-21 | End: 2018-06-21

## 2018-06-21 RX ORDER — CEFAZOLIN SODIUM 1 G
2000 VIAL (EA) INJECTION EVERY 8 HOURS
Qty: 0 | Refills: 0 | Status: DISCONTINUED | OUTPATIENT
Start: 2018-06-21 | End: 2018-06-21

## 2018-06-21 RX ORDER — METOPROLOL TARTRATE 50 MG
12.5 TABLET ORAL
Qty: 0 | Refills: 0 | Status: DISCONTINUED | OUTPATIENT
Start: 2018-06-21 | End: 2018-06-24

## 2018-06-21 RX ORDER — SODIUM CHLORIDE 9 MG/ML
1000 INJECTION, SOLUTION INTRAVENOUS
Qty: 0 | Refills: 0 | Status: DISCONTINUED | OUTPATIENT
Start: 2018-06-21 | End: 2018-06-21

## 2018-06-21 RX ADMIN — TAMSULOSIN HYDROCHLORIDE 0.4 MILLIGRAM(S): 0.4 CAPSULE ORAL at 21:47

## 2018-06-21 RX ADMIN — Medication 100 MILLIGRAM(S): at 18:03

## 2018-06-21 RX ADMIN — Medication 100 MICROGRAM(S): at 05:43

## 2018-06-21 RX ADMIN — Medication 100 MILLIGRAM(S): at 09:37

## 2018-06-21 RX ADMIN — Medication 1 MILLIGRAM(S): at 00:41

## 2018-06-21 RX ADMIN — HEPARIN SODIUM 5000 UNIT(S): 5000 INJECTION INTRAVENOUS; SUBCUTANEOUS at 13:11

## 2018-06-21 RX ADMIN — Medication 12.5 MILLIGRAM(S): at 18:03

## 2018-06-21 RX ADMIN — SENNA PLUS 2 TABLET(S): 8.6 TABLET ORAL at 21:47

## 2018-06-21 RX ADMIN — HEPARIN SODIUM 5000 UNIT(S): 5000 INJECTION INTRAVENOUS; SUBCUTANEOUS at 21:47

## 2018-06-21 RX ADMIN — Medication 100 MILLIGRAM(S): at 13:11

## 2018-06-21 RX ADMIN — HEPARIN SODIUM 5000 UNIT(S): 5000 INJECTION INTRAVENOUS; SUBCUTANEOUS at 05:42

## 2018-06-21 RX ADMIN — ATORVASTATIN CALCIUM 40 MILLIGRAM(S): 80 TABLET, FILM COATED ORAL at 21:47

## 2018-06-21 RX ADMIN — HALOPERIDOL DECANOATE 2 MILLIGRAM(S): 100 INJECTION INTRAMUSCULAR at 14:20

## 2018-06-21 RX ADMIN — Medication 81 MILLIGRAM(S): at 18:03

## 2018-06-21 RX ADMIN — SODIUM CHLORIDE 50 MILLILITER(S): 9 INJECTION, SOLUTION INTRAVENOUS at 13:10

## 2018-06-21 RX ADMIN — Medication 20 MILLIGRAM(S): at 05:43

## 2018-06-21 NOTE — PROGRESS NOTE ADULT - SUBJECTIVE AND OBJECTIVE BOX
Id attending covering for Dr. Ruiz.  Chart is reviewed and patient is examined.     Meds:  MEDICATIONS  (STANDING):  aspirin  chewable 81 milliGRAM(s) Oral daily  atorvastatin 40 milliGRAM(s) Oral at bedtime  dextrose 5% + sodium chloride 0.9%. 1000 milliLiter(s) (50 mL/Hr) IV Continuous <Continuous>  docusate sodium 100 milliGRAM(s) Oral two times a day  furosemide    Tablet 20 milliGRAM(s) Oral daily  heparin  Injectable 5000 Unit(s) SubCutaneous every 8 hours  levothyroxine 100 MICROGram(s) Oral daily  metoprolol tartrate 12.5 milliGRAM(s) Oral two times a day  senna 2 Tablet(s) Oral at bedtime  tamsulosin 0.4 milliGRAM(s) Oral at bedtime    MEDICATIONS  (PRN):  haloperidol    Injectable 1 milliGRAM(s) IV Push every 6 hours PRN Agitation  melatonin 3 milliGRAM(s) Oral at bedtime PRN Insomnia      Allergies:  Allergies    No Known Allergies    Intolerances        ROS  [  ] UNABLE TO ELICIT    General:  [  ] None  [  ] Fever  [  ] Chills  [ x ] Malaise    Skin:  [ x ] None [  ] Rash  [  ] Wound  [  ] Ulcer    HEENT:  [ x ] None  [  ] Sore Throat  [  ] Nasal congestion/ runny nose  [  ] Photophobia [  ] Neck pain      Chest:  [  ] None   [  ] SOB  [ x ] Cough  [  ] None    Cardiovascular:   [ x ] None  [  ] CP  [  ] Palpitation    Gastrointestinal:  [ x ] None  [  ] Abd pain   [  ] Nausea    [  ] Vomiting   [  ] Diarrhea	     Genitourinary:  [ x ] None [  ] Polyuria   [  ] Urgency  [  ] Frequency  [  ] Dysuria    [  ]  Hematuria       Musculoskeletal:  [  ] None [  ] Back Pain	[  ] Body aches  [  ] Joint pain [ x ] Weakness    Neurological: [  ] None [  ]Dizziness  [  ]Visual Disturbance  [  ]Headaches   [ x ] Weakness          PHYSICAL EXAM:  Vital Signs Last 24 Hrs  T(C): 36.6 (21 Jun 2018 15:24), Max: 36.8 (20 Jun 2018 20:11)  T(F): 97.8 (21 Jun 2018 15:24), Max: 98.3 (20 Jun 2018 20:11)  HR: 75 (21 Jun 2018 15:24) (60 - 92)  BP: 127/52 (21 Jun 2018 15:24) (112/48 - 141/54)  BP(mean): --  RR: 18 (21 Jun 2018 15:24) (18 - 20)  SpO2: 95% (21 Jun 2018 15:24) (92% - 97%)    HEENT: [ x ] Wnl  [  ] Pharyngeal congestion    Neck:  [ x ] Supple  [  ]Lymphadenopathy  [ x ] No JVD   [  ] JVD  [  ] Masses   [  ] WNL    CHEST/Respiratory:  [  ]Clear to auscultation  [ x ] Rales on bases  [  ] Rhonchi   [  ] Wheezing     [  ] Chest Tenderness      Cardiovascular:  [ x ] Reg S1 S2   [  ] Irreg S1 S2   [ x ]No Murmur  [  ] +ve Murmurs  [  ]Systolic [  ]Diastolic      Abdomen:  [ x ] Soft  [  ] No tendrerness  [  ] Tenderness  [  ] Organomegaly  [ x ] Mild ABD Distention  [  ] Rigidity                       [ x ] No Regidity                       [ x ] No Rebound Tenderness  [  ] No Guarding Rigidity  [  ] Rebound Tenderness[  ] Guarding Rigidity                          [ x ]  +ve Bowel Sounds  [  ] Decreased Bowel Sounds    [  ] Absent Bowel Sounds                            Extremities: [ x ] No edema [  ] Edema  [  ] Clubbing   [  ] Cyanosis                         [ x ] No Tender Calf muscles  [  ] Tender Calf muscles                        [ x ] Palpable peripheral pulses    Neurological: [ x ] Awake  [ x ] Alert  [ x ] Oriented  x  2                           [  ] Confused  [ x ] Drowzy  [  ] repond to painful stimuli  [  ] Unresponsive    Skin:  [ x ] Intact [  ] Redness [  ] Thrombophlebitis  [  ] Rashes  [  ] Dry  [  ] Ulcers    Ortho:  [  ] Joint Swelling  [  ] Joint erythema [  ] Joint tenderness                [  ] Increased temp. to touch  [  ] DJD [ x ] WNL            LABS/DIAGNOSTIC TESTS                        7.5    9.4   )-----------( 179      ( 21 Jun 2018 08:01 )             25.2   06-21    148<H>  |  115<H>  |  47<H>  ----------------------------<  95  3.8   |  28  |  0.74    Ca    8.5      21 Jun 2018 08:01          CULTURES:     Culture - Blood (06.15.18 @ 09:37)    Specimen Source: .Blood Blood    Culture Results:   No growth to date.    Culture - Blood (06.15.18 @ 09:37)    Specimen Source: .Blood Blood    Culture Results:   No growth to date.      Culture - Urine (06.12.18 @ 23:37)    Specimen Source: .Urine Clean Catch (Midstream)    Culture Results:   No growth        RADIOLOGY:    EXAM:  XR CHEST PORTABLE ROUTINE 1V                            PROCEDURE DATE:  06/21/2018          INTERPRETATION:  AP semisupine chest on June 21, 2018 at 9:36 AM. Patient   is being followed for positive lung findings.    Heart enlargement and left-sided pacemaker again noted.    On June 20 there were central congestive findings and a right upper lobe   infiltrate. The central congestion has improved with mild residual. Right   upper lobe infiltrate is relatively unchanged.    IMPRESSION: Asabove.          EXAM:  CT ABDOMEN AND PELVIS                          EXAM:  CT CHEST                            PROCEDURE DATE:  06/16/2018          INTERPRETATION:  CT CHEST/ABDOMEN/PELVIS:     CLINICAL INFORMATION: Fever    COMPARISON: Chest x-ray of the previous day.    PROCEDURE:  Using multislice helical CT, 2.5 mm sections were obtained   from the thoracic inlet through the ischial tuberosities without the   administration of intravenous contrast. Oral contrast was not   administered.    Coronal and sagittal reformations were performed by  CT technologist and   made available for review.    FINDINGS:  The visualized structures of the lower neck are unremarkable.   The visualized aorta is of normal course and caliber. The heart is not   enlarged. There is no evidence of pericardial effusion.    There is no evidence of axillary, hilar, or mediastinal lymphadenopathy.   There is coronary artery calcification and atherosclerotic calcification   of the aorta.    No focal lung consolidations identified. No evidence of pneumothorax. No   pulmonary nodules identified. There are small bilateral pleural   effusions. There is passive atelectasis at the lung bases. There is   pulmonary vascular.      The liver is of normal contour. No evidence of focal hepatic lesion on   this nonenhanced examination. The gallbladder is present. No evidence of   intra or extrahepatic biliary dilatation.     The pancreas, spleen, and adrenal glands are grossly unremarkable. There   is a large nonobstructive stone in the midpole of the left kidney. There   is no evidence of hydronephrosis or perinephric stranding. No evidence of    nephrolithiasis.The bladder is unremarkable.    No evidence of lymphadenopathy utilizing CT size criteria. There is   aneurysmal dilatation of the infrarenal abdominal aorta with peripheral   calcification. The aneurysm measures up to 6.4 cm. It terminates above   the bifurcation. 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   There are bilateral inguinal hernias with fat. There are also   nonobstructed small bowel loops within the right inguinal hernia..    There is no evidence of bowel obstruction. There is no evidence of   pneumoperitoneum or free fluid.    The visualized osseous structures demonstrate osteopenia and degenerative   changes. There is moderate compression fracture of L2 which is   indeterminate in age. There is severe compression fracture of T11 with   retropulsion into the spinal canal. This is also indeterminate in age..    IMPRESSION: Large infrarenal abdominal aortic aneurysm measuring up to   6.4 cm which is also noted on the plain films of lumbar spine of   9/8/2017. 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   Bilateral pleural effusions with mild bibasilar atelectasis. Mild   pulmonary vascular congestion.  Nonobstructive left renal calculus  Nonobstructing right inguinal hernia containing small bowel loops  Indeterminate age severe compression fracture T12 and moderate   compression fracture L2      Assessment and Recommendation:   89 yo male from home HHA 12 hours ambulates with walker pmh of BPH, arthiritis hypothyroidism, thoracic aneurysm  presented to the hospital with complaints of weakness, aphasia , poor resposiveness and unable to get up from toilet seat. history obtained by family at bedside. According to his wife, he had been "shaking" for most of the night. On 6/12/18 he awoke and was able to walk to the bathroom with his walker as usual. He sat on the toilet, and then had difficulty getting off the toiletThere was diminished verbal output. wife said he used his left arm to try to get up but she felt his right side was weak. his eyes were closed and he was not responsive. she called EMS. ROS positive for increased urinary frequency and buring for past couple of days. no chest pain , SOB cough.    in the ER Stroke code called on arrival at 7:15a, tele-stroke activated at 7:20a. VSS stable  EXCEPT tmax 102.4. ON TELE stroke patient had NIH scale of 15. b/l weakness greater on right side. code sepsis was also initiaated. labs pertinent for wbc count of q12 , ekg nsr left axis. lactate of 2.4. cxr : Mild pulmonary vascular congestion. Small right basilar nodular opacity. ct head: Moderate severe chronic small vessel ischemic changes in the frontal parietal white matter and small chronic appearing right frontal cortical infarct. TPA given at 9 am , and icu consult called.  code sepsis was also initiated. labs pertinent for wbc count of q12 , ekg nsr left axis. lactate of 2.4. cxr : Mild pulmonary vascular congestion. Small right basilar nodular opacity. started on rocephin and zmax    # fevers unclear etiology , bladder scan with only 42 cc of urine r/o worsening pneumonia.(improved)  # no objective evidence of bacterial infection as cause of fevers on adm . all cx neg /rvp swab neg /ct chest with no consolidation . pct slightly elevated , bnp elevated   # mild elevation of wbc improved and WBC is normal today 6/21/18 (patient is off ABX).     plan  maintain off ab   bladder scan , if more than or equal to 200cc will need Nieto   rx of chf as per pmd.  S/P PPM as per cardio  RPT CXR D/W medical resident.    Discussed with medical resident and patient's family.

## 2018-06-21 NOTE — PROGRESS NOTE ADULT - PROBLEM SELECTOR PLAN 3
Large infrarenal abdominal aortic aneurysm measuring up to   6.4 cm which is also noted on the plain films of lumbar spine of 9/8/2017  Vascular Surgery Dr. Bolanos  - Previously made decision to avoid AAA surgery   - Cont med mgmt  - Reassess candidacy for AAA intervention (likely candidate for EVAR). Large infrarenal abdominal aortic aneurysm measuring up to   6.4 cm which is also noted on the plain films of lumbar spine of 9/8/2017  Vascular Surgery Dr. Bolanos  - Previously made decision to avoid AAA surgery   - Cont med mgmt  - family refuse any surgery for AAA. and patient is not a surgical candidate and the family does not want any invasive measures and understand the rupture of the AAA.

## 2018-06-21 NOTE — PROGRESS NOTE ADULT - SUBJECTIVE AND OBJECTIVE BOX
==================PGY 1 Note===================   Discussed with supervising resident and primary attending    ================CHIEF COMPLAINT===============  Patient is a 89y old  Male who presents with a chief complaint of weakness. aphasia (12 Jun 2018 10:18)        =========INTERVAL HPI/OVERNIGHT EVENTS=========  Agitated      ============CURRENT MEDICATIONS===============    MEDICATIONS  (STANDING):  aspirin  chewable 81 milliGRAM(s) Oral daily  atorvastatin 40 milliGRAM(s) Oral at bedtime  dextrose 5% + sodium chloride 0.9%. 1000 milliLiter(s) (50 mL/Hr) IV Continuous <Continuous>  docusate sodium 100 milliGRAM(s) Oral two times a day  furosemide    Tablet 20 milliGRAM(s) Oral daily  haloperidol    Injectable 2 milliGRAM(s) IV Push once  heparin  Injectable 5000 Unit(s) SubCutaneous every 8 hours  levothyroxine 100 MICROGram(s) Oral daily  metoprolol tartrate 12.5 milliGRAM(s) Oral two times a day  senna 2 Tablet(s) Oral at bedtime  tamsulosin 0.4 milliGRAM(s) Oral at bedtime    MEDICATIONS  (PRN):  haloperidol    Injectable 1 milliGRAM(s) IV Push every 6 hours PRN Agitation  melatonin 3 milliGRAM(s) Oral at bedtime PRN Insomnia        ============REVIEW OF SYSTEMS==================    CONSTITUTIONAL: No fever  EYES: no acute visual disturbances  NECK: No pain or stiffness  RESPIRATORY: No cough; No shortness of breath  CARDIOVASCULAR: No chest pain, no palpitations  GASTROINTESTINAL: No pain. No nausea or vomiting; No diarrhea   NEUROLOGICAL: No headache or numbness, no tremors  MUSCULOSKELETAL: No joint pain, no muscle pain  GENITOURINARY: no dysuria, no frequency, no hesitancy  PSYCHIATRY: no depression , no anxiety  ALL OTHER  ROS negative      ================VITALS SIGNS=====================  Vital Signs Last 24 Hrs  T(C): 36.2 (21 Jun 2018 11:12), Max: 36.8 (20 Jun 2018 20:11)  T(F): 97.1 (21 Jun 2018 11:12), Max: 98.3 (20 Jun 2018 20:11)  HR: 60 (21 Jun 2018 11:12) (55 - 92)  BP: 118/44 (21 Jun 2018 11:12) (112/48 - 155/79)  BP(mean): 82 (20 Jun 2018 18:03) (80 - 104)  RR: 18 (21 Jun 2018 11:12) (14 - 27)  SpO2: 94% (21 Jun 2018 11:12) (92% - 100%)    ===============PHYSICAL EXAM====================    GENERAL: NAD  HEENT: Normocephalic;  conjunctivae and sclerae clear; moist mucous membranes;   NECK : supple  CHEST/LUNG: Clear to auscultation bilaterally with good air entry   HEART: S1 S2  regular; no murmurs, gallops or rubs  ABDOMEN: Soft, Nontender, Nondistended; Bowel sounds present  EXTREMITIES: no cyanosis; no edema; no calf tenderness  SKIN: warm and dry; no rash pacemaker on place  NERVOUS SYSTEM:  Awake and alert; Oriented  to place, person and time    ==============LABORATORIES======================  LABS:                        7.5    9.4   )-----------( 179      ( 21 Jun 2018 08:01 )             25.2     06-21    148<H>  |  115<H>  |  47<H>  ----------------------------<  95  3.8   |  28  |  0.74    Ca    8.5      21 Jun 2018 08:01      PT/INR - ( 20 Jun 2018 08:01 )   PT: 13.9 sec;   INR: 1.27 ratio         PTT - ( 20 Jun 2018 08:01 )  PTT:29.4 sec    CAPILLARY BLOOD GLUCOSE          =============INPUTS/OUPUTS=====================    06-20-18 @ 07:01  -  06-21-18 @ 07:00  --------------------------------------------------------  IN: 250 mL / OUT: 0 mL / NET: 250 mL          RADIOLOGY & ADDITIONAL TESTS:    Imaging Personally Reviewed:  YES    Consultant(s) Notes Reviewed:   YES    Care Discussed with Consultants : YES    Plan of care was discussed with patient  and /or primary care giver; all questions and concerns were addressed and care was aligned with patient's wishes. Time was allowed for questions that were answered to the best of my abilities

## 2018-06-21 NOTE — PROGRESS NOTE ADULT - ASSESSMENT
85 y/o male from home H[facility] 12 hours ambulates with walker pmh of BPH, arthritis hypothyroidism, thoracic aneurysm presented to the hospital with complaints of weakness, aphasia , poor responsiveness and unable to get up from toilet seat.  Admitted to ICU for concern of CVA s/p TPA monitoring and sepsis likely secondary to pna vs UTI. Managed on the floor for the above mentioned symptoms and later transferred to the Telemetry floor due episodes of Bradycardia. 85 y/o male from home HHA 12 hours ambulates with walker pmh of BPH, arthritis hypothyroidism, thoracic aneurysm presented to the hospital with complaints of weakness, aphasia , poor responsiveness and unable to get up from toilet seat.  Admitted to ICU for concern of CVA s/p TPA monitoring and sepsis likely secondary to pna vs UTI. Managed on the floor for the above mentioned symptoms and later transferred to the Telemetry floor due episodes of Bradycardia. patient had a pacemaker placement yesterday.  Family has also refused any intervention for the AAA in the past and now. Patient is a at very high risk for any vascular procedures.  Will get PT consult and likely d/c to [facility] .

## 2018-06-21 NOTE — PROGRESS NOTE ADULT - ASSESSMENT
89 yo male from home HHA 12 hours ambulates with walker pmh of BPH, arthiritis hypothyroidism, Aortic aneurysm presented to the hospital with complaints of weakness, aphasia , poor resposiveness, now tachy-gisele on tele.  1.D/C Tele monitoring.  2.S/P PPM f/u with Dr. Wilson 2 weeks..  3.Add low dose b blocker.  4.ID f/u appreciated, off abx.  5.Psych eval noted.  6.AAA-Vascular eval noted.  7.GI and DVT prophylaxis.

## 2018-06-21 NOTE — PROGRESS NOTE ADULT - PROBLEM SELECTOR PLAN 2
Resolved  Leukocytosis resolved  Likely secondary to PNA but appears to be more CHF / increased volume on CT  cultures negative  As per ID will D/C all antibiotics  ID (Dr. Rosales)

## 2018-06-21 NOTE — PROGRESS NOTE ADULT - SUBJECTIVE AND OBJECTIVE BOX
CHIEF COMPLAINT:Patient is a 89y old  Male who presents with a chief complaint of weakness. aphasia. Pt appears comfortable.    	  REVIEW OF SYSTEMS:  [x ] Unable to obtain    PHYSICAL EXAM:  T(C): 36.3 (06-21-18 @ 07:41), Max: 36.8 (06-20-18 @ 11:37)  HR: 62 (06-21-18 @ 07:41) (48 - 92)  BP: 112/48 (06-21-18 @ 07:41) (112/48 - 165/69)  RR: 18 (06-21-18 @ 07:41) (14 - 27)  SpO2: 95% (06-21-18 @ 07:41) (92% - 100%)    I&O's Summary    20 Jun 2018 07:01  -  21 Jun 2018 07:00  --------------------------------------------------------  IN: 250 mL / OUT: 0 mL / NET: 250 mL        Appearance: Normal	  HEENT:   Normal oral mucosa, PERRL, EOMI	  Lymphatic: No lymphadenopathy  Cardiovascular: Normal S1 S2, No JVD, No murmurs, No edema  Respiratory: Lungs clear to auscultation	  Gastrointestinal:  Soft, Non-tender, + BS	  Skin: No rashes, No ecchymoses, No cyanosis	  Extremities: Normal range of motion, No clubbing, cyanosis or edema  Vascular: Peripheral pulses palpable 2+ bilaterally    MEDICATIONS  (STANDING):  aspirin  chewable 81 milliGRAM(s) Oral daily  atorvastatin 40 milliGRAM(s) Oral at bedtime  ceFAZolin   IVPB 2000 milliGRAM(s) IV Intermittent every 8 hours  docusate sodium 100 milliGRAM(s) Oral two times a day  furosemide    Tablet 20 milliGRAM(s) Oral daily  heparin  Injectable 5000 Unit(s) SubCutaneous every 8 hours  levothyroxine 100 MICROGram(s) Oral daily  metoprolol tartrate 12.5 milliGRAM(s) Oral two times a day  senna 2 Tablet(s) Oral at bedtime  tamsulosin 0.4 milliGRAM(s) Oral at bedtime      TELEMETRY: 	  paced  	  LABS:	 	                         8.7    12.2  )-----------( 217      ( 20 Jun 2018 08:00 )             29.7     06-20    147<H>  |  111<H>  |  51<H>  ----------------------------<  136<H>  3.2<L>   |  29  |  0.95    Ca    8.7      20 Jun 2018 08:01      proBNP: Serum Pro-Brain Natriuretic Peptide: 7688 pg/mL (06-16 @ 07:37)    Lipid Profile: Cholesterol 105  LDL 47  HDL 43  TG 76  Cholesterol 118  LDL 55  HDL 48  TG 76    HgA1c: Hemoglobin A1C, Whole Blood: 5.2 % (06-13 @ 18:32)    TSH: Thyroid Stimulating Hormone, Serum: 1.76 uU/mL (06-13 @ 12:14)

## 2018-06-22 DIAGNOSIS — R41.0 DISORIENTATION, UNSPECIFIED: ICD-10-CM

## 2018-06-22 LAB
ANION GAP SERPL CALC-SCNC: 6 MMOL/L — SIGNIFICANT CHANGE UP (ref 5–17)
ANISOCYTOSIS BLD QL: SLIGHT — SIGNIFICANT CHANGE UP
BASE EXCESS BLDA CALC-SCNC: 2.8 MMOL/L — HIGH (ref -2–2)
BLOOD GAS COMMENTS ARTERIAL: SIGNIFICANT CHANGE UP
BUN SERPL-MCNC: 35 MG/DL — HIGH (ref 7–18)
CALCIUM SERPL-MCNC: 8.4 MG/DL — SIGNIFICANT CHANGE UP (ref 8.4–10.5)
CHLORIDE SERPL-SCNC: 110 MMOL/L — HIGH (ref 96–108)
CO2 SERPL-SCNC: 30 MMOL/L — SIGNIFICANT CHANGE UP (ref 22–31)
CREAT SERPL-MCNC: 0.75 MG/DL — SIGNIFICANT CHANGE UP (ref 0.5–1.3)
DACRYOCYTES BLD QL SMEAR: SLIGHT — SIGNIFICANT CHANGE UP
ELLIPTOCYTES BLD QL SMEAR: SLIGHT — SIGNIFICANT CHANGE UP
EOSINOPHIL NFR BLD AUTO: 2 % — SIGNIFICANT CHANGE UP (ref 0–6)
GIANT PLATELETS BLD QL SMEAR: PRESENT — SIGNIFICANT CHANGE UP
GLUCOSE SERPL-MCNC: 111 MG/DL — HIGH (ref 70–99)
HCO3 BLDA-SCNC: 27 MMOL/L — SIGNIFICANT CHANGE UP (ref 23–27)
HCT VFR BLD CALC: 28.1 % — LOW (ref 39–50)
HGB BLD-MCNC: 8.5 G/DL — LOW (ref 13–17)
HOROWITZ INDEX BLDA+IHG-RTO: 100 — SIGNIFICANT CHANGE UP
LYMPHOCYTES # BLD AUTO: 13 % — SIGNIFICANT CHANGE UP (ref 13–44)
MACROCYTES BLD QL: SIGNIFICANT CHANGE UP
MACROCYTES OVAL BLD QL SMEAR: SLIGHT — SIGNIFICANT CHANGE UP
MANUAL DIF COMMENT BLD-IMP: SIGNIFICANT CHANGE UP
MCHC RBC-ENTMCNC: 30.2 GM/DL — LOW (ref 32–36)
MCHC RBC-ENTMCNC: 34 PG — SIGNIFICANT CHANGE UP (ref 27–34)
MCV RBC AUTO: 112.6 FL — HIGH (ref 80–100)
MONOCYTES NFR BLD AUTO: 7 % — SIGNIFICANT CHANGE UP (ref 2–14)
MYELOCYTES NFR BLD: 1 % — HIGH (ref 0–0)
NEUTROPHILS NFR BLD AUTO: 75 % — SIGNIFICANT CHANGE UP (ref 43–77)
NEUTS BAND # BLD: 1 % — SIGNIFICANT CHANGE UP (ref 0–8)
OVALOCYTES BLD QL SMEAR: SLIGHT — SIGNIFICANT CHANGE UP
PCO2 BLDA: 40 MMHG — SIGNIFICANT CHANGE UP (ref 32–46)
PH BLDA: 7.44 — SIGNIFICANT CHANGE UP (ref 7.35–7.45)
PLAT MORPH BLD: NORMAL — SIGNIFICANT CHANGE UP
PLATELET # BLD AUTO: 204 K/UL — SIGNIFICANT CHANGE UP (ref 150–400)
PO2 BLDA: 259 MMHG — HIGH (ref 74–108)
POIKILOCYTOSIS BLD QL AUTO: SLIGHT — SIGNIFICANT CHANGE UP
POLYCHROMASIA BLD QL SMEAR: SLIGHT — SIGNIFICANT CHANGE UP
POTASSIUM SERPL-MCNC: 3.3 MMOL/L — LOW (ref 3.5–5.3)
POTASSIUM SERPL-SCNC: 3.3 MMOL/L — LOW (ref 3.5–5.3)
RBC # BLD: 2.49 M/UL — LOW (ref 4.2–5.8)
RBC # FLD: 20.8 % — HIGH (ref 10.3–14.5)
RBC BLD AUTO: ABNORMAL
SAO2 % BLDA: SIGNIFICANT CHANGE UP % (ref 92–96)
SCHISTOCYTES BLD QL AUTO: SLIGHT — SIGNIFICANT CHANGE UP
SMUDGE CELLS # BLD: PRESENT — SIGNIFICANT CHANGE UP
SODIUM SERPL-SCNC: 146 MMOL/L — HIGH (ref 135–145)
TOXIC GRANULES BLD QL SMEAR: PRESENT — SIGNIFICANT CHANGE UP
VARIANT LYMPHS # BLD: 1 % — SIGNIFICANT CHANGE UP (ref 0–6)
WBC # BLD: 11 K/UL — HIGH (ref 3.8–10.5)
WBC # FLD AUTO: 11 K/UL — HIGH (ref 3.8–10.5)

## 2018-06-22 PROCEDURE — 71045 X-RAY EXAM CHEST 1 VIEW: CPT | Mod: 26

## 2018-06-22 PROCEDURE — 93010 ELECTROCARDIOGRAM REPORT: CPT

## 2018-06-22 RX ORDER — POTASSIUM CHLORIDE 20 MEQ
40 PACKET (EA) ORAL EVERY 4 HOURS
Qty: 0 | Refills: 0 | Status: COMPLETED | OUTPATIENT
Start: 2018-06-22 | End: 2018-06-22

## 2018-06-22 RX ORDER — RISPERIDONE 4 MG/1
1 TABLET ORAL AT BEDTIME
Qty: 0 | Refills: 0 | Status: DISCONTINUED | OUTPATIENT
Start: 2018-06-22 | End: 2018-06-24

## 2018-06-22 RX ORDER — MORPHINE SULFATE 50 MG/1
1 CAPSULE, EXTENDED RELEASE ORAL ONCE
Qty: 0 | Refills: 0 | Status: DISCONTINUED | OUTPATIENT
Start: 2018-06-22 | End: 2018-06-22

## 2018-06-22 RX ORDER — FUROSEMIDE 40 MG
20 TABLET ORAL DAILY
Qty: 0 | Refills: 0 | Status: DISCONTINUED | OUTPATIENT
Start: 2018-06-22 | End: 2018-06-24

## 2018-06-22 RX ORDER — HEPARIN SODIUM 5000 [USP'U]/ML
5000 INJECTION INTRAVENOUS; SUBCUTANEOUS EVERY 12 HOURS
Qty: 0 | Refills: 0 | Status: DISCONTINUED | OUTPATIENT
Start: 2018-06-22 | End: 2018-06-24

## 2018-06-22 RX ORDER — FUROSEMIDE 40 MG
20 TABLET ORAL ONCE
Qty: 0 | Refills: 0 | Status: COMPLETED | OUTPATIENT
Start: 2018-06-22 | End: 2018-06-22

## 2018-06-22 RX ADMIN — Medication 100 MILLIGRAM(S): at 17:39

## 2018-06-22 RX ADMIN — RISPERIDONE 1 MILLIGRAM(S): 4 TABLET ORAL at 22:39

## 2018-06-22 RX ADMIN — Medication 81 MILLIGRAM(S): at 12:18

## 2018-06-22 RX ADMIN — HEPARIN SODIUM 5000 UNIT(S): 5000 INJECTION INTRAVENOUS; SUBCUTANEOUS at 06:14

## 2018-06-22 RX ADMIN — Medication 20 MILLIGRAM(S): at 06:14

## 2018-06-22 RX ADMIN — Medication 40 MILLIEQUIVALENT(S): at 17:39

## 2018-06-22 RX ADMIN — Medication 100 MICROGRAM(S): at 06:13

## 2018-06-22 RX ADMIN — ATORVASTATIN CALCIUM 40 MILLIGRAM(S): 80 TABLET, FILM COATED ORAL at 22:45

## 2018-06-22 RX ADMIN — Medication 12.5 MILLIGRAM(S): at 06:13

## 2018-06-22 RX ADMIN — SENNA PLUS 2 TABLET(S): 8.6 TABLET ORAL at 22:39

## 2018-06-22 RX ADMIN — TAMSULOSIN HYDROCHLORIDE 0.4 MILLIGRAM(S): 0.4 CAPSULE ORAL at 22:39

## 2018-06-22 RX ADMIN — Medication 40 MILLIEQUIVALENT(S): at 15:02

## 2018-06-22 RX ADMIN — HEPARIN SODIUM 5000 UNIT(S): 5000 INJECTION INTRAVENOUS; SUBCUTANEOUS at 17:39

## 2018-06-22 RX ADMIN — Medication 20 MILLIGRAM(S): at 01:01

## 2018-06-22 RX ADMIN — Medication 20 MILLIGRAM(S): at 12:14

## 2018-06-22 RX ADMIN — Medication 12.5 MILLIGRAM(S): at 17:39

## 2018-06-22 NOTE — PHYSICAL THERAPY INITIAL EVALUATION ADULT - GAIT DEVIATIONS NOTED, PT EVAL
increased time in double stance/decreased velocity of limb motion/decreased stride length/decreased step length/decreased kasia/decreased swing-to-stance ratio

## 2018-06-22 NOTE — PHYSICAL THERAPY INITIAL EVALUATION ADULT - PERTINENT HX OF CURRENT PROBLEM, REHAB EVAL
Patient was brought to ED s/p weakness on R side and aphasia.Imaging shows no stenosis or acute changes in the brain
Pt admitted w/right side weakness and aphasia, S/P TPA in ED

## 2018-06-22 NOTE — PROGRESS NOTE ADULT - SUBJECTIVE AND OBJECTIVE BOX
CHIEF COMPLAINT:Patient is a 89y old  Male who presents with a chief complaint of weakness. aphasia. Pt appears comfortable.    	  REVIEW OF SYSTEMS:  CONSTITUTIONAL: No fever, weight loss, or fatigue  EYES: No eye pain, visual disturbances, or discharge  ENT:  No difficulty hearing, tinnitus, vertigo; No sinus or throat pain  NECK: No pain or stiffness  RESPIRATORY: No cough, wheezing, chills or hemoptysis; No Shortness of Breath  CARDIOVASCULAR: No chest pain, palpitations, passing out, dizziness, or leg swelling  GASTROINTESTINAL: No abdominal or epigastric pain. No nausea, vomiting, or hematemesis; No diarrhea or constipation. No melena or hematochezia.  GENITOURINARY: No dysuria, frequency, hematuria, or incontinence  NEUROLOGICAL: No headaches, memory loss, loss of strength, numbness, or tremors  SKIN: No itching, burning, rashes, or lesions   LYMPH Nodes: No enlarged glands  ENDOCRINE: No heat or cold intolerance; No hair loss  MUSCULOSKELETAL: No joint pain or swelling; No muscle, back, or extremity pain  PSYCHIATRIC: No depression, anxiety, mood swings, or difficulty sleeping  HEME/LYMPH: No easy bruising, or bleeding gums  ALLERGY AND IMMUNOLOGIC: No hives or eczema	      PHYSICAL EXAM:  T(C): 36.4 (06-22-18 @ 07:15), Max: 36.8 (06-22-18 @ 00:00)  HR: 116 (06-22-18 @ 07:15) (60 - 116)  BP: 131/58 (06-22-18 @ 07:15) (118/44 - 133/47)  RR: 19 (06-22-18 @ 07:15) (18 - 21)  SpO2: 98% (06-22-18 @ 07:15) (94% - 100%)  Wt(kg): --  I&O's Summary    21 Jun 2018 07:01  -  22 Jun 2018 07:00  --------------------------------------------------------  IN: 350 mL / OUT: 0 mL / NET: 350 mL        Appearance: Normal	  HEENT:   Normal oral mucosa, PERRL, EOMI	  Lymphatic: No lymphadenopathy  Cardiovascular: Normal S1 S2, No JVD, No murmurs, No edema  Respiratory: Lungs clear to auscultation	  Psychiatry: A & O x 3, Mood & affect appropriate  Gastrointestinal:  Soft, Non-tender, + BS	  Skin: No rashes, No ecchymoses, No cyanosis	  Neurologic: Non-focal  Extremities: Normal range of motion, No clubbing, cyanosis or edema  Vascular: Peripheral pulses palpable 2+ bilaterally    MEDICATIONS  (STANDING):  aspirin  chewable 81 milliGRAM(s) Oral daily  atorvastatin 40 milliGRAM(s) Oral at bedtime  docusate sodium 100 milliGRAM(s) Oral two times a day  furosemide    Tablet 20 milliGRAM(s) Oral daily  heparin  Injectable 5000 Unit(s) SubCutaneous every 8 hours  levothyroxine 100 MICROGram(s) Oral daily  metoprolol tartrate 12.5 milliGRAM(s) Oral two times a day  senna 2 Tablet(s) Oral at bedtime  tamsulosin 0.4 milliGRAM(s) Oral at bedtime      	  LABS:	 	                         8.5    11.0  )-----------( 204      ( 22 Jun 2018 06:32 )             28.1     06-22    146<H>  |  110<H>  |  35<H>  ----------------------------<  111<H>  3.3<L>   |  30  |  0.75    Ca    8.4      22 Jun 2018 06:32      proBNP: Serum Pro-Brain Natriuretic Peptide: 7688 pg/mL (06-16 @ 07:37)    Lipid Profile: Cholesterol 105  LDL 47  HDL 43  TG 76  Cholesterol 118  LDL 55  HDL 48  TG 76    HgA1c: Hemoglobin A1C, Whole Blood: 5.2 % (06-13 @ 18:32)    TSH: Thyroid Stimulating Hormone, Serum: 1.76 uU/mL (06-13 @ 12:14)      	  EXAM:  XR CHEST PORTABLE IMMED 1V                            PROCEDURE DATE:  06/22/2018          INTERPRETATION:  Clinical information: Respiratory distress.    Portable chest June 22, 2018, 1:11 AM    Compared with one day prior    Impression: A dual-lead cardiac pacemaker is unchanged. Pulmonary edema   and an enlarged cardiac silhouette is again seen consistent with   congestive heart failure. No focal pulmonary consolidation is noted.

## 2018-06-22 NOTE — PHYSICAL THERAPY INITIAL EVALUATION ADULT - DIAGNOSIS, PT EVAL
Patient was unable to perform AROM at this time secondary to sedation;PROM WFL'S, Tone normal,DTR' present, no skin changes
difficulty with bed mobility, transfers and ambulation due to generalized weakness

## 2018-06-22 NOTE — PHYSICAL THERAPY INITIAL EVALUATION ADULT - PLANNED THERAPY INTERVENTIONS, PT EVAL
bed mobility training/strengthening/balance training/gait training/ROM/transfer training
balance training/bed mobility training/transfer training/strengthening/gait training

## 2018-06-22 NOTE — PROGRESS NOTE ADULT - PROBLEM SELECTOR PLAN 4
Likely sundowning.   Started in Risperdal by Dr Luis  No restraints or Haldol  Needs frequent verbal and physical redirection

## 2018-06-22 NOTE — CHART NOTE - NSCHARTNOTEFT_GEN_A_CORE
PGY-3 NOTE:    Called to see patient for oxygen saturation in mid 80s on 4L nasal cannula. Patient noted to be in respiratory distress, mouth breathing, congested on lung auscultation. Given 20mg lasix IV times 1, discontinued IV fluids. Placed on 50% FiO2 via ventimask with improvement in saturation to 93%. Patient is urinating, reports that breathing is better. Xray chest confirms congestion, bilateral pleural effusions. Patient is not in distress at this time. Will continue to reassess.

## 2018-06-22 NOTE — CHART NOTE - NSCHARTNOTEFT_GEN_A_CORE
Sonia/Pall Attending note    pt seen at bedside with wife and daughter - pt is restless, with wrist restraints due to pulling on iv line and pacemaker incision site.   pt is advanced dementia with hospital induced delirium and deconditioning. Pt is a holocaust survivor and tends to get very agitated when restricted as per family.   remove all restraints and adjust left arm sling, reinforce left iv site and pacemaker incision site  keep enhanced supervision - pt needs frequent verbal and physical redirection  avoid chemical restraints as well such as haldol  will give risperdal 1mg QHS for pt's hx of sundowning in the hospital - discussed with Dr. Isaacs and Dr. Umanzor in detail.     pt is for discharge likely on Sunday back to [facility].     will reinforce OOTBC and assistance to bathroom as pt tolerated.     discussed in detail with family at bedside.

## 2018-06-22 NOTE — PHYSICAL THERAPY INITIAL EVALUATION ADULT - RANGE OF MOTION EXAMINATION, REHAB EVAL
except for left sh NT-prec observed/bilateral lower extremity ROM was WFL (within functional limits)/bilateral upper extremity ROM was WFL (within functional limits)

## 2018-06-22 NOTE — PROGRESS NOTE ADULT - ATTENDING COMMENTS
Patient is seen and examined. Case reviewed with the medical team. Above note is appreciated. Will follow up clinically. Continue DVT prophylaxis. Case discussed with Cardiology and Dr. Luis. Will hold haldol and increase risperidone to one mg. Will arrange for discharge on sunday 6/24 to [facility].

## 2018-06-22 NOTE — PROGRESS NOTE ADULT - SUBJECTIVE AND OBJECTIVE BOX
Id attending covering for Dr. Ruiz.  Chart is reviewed and patient is examined.     MEDICATIONS  (STANDING):  aspirin  chewable 81 milliGRAM(s) Oral daily  atorvastatin 40 milliGRAM(s) Oral at bedtime  docusate sodium 100 milliGRAM(s) Oral two times a day  furosemide   Injectable 20 milliGRAM(s) IV Push daily  heparin  Injectable 5000 Unit(s) SubCutaneous every 12 hours  levothyroxine 100 MICROGram(s) Oral daily  metoprolol tartrate 12.5 milliGRAM(s) Oral two times a day  senna 2 Tablet(s) Oral at bedtime  tamsulosin 0.4 milliGRAM(s) Oral at bedtime    MEDICATIONS  (PRN):  haloperidol    Injectable 1 milliGRAM(s) IV Push every 6 hours PRN Agitation  melatonin 3 milliGRAM(s) Oral at bedtime PRN Insomnia      Allergies:  Allergies    No Known Allergies    Intolerances        ROS  [  ] UNABLE TO ELICIT    General:  [  ] None  [  ] Fever  [  ] Chills  [ x ] Malaise    Skin:  [ x ] None [  ] Rash  [  ] Wound  [  ] Ulcer    HEENT:  [ x ] None  [  ] Sore Throat  [  ] Nasal congestion/ runny nose  [  ] Photophobia [  ] Neck pain      Chest:  [  ] None   [  ] SOB  [ x ] Cough  [  ] None    Cardiovascular:   [ x ] None  [  ] CP  [  ] Palpitation    Gastrointestinal:  [ x ] None  [  ] Abd pain   [  ] Nausea    [  ] Vomiting   [  ] Diarrhea	     Genitourinary:  [ x ] None [  ] Polyuria   [  ] Urgency  [  ] Frequency  [  ] Dysuria    [  ]  Hematuria       Musculoskeletal:  [  ] None [  ] Back Pain	[  ] Body aches  [  ] Joint pain [ x ] Weakness    Neurological: [  ] None [  ]Dizziness  [  ]Visual Disturbance  [  ]Headaches   [ x ] Weakness          PHYSICAL EXAM:  Vital Signs Last 24 Hrs  T(C): 36.4 (22 Jun 2018 07:15), Max: 36.8 (22 Jun 2018 00:00)  T(F): 97.5 (22 Jun 2018 07:15), Max: 98.3 (22 Jun 2018 00:00)  HR: 116 (22 Jun 2018 07:15) (60 - 116)  BP: 131/58 (22 Jun 2018 07:15) (118/44 - 133/47)  BP(mean): --  RR: 19 (22 Jun 2018 07:15) (18 - 21)  SpO2: 98% (22 Jun 2018 07:15) (94% - 100%)    HEENT: [ x ] Wnl  [  ] Pharyngeal congestion    Neck:  [ x ] Supple  [  ]Lymphadenopathy  [ x ] No JVD   [  ] JVD  [  ] Masses   [  ] WNL    CHEST/Respiratory:  [  ]Clear to auscultation  [ x ] Rales on bases  [  ] Rhonchi   [  ] Wheezing     [  ] Chest Tenderness      Cardiovascular:  [ x ] Reg S1 S2   [  ] Irreg S1 S2   [ x ]No Murmur  [  ] +ve Murmurs  [  ]Systolic [  ]Diastolic      Abdomen:  [ x ] Soft  [  ] No tendrerness  [  ] Tenderness  [  ] Organomegaly  [ x ] Mild ABD Distention  [  ] Rigidity                       [ x ] No Regidity                       [ x ] No Rebound Tenderness  [  ] No Guarding Rigidity  [  ] Rebound Tenderness[  ] Guarding Rigidity                          [ x ]  +ve Bowel Sounds  [  ] Decreased Bowel Sounds    [  ] Absent Bowel Sounds                            Extremities: [ x ] No edema [  ] Edema  [  ] Clubbing   [  ] Cyanosis                         [ x ] No Tender Calf muscles  [  ] Tender Calf muscles                        [ x ] Palpable peripheral pulses    Neurological: [ x ] Awake  [ x ] Alert  [ x ] Oriented  x  2                           [  ] Confused  [ x ] Drowzy  [  ] repond to painful stimuli  [  ] Unresponsive    Skin:  [ x ] Intact [  ] Redness [  ] Thrombophlebitis  [  ] Rashes  [  ] Dry  [  ] Ulcers    Ortho:  [  ] Joint Swelling  [  ] Joint erythema [  ] Joint tenderness                [  ] Increased temp. to touch  [  ] DJD [ x ] WNL            LABS/DIAGNOSTIC TESTS                        8.5    11.0  )-----------( 204      ( 22 Jun 2018 06:32 )             28.1   06-22    146<H>  |  110<H>  |  35<H>  ----------------------------<  111<H>  3.3<L>   |  30  |  0.75    Ca    8.4      22 Jun 2018 06:32    ABG - ( 22 Jun 2018 01:09 )  pH, Arterial: 7.44  pH, Blood: x     /  pCO2: 40    /  pO2: 259   / HCO3: 27    / Base Excess: 2.8   /  SaO2: NR>99.6           CULTURES:     Culture - Blood (06.15.18 @ 09:37)    Specimen Source: .Blood Blood    Culture Results:   No growth to date.    Culture - Blood (06.15.18 @ 09:37)    Specimen Source: .Blood Blood    Culture Results:   No growth to date.      Culture - Urine (06.12.18 @ 23:37)    Specimen Source: .Urine Clean Catch (Midstream)    Culture Results:   No growth        RADIOLOGY:    EXAM:  XR CHEST PORTABLE ROUTINE 1V                            PROCEDURE DATE:  06/21/2018          INTERPRETATION:  AP semisupine chest on June 21, 2018 at 9:36 AM. Patient   is being followed for positive lung findings.    Heart enlargement and left-sided pacemaker again noted.    On June 20 there were central congestive findings and a right upper lobe   infiltrate. The central congestion has improved with mild residual. Right   upper lobe infiltrate is relatively unchanged.    IMPRESSION: Asabove.          EXAM:  CT ABDOMEN AND PELVIS                          EXAM:  CT CHEST                            PROCEDURE DATE:  06/16/2018          INTERPRETATION:  CT CHEST/ABDOMEN/PELVIS:     CLINICAL INFORMATION: Fever    COMPARISON: Chest x-ray of the previous day.    PROCEDURE:  Using multislice helical CT, 2.5 mm sections were obtained   from the thoracic inlet through the ischial tuberosities without the   administration of intravenous contrast. Oral contrast was not   administered.    Coronal and sagittal reformations were performed by  CT technologist and   made available for review.    FINDINGS:  The visualized structures of the lower neck are unremarkable.   The visualized aorta is of normal course and caliber. The heart is not   enlarged. There is no evidence of pericardial effusion.    There is no evidence of axillary, hilar, or mediastinal lymphadenopathy.   There is coronary artery calcification and atherosclerotic calcification   of the aorta.    No focal lung consolidations identified. No evidence of pneumothorax. No   pulmonary nodules identified. There are small bilateral pleural   effusions. There is passive atelectasis at the lung bases. There is   pulmonary vascular.      The liver is of normal contour. No evidence of focal hepatic lesion on   this nonenhanced examination. The gallbladder is present. No evidence of   intra or extrahepatic biliary dilatation.     The pancreas, spleen, and adrenal glands are grossly unremarkable. There   is a large nonobstructive stone in the midpole of the left kidney. There   is no evidence of hydronephrosis or perinephric stranding. No evidence of    nephrolithiasis.The bladder is unremarkable.    No evidence of lymphadenopathy utilizing CT size criteria. There is   aneurysmal dilatation of the infrarenal abdominal aorta with peripheral   calcification. The aneurysm measures up to 6.4 cm. It terminates above   the bifurcation. 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   There are bilateral inguinal hernias with fat. There are also   non obstructed small bowel loops within the right inguinal hernia..    There is no evidence of bowel obstruction. There is no evidence of   pneumoperitoneum or free fluid.    The visualized osseous structures demonstrate osteopenia and degenerative   changes. There is moderate compression fracture of L2 which is   indeterminate in age. There is severe compression fracture of T11 with   retropulsion into the spinal canal. This is also indeterminate in age..    IMPRESSION: Large infrarenal abdominal aortic aneurysm measuring up to   6.4 cm which is also noted on the plain films of lumbar spine of   9/8/2017. 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   Bilateral pleural effusions with mild bibasilar atelectasis. Mild   pulmonary vascular congestion.  Nonobstructive left renal calculus  Nonobstructing right inguinal hernia containing small bowel loops  Indeterminate age severe compression fracture T12 and moderate   compression fracture L2      Assessment and Recommendation:   89 yo male from home HHA 12 hours ambulates with walker pmh of BPH, arthiritis hypothyroidism, thoracic aneurysm  presented to the hospital with complaints of weakness, aphasia , poor resposiveness and unable to get up from toilet seat. history obtained by family at bedside. According to his wife, he had been "shaking" for most of the night. On 6/12/18 he awoke and was able to walk to the bathroom with his walker as usual. He sat on the toilet, and then had difficulty getting off the toiletThere was diminished verbal output. wife said he used his left arm to try to get up but she felt his right side was weak. his eyes were closed and he was not responsive. she called EMS. ROS positive for increased urinary frequency and buring for past couple of days. no chest pain , SOB cough.    in the ER Stroke code called on arrival at 7:15a, tele-stroke activated at 7:20a. VSS stable  EXCEPT tmax 102.4. ON TELE stroke patient had NIH scale of 15. b/l weakness greater on right side. code sepsis was also initiaated. labs pertinent for wbc count of q12 , ekg nsr left axis. lactate of 2.4. cxr : Mild pulmonary vascular congestion. Small right basilar nodular opacity. ct head: Moderate severe chronic small vessel ischemic changes in the frontal parietal white matter and small chronic appearing right frontal cortical infarct. TPA given at 9 am , and icu consult called.  code sepsis was also initiated. labs pertinent for wbc count of q12 , ekg nsr left axis. lactate of 2.4. cxr : Mild pulmonary vascular congestion. Small right basilar nodular opacity. started on rocephin and zmax    # fevers unclear etiology , bladder scan with only 42 cc of urine r/o worsening pneumonia.(improved)  # no objective evidence of bacterial infection as cause of fevers on adm . all cx neg /rvp swab neg /ct chest with no consolidation . pct slightly elevated , bnp elevated   # mild elevation of wbc improved and WBC is normal today 6/21/18 (patient is off ABX).   6/22/18 Patient is afebrile, but WBC is mildly elevated.    plan  maintain off abx.  bladder scan , if more than or equal to 200cc will need Nieto.  Cardiac management as per cardiologist.  S/P PPM as per cardio for follow up in 2 weeks.  Pane cultures, and follow up CXR for fever or continued increase of WBC.    Discussed with medical resident.

## 2018-06-22 NOTE — PROGRESS NOTE ADULT - ASSESSMENT
89 yo male from home HHA 12 hours ambulates with walker pmh of BPH, arthiritis hypothyroidism, Aortic aneurysm presented to the hospital with complaints of weakness, aphasia , poor resposiveness, now tachy-gisele on tele.  1.Gentle diuresis.  2.S/P PPM f/u with Dr. Wilson 2 weeks..  3.Cont low dose b blocker.  4.ID f/u appreciated, off abx.  5.Psych f/u.  6.GI and DVT prophylaxis.

## 2018-06-22 NOTE — PHYSICAL THERAPY INITIAL EVALUATION ADULT - CRITERIA FOR SKILLED THERAPEUTIC INTERVENTIONS
functional limitations in following categories/impairments found/risk reduction/prevention/rehab potential
functional limitations in following categories/impairments found/risk reduction/prevention

## 2018-06-22 NOTE — CHART NOTE - NSCHARTNOTEFT_GEN_A_CORE
Assessment:  85 y/o male from home HHA 12 hours ambulates with walker pmh of BPH, arthritis hypothyroidism, thoracic aneurysm. Admitted for CVA, downgraded from ICU. On dysphagia diet, s/p swallow evaluation.   RD visit requested by MD as family having nutrition related concerns. Pt visited, very weak, on enhanced supervision, spoke with wife at bedside    Factors impacting intake: [ ] none [ ] nausea  [ ] vomiting [ ] diarrhea [ ] constipation  [ X ]chewing problems [ X ] swallowing issues   [X ] other: dislikes puree food    Diet Presciption: Diet, Dysphagia 1 Pureed-Nectar Consistency Fluid:   DASH/TLC {Sodium & Cholesterol Restricted} (18 @ 20:32)    Intake:  poor intake, dislikes puree food, only taking applesauce, Mighty Shake, chocolate pudding, family requesting to have double portions of these at each meals, no hot plate as pt declining; prefers no Ensure as pt likes Mighty Shakes better than Ensure per wife.     Daily Weight in k.8 (2018 04:30)  Weight in k (2018 04:41)  Weight in k.4 (2018 12:04)  Weight in k.4 (2018 12:04)    % Weight Change: see above     Pertinent Medications: MEDICATIONS  (STANDING):  aspirin  chewable 81 milliGRAM(s) Oral daily  atorvastatin 40 milliGRAM(s) Oral at bedtime  docusate sodium 100 milliGRAM(s) Oral two times a day  furosemide   Injectable 20 milliGRAM(s) IV Push daily  heparin  Injectable 5000 Unit(s) SubCutaneous every 12 hours  levothyroxine 100 MICROGram(s) Oral daily  metoprolol tartrate 12.5 milliGRAM(s) Oral two times a day  potassium chloride    Tablet ER 40 milliEquivalent(s) Oral every 4 hours  risperiDONE   Tablet 1 milliGRAM(s) Oral at bedtime  senna 2 Tablet(s) Oral at bedtime  tamsulosin 0.4 milliGRAM(s) Oral at bedtime    MEDICATIONS  (PRN):    Pertinent Labs:  Na146 mmol/L<H> Glu 111 mg/dL<H> K+ 3.3 mmol/L<L> Cr  0.75 mg/dL BUN 35 mg/dL<H> 06-18 Phos 2.8 mg/dL  Alb 2.2 g/dL<L>  [id number] 5.2 %  Chol 105 mg/dL LDL 47 mg/dL HDL 43 mg/dL Trig 76 mg/dL     CAPILLARY BLOOD GLUCOSE      Skin:     Estimated Needs:   [ X ] no change since previous assessment  [ ] recalculated:     Previous Nutrition Diagnosis:   [ X ] Inadequate Energy Intake [ ]Inadequate Oral Intake [ ] Excessive Energy Intake   [ ] Underweight [ ] Increased Nutrient Needs [ ] Overweight/Obesity  [ ] Swallowing Difficult   [ ] Altered GI Function [ ] Unintended Weight Loss [ ] Food & Nutrition Related Knowledge Deficit [ ] Malnutrition   [ ] Not Ready for Diet/Life Style Changes     Nutrition Diagnosis is [ X ] ongoing  [ ] Improving   [ ] resolved [ ] not applicable     New Nutrition Diagnosis: [ ] not applicable       Interventions:   Recommend  [ ] Change Diet To:  [ ] Nutrition Supplement  [ ] Nutrition Support  [ X ] Other: Provide food choices within diet Rx as available/updated; Nursing to continue feeding assistance and encouragement, aspiration precaution     Monitoring and Evaluation:   [ X ] PO intake [ x ] Tolerance to diet prescription [ x ] weights [ x ] labs[ x ] follow up per protocol  [ ] other: Assessment:  85 y/o male from home HHA 12 hours ambulates with walker pmh of BPH, arthritis hypothyroidism, thoracic aneurysm. Admitted for CVA, downgraded from ICU. On dysphagia diet, s/p swallow evaluation with dysphagia puree nectar thicken liquid recommended 18   RD visit requested by MD as family having nutrition related concerns. Pt visited, very weak, on enhanced supervision, spoke with wife at bedside    Factors impacting intake: [ ] none [ ] nausea  [ ] vomiting [ ] diarrhea [ ] constipation  [ X ]chewing problems [ X ] swallowing issues   [X ] other: dislikes puree food    Diet Presciption: Diet, Dysphagia 1 Pureed-Nectar Consistency Fluid:   DASH/TLC {Sodium & Cholesterol Restricted} (18 @ 20:32)    Intake:  poor intake, dislikes puree food, only taking applesauce, Mighty Shake, chocolate pudding, family requesting to have double portions of these at each meals, no hot plate as pt declining; prefers no Ensure as pt likes Mighty Shakes better than Ensure per wife.     Daily Weight in k.8 (2018 04:30)  Weight in k (2018 04:41)  Weight in k.4 (2018 12:04)  Weight in k.4 (2018 12:04)    % Weight Change: see above     Pertinent Medications: MEDICATIONS  (STANDING):  aspirin  chewable 81 milliGRAM(s) Oral daily  atorvastatin 40 milliGRAM(s) Oral at bedtime  docusate sodium 100 milliGRAM(s) Oral two times a day  furosemide   Injectable 20 milliGRAM(s) IV Push daily  heparin  Injectable 5000 Unit(s) SubCutaneous every 12 hours  levothyroxine 100 MICROGram(s) Oral daily  metoprolol tartrate 12.5 milliGRAM(s) Oral two times a day  potassium chloride    Tablet ER 40 milliEquivalent(s) Oral every 4 hours  risperiDONE   Tablet 1 milliGRAM(s) Oral at bedtime  senna 2 Tablet(s) Oral at bedtime  tamsulosin 0.4 milliGRAM(s) Oral at bedtime    MEDICATIONS  (PRN):    Pertinent Labs:  Na146 mmol/L<H> Glu 111 mg/dL<H> K+ 3.3 mmol/L<L> Cr  0.75 mg/dL BUN 35 mg/dL<H>  Phos 2.8 mg/dL  Alb 2.2 g/dL<L>  [id number] 5.2 %  Chol 105 mg/dL LDL 47 mg/dL HDL 43 mg/dL Trig 76 mg/dL     CAPILLARY BLOOD GLUCOSE      Skin: no pressure ulcer noted     Estimated Needs:   [ X ] no change since previous assessment  [ ] recalculated:     Previous Nutrition Diagnosis:   [ X ] Inadequate Energy Intake [ ]Inadequate Oral Intake [ ] Excessive Energy Intake   [ ] Underweight [ ] Increased Nutrient Needs [ ] Overweight/Obesity  [ ] Swallowing Difficult   [ ] Altered GI Function [ ] Unintended Weight Loss [ ] Food & Nutrition Related Knowledge Deficit [ ] Malnutrition   [ ] Not Ready for Diet/Life Style Changes     Nutrition Diagnosis is [ X ] ongoing  [ ] Improving   [ ] resolved [ ] not applicable     New Nutrition Diagnosis: [ ] not applicable       Interventions:   Recommend  [ ] Change Diet To:  [ ] Nutrition Supplement  [ ] Nutrition Support  [ X ] Other: Provide food choices within diet Rx as available/updated; Nursing to continue feeding assistance and encouragement, aspiration precaution                    Swallow re-evaluation as medically feasible    Monitoring and Evaluation:   [ X ] PO intake [ x ] Tolerance to diet prescription [ x ] weights [ x ] labs[ x ] follow up per protocol  [ ] other: Assessment:  87 y/o male from home HHA 12 hours ambulates with walker pmh of BPH, arthritis hypothyroidism, thoracic aneurysm. Admitted for CVA, downgraded from ICU. On dysphagia diet, s/p swallow evaluation with dysphagia puree nectar thicken liquid recommended 18   RD visit requested by MD as family having nutrition related concerns. Pt visited, very weak, on enhanced supervision, spoke with wife at bedside    Factors impacting intake: [ ] none [ ] nausea  [ ] vomiting [ ] diarrhea [ ] constipation  [ X ]chewing problems [ X ] swallowing issues   [X ] other: dislikes puree food    Diet Presciption: Diet, Dysphagia 1 Pureed-Nectar Consistency Fluid:   DASH/TLC {Sodium & Cholesterol Restricted} (18 @ 20:32)    Intake:  poor intake, dislikes puree food, only taking applesauce, Mighty Shake, chocolate pudding, family requesting to have double portions of these at each meals, no hot plate as pt declining; prefers no Ensure as pt likes Mighty Shakes better than Ensure per wife.     Daily Weight in k.8 (2018 04:30)  Weight in k (2018 04:41)  Weight in k.4 (2018 12:04)  Weight in k.4 (2018 12:04)    % Weight Change: see above     Pertinent Medications: MEDICATIONS  (STANDING):  aspirin  chewable 81 milliGRAM(s) Oral daily  atorvastatin 40 milliGRAM(s) Oral at bedtime  docusate sodium 100 milliGRAM(s) Oral two times a day  furosemide   Injectable 20 milliGRAM(s) IV Push daily  heparin  Injectable 5000 Unit(s) SubCutaneous every 12 hours  levothyroxine 100 MICROGram(s) Oral daily  metoprolol tartrate 12.5 milliGRAM(s) Oral two times a day  potassium chloride    Tablet ER 40 milliEquivalent(s) Oral every 4 hours  risperiDONE   Tablet 1 milliGRAM(s) Oral at bedtime  senna 2 Tablet(s) Oral at bedtime  tamsulosin 0.4 milliGRAM(s) Oral at bedtime    MEDICATIONS  (PRN):    Pertinent Labs:  Na146 mmol/L<H> Glu 111 mg/dL<H> K+ 3.3 mmol/L<L> Cr  0.75 mg/dL BUN 35 mg/dL<H>  Phos 2.8 mg/dL  Alb 2.2 g/dL<L>  [id number] 5.2 %  Chol 105 mg/dL LDL 47 mg/dL HDL 43 mg/dL Trig 76 mg/dL     CAPILLARY BLOOD GLUCOSE      Skin: no pressure ulcer noted     Estimated Needs:   [ X ] no change since previous assessment  [ ] recalculated:     Previous Nutrition Diagnosis:   [ X ] Inadequate Energy Intake [ ]Inadequate Oral Intake [ ] Excessive Energy Intake   [ ] Underweight [ ] Increased Nutrient Needs [ ] Overweight/Obesity  [ ] Swallowing Difficult   [ ] Altered GI Function [ ] Unintended Weight Loss [ ] Food & Nutrition Related Knowledge Deficit [ ] Malnutrition   [ ] Not Ready for Diet/Life Style Changes     Nutrition Diagnosis is [ X ] ongoing  [ ] Improving   [ ] resolved [ ] not applicable     New Nutrition Diagnosis: [ ] not applicable       Interventions:   Recommend  [ ] Change Diet To:  [ ] Nutrition Supplement  [ ] Nutrition Support  [ X ] Other:  Continue diet Rx as ordered                    Provide food choices within diet Rx as available/updated; Nursing to continue feeding assistance and encouragement, aspiration precaution                    Swallow re-evaluation as medically feasible    Monitoring and Evaluation:   [ X ] PO intake [ x ] Tolerance to diet prescription [ x ] weights [ x ] labs[ x ] follow up per protocol  [ ] other: Assessment:  87 y/o male from home HHA 12 hours ambulates with walker pmh of BPH, arthritis hypothyroidism, thoracic aneurysm. Admitted for CVA, downgraded from ICU. On dysphagia diet, s/p swallow evaluation with dysphagia puree nectar thicken liquid recommended 18   RD visit requested by MD as family having nutrition related concerns. Pt visited, very weak, on enhanced supervision, spoke with wife at bedside    Factors impacting intake: [ ] none [ ] nausea  [ ] vomiting [ ] diarrhea [ ] constipation  [ X ]chewing problems [ X ] swallowing issues   [X ] other: dislikes puree food    Diet Presciption: Diet, Dysphagia 1 Pureed-Nectar Consistency Fluid:   DASH/TLC {Sodium & Cholesterol Restricted} (18 @ 20:32)    Intake:  poor intake, dislikes puree food, only taking applesauce, Mighty Shake, chocolate pudding, family requesting to have double portions of these at each meals, no hot plate as pt declining; prefers no Ensure as pt likes Mighty Shakes better than Ensure per wife.     Daily Weight in k.8 (2018 04:30)  Weight in k (2018 04:41)  Weight in k.4 (2018 12:04)  Weight in k.4 (2018 12:04)    % Weight Change: see above     Pertinent Medications: MEDICATIONS  (STANDING):  aspirin  chewable 81 milliGRAM(s) Oral daily  atorvastatin 40 milliGRAM(s) Oral at bedtime  docusate sodium 100 milliGRAM(s) Oral two times a day  furosemide   Injectable 20 milliGRAM(s) IV Push daily  heparin  Injectable 5000 Unit(s) SubCutaneous every 12 hours  levothyroxine 100 MICROGram(s) Oral daily  metoprolol tartrate 12.5 milliGRAM(s) Oral two times a day  potassium chloride    Tablet ER 40 milliEquivalent(s) Oral every 4 hours  risperiDONE   Tablet 1 milliGRAM(s) Oral at bedtime  senna 2 Tablet(s) Oral at bedtime  tamsulosin 0.4 milliGRAM(s) Oral at bedtime    MEDICATIONS  (PRN):    Pertinent Labs:  Na146 mmol/L<H> Glu 111 mg/dL<H> K+ 3.3 mmol/L<L> Cr  0.75 mg/dL BUN 35 mg/dL<H>  Phos 2.8 mg/dL  Alb 2.2 g/dL<L>  [id number] 5.2 %  Chol 105 mg/dL LDL 47 mg/dL HDL 43 mg/dL Trig 76 mg/dL     CAPILLARY BLOOD GLUCOSE      Skin: no pressure ulcer noted     Estimated Needs:   [ X ] no change since previous assessment  [ ] recalculated:     Previous Nutrition Diagnosis:   [ X ] Inadequate Energy Intake [ ]Inadequate Oral Intake [ ] Excessive Energy Intake   [ ] Underweight [ ] Increased Nutrient Needs [ ] Overweight/Obesity  [ ] Swallowing Difficult   [ ] Altered GI Function [ ] Unintended Weight Loss [ ] Food & Nutrition Related Knowledge Deficit [ ] Malnutrition   [ ] Not Ready for Diet/Life Style Changes     Nutrition Diagnosis is [ X ] ongoing  [ ] Improving   [ ] resolved [ ] not applicable     New Nutrition Diagnosis: [ ] not applicable       Interventions:   Recommend  [ ] Change Diet To:  [ ] Nutrition Supplement  [ ] Nutrition Support  [ X ] Other:  Continue diet Rx as ordered                    Provide food choices within diet Rx as available/updated;                   Nursing to continue feeding assistance and encouragement, aspiration precaution                    Swallow re-evaluation as medically feasible    Monitoring and Evaluation:   [ X ] PO intake [ x ] Tolerance to diet prescription [ x ] weights [ x ] labs[ x ] follow up per protocol  [ ] other:

## 2018-06-22 NOTE — PHYSICAL THERAPY INITIAL EVALUATION ADULT - GENERAL OBSERVATIONS, REHAB EVAL
Patient was seen in supine on bed,+IV line.Patient sedated at this time secondary to agitation when awake. Rass score of -4
Pt seen for PT re-eval found sitting on the chair w/family @ bedside, (+) B/L wrist restraints, (L) sh in sling, precautions observed s/p PPM.

## 2018-06-22 NOTE — PHYSICAL THERAPY INITIAL EVALUATION ADULT - LIVES WITH, PROFILE
per chart info,patient lives with his wife;has HHA 12 hrs/day
Per note; pt lives with his wife with HHA x 12 hours

## 2018-06-22 NOTE — PROGRESS NOTE ADULT - ASSESSMENT
87 y/o male from home HHA 12 hours ambulates with walker pmh of BPH, arthritis hypothyroidism, thoracic aneurysm presented to the hospital with complaints of weakness, aphasia , poor responsiveness and unable to get up from toilet seat.  Admitted to ICU for concern of CVA s/p TPA monitoring and sepsis likely secondary to pna vs UTI. Managed on the floor for the above mentioned symptoms and later transferred to the Telemetry floor due episodes of Bradycardia. patient had a pacemaker placement yesterday.  Family has also refused any intervention for the AAA in the past and now. Patient is a at very high risk for any vascular procedures.  Will get PT consult and likely d/c to [facility] .

## 2018-06-22 NOTE — PHYSICAL THERAPY INITIAL EVALUATION ADULT - IMPAIRMENTS FOUND, PT EVAL
ROM/muscle strength/gross motor/aerobic capacity/endurance/gait, locomotion, and balance
muscle strength/poor safety awareness/aerobic capacity/endurance/cognitive impairment/gait, locomotion, and balance

## 2018-06-22 NOTE — PROGRESS NOTE ADULT - SUBJECTIVE AND OBJECTIVE BOX
==================PGY 1 Note===================   Discussed with supervising resident and primary attending    ================CHIEF COMPLAINT===============  Patient is a 89y old  Male who presents with a chief complaint of weakness. aphasia (12 Jun 2018 10:18)        =========INTERVAL HPI/OVERNIGHT EVENTS=========  Offers no new complaints      ============CURRENT MEDICATIONS===============    MEDICATIONS  (STANDING):  aspirin  chewable 81 milliGRAM(s) Oral daily  atorvastatin 40 milliGRAM(s) Oral at bedtime  docusate sodium 100 milliGRAM(s) Oral two times a day  furosemide   Injectable 20 milliGRAM(s) IV Push daily  heparin  Injectable 5000 Unit(s) SubCutaneous every 12 hours  levothyroxine 100 MICROGram(s) Oral daily  metoprolol tartrate 12.5 milliGRAM(s) Oral two times a day  potassium chloride    Tablet ER 40 milliEquivalent(s) Oral every 4 hours  risperiDONE   Tablet 1 milliGRAM(s) Oral at bedtime  senna 2 Tablet(s) Oral at bedtime  tamsulosin 0.4 milliGRAM(s) Oral at bedtime    MEDICATIONS  (PRN):        ============REVIEW OF SYSTEMS==================    CONSTITUTIONAL: No fever  EYES: no acute visual disturbances  NECK: No pain or stiffness  RESPIRATORY: No cough; No shortness of breath  CARDIOVASCULAR: No chest pain, no palpitations  GASTROINTESTINAL: No pain. No nausea or vomiting; No diarrhea   NEUROLOGICAL: No headache or numbness, no tremors  MUSCULOSKELETAL: No joint pain, no muscle pain  GENITOURINARY: no dysuria, no frequency, no hesitancy  PSYCHIATRY: no depression , no anxiety  ALL OTHER  ROS negative      ================VITALS SIGNS=====================  Vital Signs Last 24 Hrs  T(C): 36.4 (22 Jun 2018 07:15), Max: 36.8 (22 Jun 2018 00:00)  T(F): 97.5 (22 Jun 2018 07:15), Max: 98.3 (22 Jun 2018 00:00)  HR: 71 (22 Jun 2018 11:45) (62 - 116)  BP: 127/48 (22 Jun 2018 11:45) (127/48 - 133/47)  BP(mean): --  RR: 18 (22 Jun 2018 09:52) (18 - 21)  SpO2: 95% (22 Jun 2018 12:29) (86% - 100%)    ===============PHYSICAL EXAM====================    GENERAL: NAD  HEENT: Normocephalic;  conjunctivae and sclerae clear; moist mucous membranes;   NECK : supple  CHEST/LUNG: Mild crackles bibasilar  HEART: S1 S2  regular; no murmurs, gallops or rubs  ABDOMEN: Soft, Nontender, Nondistended; Bowel sounds present  EXTREMITIES: no cyanosis; no edema; no calf tenderness  SKIN: warm and dry; no rash  NERVOUS SYSTEM:  Awake and alert;    ==============LABORATORIES======================  LABS:                        8.5    11.0  )-----------( 204      ( 22 Jun 2018 06:32 )             28.1     06-22    146<H>  |  110<H>  |  35<H>  ----------------------------<  111<H>  3.3<L>   |  30  |  0.75    Ca    8.4      22 Jun 2018 06:32          CAPILLARY BLOOD GLUCOSE          =============INPUTS/OUPUTS=====================    06-21-18 @ 07:01  -  06-22-18 @ 07:00  --------------------------------------------------------  IN: 350 mL / OUT: 0 mL / NET: 350 mL          RADIOLOGY & ADDITIONAL TESTS:    Imaging Personally Reviewed:  YES    Consultant(s) Notes Reviewed:   YES    Care Discussed with Consultants : YES    Plan of care was discussed with patient  and /or primary care giver; all questions and concerns were addressed and care was aligned with patient's wishes. Time was allowed for questions that were answered to the best of my abilities

## 2018-06-22 NOTE — PHYSICAL THERAPY INITIAL EVALUATION ADULT - FOLLOWS COMMANDS/ANSWERS QUESTIONS, REHAB EVAL
able to follow single-step instructions/with verbal and tactile stimuli, (+) delayed response
unable to assess as patient was sedated

## 2018-06-22 NOTE — PROGRESS NOTE ADULT - PROBLEM SELECTOR PLAN 3
Large infrarenal abdominal aortic aneurysm measuring up to   6.4 cm which is also noted on the plain films of lumbar spine of 9/8/2017  Vascular Surgery Dr. Bolanos  - Previously made decision to avoid AAA surgery   - Cont med mgmt  - family refuse any surgery for AAA. and patient is not a surgical candidate and the family does not want any invasive measures and understand the rupture of the AAA.

## 2018-06-23 ENCOUNTER — TRANSCRIPTION ENCOUNTER (OUTPATIENT)
Age: 83
End: 2018-06-23

## 2018-06-23 RX ORDER — SENNA PLUS 8.6 MG/1
2 TABLET ORAL
Qty: 0 | Refills: 0 | COMMUNITY
Start: 2018-06-23

## 2018-06-23 RX ORDER — LEVOTHYROXINE SODIUM 125 MCG
0 TABLET ORAL
Qty: 90 | Refills: 0 | COMMUNITY

## 2018-06-23 RX ORDER — POTASSIUM CHLORIDE 20 MEQ
20 PACKET (EA) ORAL DAILY
Qty: 0 | Refills: 0 | Status: COMPLETED | OUTPATIENT
Start: 2018-06-23 | End: 2018-06-24

## 2018-06-23 RX ORDER — DOCUSATE SODIUM 100 MG
1 CAPSULE ORAL
Qty: 0 | Refills: 0 | COMMUNITY
Start: 2018-06-23

## 2018-06-23 RX ORDER — LEVOTHYROXINE SODIUM 125 MCG
1 TABLET ORAL
Qty: 0 | Refills: 0 | COMMUNITY
Start: 2018-06-23

## 2018-06-23 RX ORDER — METOPROLOL TARTRATE 50 MG
0.5 TABLET ORAL
Qty: 30 | Refills: 0 | OUTPATIENT
Start: 2018-06-23 | End: 2018-07-22

## 2018-06-23 RX ORDER — TAMSULOSIN HYDROCHLORIDE 0.4 MG/1
1 CAPSULE ORAL
Qty: 0 | Refills: 0 | COMMUNITY
Start: 2018-06-23

## 2018-06-23 RX ORDER — POTASSIUM CHLORIDE 20 MEQ
20 PACKET (EA) ORAL
Qty: 0 | Refills: 0 | Status: DISCONTINUED | OUTPATIENT
Start: 2018-06-23 | End: 2018-06-23

## 2018-06-23 RX ORDER — FUROSEMIDE 40 MG
1 TABLET ORAL
Qty: 30 | Refills: 0 | OUTPATIENT
Start: 2018-06-23 | End: 2018-07-22

## 2018-06-23 RX ORDER — ASPIRIN/CALCIUM CARB/MAGNESIUM 324 MG
1 TABLET ORAL
Qty: 0 | Refills: 0 | COMMUNITY
Start: 2018-06-23

## 2018-06-23 RX ORDER — TAMSULOSIN HYDROCHLORIDE 0.4 MG/1
0 CAPSULE ORAL
Qty: 180 | Refills: 0 | COMMUNITY

## 2018-06-23 RX ORDER — TAMSULOSIN HYDROCHLORIDE 0.4 MG/1
0 CAPSULE ORAL
Qty: 0 | Refills: 0 | COMMUNITY

## 2018-06-23 RX ORDER — FINASTERIDE 5 MG/1
0 TABLET, FILM COATED ORAL
Qty: 90 | Refills: 0 | COMMUNITY

## 2018-06-23 RX ORDER — ATORVASTATIN CALCIUM 80 MG/1
1 TABLET, FILM COATED ORAL
Qty: 0 | Refills: 0 | COMMUNITY
Start: 2018-06-23

## 2018-06-23 RX ORDER — RISPERIDONE 4 MG/1
1 TABLET ORAL
Qty: 0 | Refills: 0 | COMMUNITY
Start: 2018-06-23

## 2018-06-23 RX ORDER — LEVOTHYROXINE SODIUM 125 MCG
0 TABLET ORAL
Qty: 0 | Refills: 0 | COMMUNITY

## 2018-06-23 RX ORDER — OXYBUTYNIN CHLORIDE 5 MG
0 TABLET ORAL
Qty: 0 | Refills: 0 | COMMUNITY

## 2018-06-23 RX ORDER — TRAMADOL HYDROCHLORIDE 50 MG/1
0 TABLET ORAL
Qty: 60 | Refills: 0 | COMMUNITY

## 2018-06-23 RX ORDER — FINASTERIDE 5 MG/1
0 TABLET, FILM COATED ORAL
Qty: 0 | Refills: 0 | COMMUNITY

## 2018-06-23 RX ADMIN — Medication 20 MILLIGRAM(S): at 06:26

## 2018-06-23 RX ADMIN — SENNA PLUS 2 TABLET(S): 8.6 TABLET ORAL at 22:04

## 2018-06-23 RX ADMIN — Medication 81 MILLIGRAM(S): at 12:50

## 2018-06-23 RX ADMIN — Medication 20 MILLIEQUIVALENT(S): at 18:30

## 2018-06-23 RX ADMIN — RISPERIDONE 1 MILLIGRAM(S): 4 TABLET ORAL at 22:04

## 2018-06-23 RX ADMIN — ATORVASTATIN CALCIUM 40 MILLIGRAM(S): 80 TABLET, FILM COATED ORAL at 22:04

## 2018-06-23 RX ADMIN — TAMSULOSIN HYDROCHLORIDE 0.4 MILLIGRAM(S): 0.4 CAPSULE ORAL at 22:04

## 2018-06-23 RX ADMIN — HEPARIN SODIUM 5000 UNIT(S): 5000 INJECTION INTRAVENOUS; SUBCUTANEOUS at 06:26

## 2018-06-23 RX ADMIN — Medication 100 MILLIGRAM(S): at 18:30

## 2018-06-23 RX ADMIN — HEPARIN SODIUM 5000 UNIT(S): 5000 INJECTION INTRAVENOUS; SUBCUTANEOUS at 18:30

## 2018-06-23 NOTE — DISCHARGE NOTE ADULT - MEDICATION SUMMARY - MEDICATIONS TO TAKE
I will START or STAY ON the medications listed below when I get home from the hospital:    aspirin 81 mg oral tablet, chewable  -- 1 tab(s) by mouth once a day  -- Indication: For CVD    tamsulosin 0.4 mg oral capsule  -- 1 cap(s) by mouth once a day (at bedtime)  -- Indication: For BPH (benign prostatic hyperplasia)    atorvastatin 40 mg oral tablet  -- 1 tab(s) by mouth once a day (at bedtime)  -- Indication: For HLD    risperiDONE 1 mg oral tablet  -- 1 tab(s) by mouth once a day (at bedtime)  -- Indication: For Agitation    metoprolol tartrate 25 mg oral tablet  -- 0.5 tab(s) by mouth 2 times a day   -- It is very important that you take or use this exactly as directed.  Do not skip doses or discontinue unless directed by your doctor.  May cause drowsiness.  Alcohol may intensify this effect.  Use care when operating dangerous machinery.  Some non-prescription drugs may aggravate your condition.  Read all labels carefully.  If a warning appears, check with your doctor before taking.  Take with food or milk.  This drug may impair the ability to drive or operate machinery.  Use care until you become familiar with its effects.    -- Indication: For Htn    Lasix 20 mg oral tablet  -- 1 tab(s) by mouth once a day   -- Avoid prolonged or excessive exposure to direct and/or artificial sunlight while taking this medication.  It is very important that you take or use this exactly as directed.  Do not skip doses or discontinue unless directed by your doctor.  It may be advisable to drink a full glass orange juice or eat a banana daily while taking this medication.    -- Indication: For Htn    docusate sodium 100 mg oral capsule  -- 1 cap(s) by mouth 2 times a day  -- Indication: For Constipation     senna oral tablet  -- 2 tab(s) by mouth once a day (at bedtime)  -- Indication: For Constipation     levothyroxine 100 mcg (0.1 mg) oral tablet  -- 1 tab(s) by mouth once a day  -- Indication: For Hypothyroidism

## 2018-06-23 NOTE — DISCHARGE NOTE ADULT - PLAN OF CARE
To treat the underlying medical condition stable Managed initially in the ICU with s/p TPA. Initial CT of head negative. MRI with chronic infarcts  Will need to continue asa and statin. Resolved  Leukocytosis resolved  Likely secondary to PNA but appears to be more CHF / increased volume on CT  cultures negative  No need for antibiotics, completed a course during hospitalization Continue with Flomax as indicated in medication reconciliation. Follow up with your primary care physician within one week of discharge to inform of your recent hospitalization. continue with synthroid. arge infrarenal abdominal aortic aneurysm measuring up to   6.4 cm which is also noted on the plain films of lumbar spine of 9/8/2017  Vascular Surgery Dr. Bolanos. Previously made decision to avoid AAA surgery   Will continue cont med management. Family refuse any surgery for AAA and patient is not a surgical candidate and the family does not want any invasive measures and understand the rupture of the AAA. Stable Hemoglobin during admission   s/p TPA  occult stool - negative.

## 2018-06-23 NOTE — DISCHARGE NOTE ADULT - ADDITIONAL INSTRUCTIONS
Follow up with Primary Care Physician within one week after discharge to inform of your recent hospitalization. Patient/Family was oriented on instruction.   Patient also need to follow with Dr Umanzor and Dr Wilson  [facility]  [address] [place] [address] [phone]

## 2018-06-23 NOTE — DISCHARGE NOTE ADULT - CARE PROVIDER_API CALL
Alfred Isaacs (DO), Medicine  [address] [place] [address]  [address]  [place] [address]  Phone: [phone]  Fax: [phone]

## 2018-06-23 NOTE — PROGRESS NOTE ADULT - ASSESSMENT
85 y/o male from home with  HHA 12 hours ambulates with walker pmh of BPH, arthritis hypothyroidism, thoracic aneurysm presented to the hospital with complaints of weakness, aphasia , poor responsiveness and unable to get up from toilet seat.  Admitted to ICU for concern of CVA s/p TPA monitoring and sepsis likely secondary to pna vs UTI. Managed on the floor for the above mentioned symptoms and later transferred to the Telemetry floor due episodes of Bradycardia. Patient had a pacemaker placement.  Family has also refused any intervention for the AAA in the past and now. Patient is a at very high risk for any vascular procedures.  Physical therapy evaluation recommended sub acute Rehabilitation but family opted for assisted living.         PENDING DISCHARGE PLANNING TO [facility] IF APPROVED

## 2018-06-23 NOTE — PROGRESS NOTE ADULT - SUBJECTIVE AND OBJECTIVE BOX
CHIEF COMPLAINT:Patient is a 89y old  Male who presents with a chief complaint of weakness. aphasia .Pt appears comfortable.    	  REVIEW OF SYSTEMS:  unable to obtain    PHYSICAL EXAM:  T(C): 36.7 (06-23-18 @ 04:57), Max: 36.8 (06-22-18 @ 19:20)  HR: 74 (06-23-18 @ 07:20) (67 - 81)  BP: 129/68 (06-23-18 @ 07:20) (102/45 - 131/92)  RR: 18 (06-23-18 @ 07:20) (18 - 18)  SpO2: 96% (06-23-18 @ 07:20) (86% - 97%)      Appearance: Normal	  HEENT:   Normal oral mucosa, PERRL, EOMI	  Lymphatic: No lymphadenopathy  Cardiovascular: Normal S1 S2, No JVD, No murmurs, No edema  Respiratory: Lungs clear to auscultation	  Gastrointestinal:  Soft, Non-tender, + BS	  Skin: No rashes, No ecchymoses, No cyanosis	  Extremities: Normal range of motion, No clubbing, cyanosis or edema  Vascular: Peripheral pulses palpable 2+ bilaterally    MEDICATIONS  (STANDING):  aspirin  chewable 81 milliGRAM(s) Oral daily  atorvastatin 40 milliGRAM(s) Oral at bedtime  docusate sodium 100 milliGRAM(s) Oral two times a day  furosemide   Injectable 20 milliGRAM(s) IV Push daily  heparin  Injectable 5000 Unit(s) SubCutaneous every 12 hours  levothyroxine 100 MICROGram(s) Oral daily  metoprolol tartrate 12.5 milliGRAM(s) Oral two times a day  potassium chloride    Tablet ER 20 milliEquivalent(s) Oral every 2 hours  risperiDONE   Tablet 1 milliGRAM(s) Oral at bedtime  senna 2 Tablet(s) Oral at bedtime  tamsulosin 0.4 milliGRAM(s) Oral at bedtime        	  LABS:	 	                       8.5    11.0  )-----------( 204      ( 22 Jun 2018 06:32 )             28.1     06-22    146<H>  |  110<H>  |  35<H>  ----------------------------<  111<H>  3.3<L>   |  30  |  0.75    Ca    8.4      22 Jun 2018 06:32      proBNP: Serum Pro-Brain Natriuretic Peptide: 7688 pg/mL (06-16 @ 07:37)    Lipid Profile: Cholesterol 105  LDL 47  HDL 43  TG 76  Cholesterol 118  LDL 55  HDL 48  TG 76    HgA1c: Hemoglobin A1C, Whole Blood: 5.2 % (06-13 @ 18:32)    TSH: Thyroid Stimulating Hormone, Serum: 1.76 uU/mL (06-13 @ 12:14)

## 2018-06-23 NOTE — PROGRESS NOTE ADULT - SUBJECTIVE AND OBJECTIVE BOX
Id attending covering for Dr. Ruiz.  Chart is reviewed and patient is examined.     MEDICATIONS  (STANDING):  aspirin  chewable 81 milliGRAM(s) Oral daily  atorvastatin 40 milliGRAM(s) Oral at bedtime  docusate sodium 100 milliGRAM(s) Oral two times a day  furosemide   Injectable 20 milliGRAM(s) IV Push daily  heparin  Injectable 5000 Unit(s) SubCutaneous every 12 hours  levothyroxine 100 MICROGram(s) Oral daily  metoprolol tartrate 12.5 milliGRAM(s) Oral two times a day  potassium chloride    Tablet ER 20 milliEquivalent(s) Oral every 2 hours  risperiDONE   Tablet 1 milliGRAM(s) Oral at bedtime  senna 2 Tablet(s) Oral at bedtime  tamsulosin 0.4 milliGRAM(s) Oral at bedtime    MEDICATIONS  (PRN):      Allergies:  Allergies    No Known Allergies    Intolerances        ROS  [  ] UNABLE TO ELICIT    General:  [  ] None  [  ] Fever  [  ] Chills  [ x ] Malaise    Skin:  [ x ] None [  ] Rash  [  ] Wound  [  ] Ulcer    HEENT:  [ x ] None  [  ] Sore Throat  [  ] Nasal congestion/ runny nose  [  ] Photophobia [  ] Neck pain      Chest:  [  ] None   [  ] SOB  [ x ] Cough  [  ] None    Cardiovascular:   [ x ] None  [  ] CP  [  ] Palpitation    Gastrointestinal:  [ x ] None  [  ] Abd pain   [  ] Nausea    [  ] Vomiting   [  ] Diarrhea	     Genitourinary:  [ x ] None [  ] Polyuria   [  ] Urgency  [  ] Frequency  [  ] Dysuria    [  ]  Hematuria       Musculoskeletal:  [  ] None [  ] Back Pain	[  ] Body aches  [  ] Joint pain [ x ] Weakness    Neurological: [  ] None [  ]Dizziness  [  ]Visual Disturbance  [  ]Headaches   [ x ] Weakness          PHYSICAL EXAM:  Vital Signs Last 24 Hrs  T(C): 36.4 (23 Jun 2018 11:17), Max: 36.8 (22 Jun 2018 19:20)  T(F): 97.5 (23 Jun 2018 11:17), Max: 98.2 (22 Jun 2018 19:20)  HR: 67 (23 Jun 2018 11:17) (67 - 81)  BP: 116/56 (23 Jun 2018 11:17) (102/45 - 131/92)  BP(mean): --  RR: 17 (23 Jun 2018 11:17) (17 - 18)  SpO2: 100% (23 Jun 2018 11:17) (86% - 100%)    HEENT: [ x ] Wnl  [  ] Pharyngeal congestion    Neck:  [ x ] Supple  [  ]Lymphadenopathy  [ x ] No JVD   [  ] JVD  [  ] Masses   [  ] WNL    CHEST/Respiratory:  [  ]Clear to auscultation  [ x ] Rales on bases  [  ] Rhonchi   [  ] Wheezing     [ x ] Chest PPM left pectoral area      Cardiovascular:  [ x ] Reg S1 S2   [  ] Irreg S1 S2   [ x ]No Murmur  [  ] +ve Murmurs  [  ]Systolic [  ]Diastolic      Abdomen:  [ x ] Soft  [  ] No tendrerness  [  ] Tenderness  [  ] Organomegaly  [ x ] Mild ABD Distention  [  ] Rigidity                       [ x ] No Regidity                       [ x ] No Rebound Tenderness  [  ] No Guarding Rigidity  [  ] Rebound Tenderness[  ] Guarding Rigidity                          [ x ]  +ve Bowel Sounds  [  ] Decreased Bowel Sounds    [  ] Absent Bowel Sounds                            Extremities: [ x ] No edema [  ] Edema  [  ] Clubbing   [  ] Cyanosis                         [ x ] No Tender Calf muscles  [  ] Tender Calf muscles                        [ x ] Palpable peripheral pulses    Neurological: [ x ] Awake  [ x ] Alert  [ x ] Oriented  x  2                           [  ] Confused  [ x ] Drowzy  [  ] repond to painful stimuli  [  ] Unresponsive    Skin:  [ x ] Intact [  ] Redness [  ] Thrombophlebitis  [  ] Rashes  [  ] Dry  [  ] Ulcers    Ortho:  [  ] Joint Swelling  [  ] Joint erythema [  ] Joint tenderness                [  ] Increased temp. to touch  [  ] DJD [ x ] WNL            LABS/DIAGNOSTIC TESTS                        8.5    11.0  )-----------( 204      ( 22 Jun 2018 06:32 )             28.1   06-22    146<H>  |  110<H>  |  35<H>  ----------------------------<  111<H>  3.3<L>   |  30  |  0.75    Ca    8.4      22 Jun 2018 06:32    ABG - ( 22 Jun 2018 01:09 )  pH, Arterial: 7.44  pH, Blood: x     /  pCO2: 40    /  pO2: 259   / HCO3: 27    / Base Excess: 2.8   /  SaO2: NR>99.6           CULTURES:     Culture - Blood (06.15.18 @ 09:37)    Specimen Source: .Blood Blood    Culture Results:   No growth to date.    Culture - Blood (06.15.18 @ 09:37)    Specimen Source: .Blood Blood    Culture Results:   No growth to date.      Culture - Urine (06.12.18 @ 23:37)    Specimen Source: .Urine Clean Catch (Midstream)    Culture Results:   No growth        RADIOLOGY:    EXAM:  XR CHEST PORTABLE ROUTINE 1V                            PROCEDURE DATE:  06/21/2018          INTERPRETATION:  AP semisupine chest on June 21, 2018 at 9:36 AM. Patient   is being followed for positive lung findings.    Heart enlargement and left-sided pacemaker again noted.    On June 20 there were central congestive findings and a right upper lobe   infiltrate. The central congestion has improved with mild residual. Right   upper lobe infiltrate is relatively unchanged.    IMPRESSION: Asabove.          EXAM:  CT ABDOMEN AND PELVIS                          EXAM:  CT CHEST                            PROCEDURE DATE:  06/16/2018          INTERPRETATION:  CT CHEST/ABDOMEN/PELVIS:     CLINICAL INFORMATION: Fever    COMPARISON: Chest x-ray of the previous day.    PROCEDURE:  Using multislice helical CT, 2.5 mm sections were obtained   from the thoracic inlet through the ischial tuberosities without the   administration of intravenous contrast. Oral contrast was not   administered.    Coronal and sagittal reformations were performed by  CT technologist and   made available for review.    FINDINGS:  The visualized structures of the lower neck are unremarkable.   The visualized aorta is of normal course and caliber. The heart is not   enlarged. There is no evidence of pericardial effusion.    There is no evidence of axillary, hilar, or mediastinal lymphadenopathy.   There is coronary artery calcification and atherosclerotic calcification   of the aorta.    No focal lung consolidations identified. No evidence of pneumothorax. No   pulmonary nodules identified. There are small bilateral pleural   effusions. There is passive atelectasis at the lung bases. There is   pulmonary vascular.      The liver is of normal contour. No evidence of focal hepatic lesion on   this nonenhanced examination. The gallbladder is present. No evidence of   intra or extrahepatic biliary dilatation.     The pancreas, spleen, and adrenal glands are grossly unremarkable. There   is a large nonobstructive stone in the midpole of the left kidney. There   is no evidence of hydronephrosis or perinephric stranding. No evidence of    nephrolithiasis.The bladder is unremarkable.    No evidence of lymphadenopathy utilizing CT size criteria. There is   aneurysmal dilatation of the infrarenal abdominal aorta with peripheral   calcification. The aneurysm measures up to 6.4 cm. It terminates above   the bifurcation. 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   There are bilateral inguinal hernias with fat. There are also   non obstructed small bowel loops within the right inguinal hernia..    There is no evidence of bowel obstruction. There is no evidence of   pneumoperitoneum or free fluid.    The visualized osseous structures demonstrate osteopenia and degenerative   changes. There is moderate compression fracture of L2 which is   indeterminate in age. There is severe compression fracture of T11 with   retropulsion into the spinal canal. This is also indeterminate in age..    IMPRESSION: Large infrarenal abdominal aortic aneurysm measuring up to   6.4 cm which is also noted on the plain films of lumbar spine of   9/8/2017. 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   Bilateral pleural effusions with mild bibasilar atelectasis. Mild   pulmonary vascular congestion.  Nonobstructive left renal calculus  Non obstructing right inguinal hernia containing small bowel loops  Indeterminate age severe compression fracture T12 and moderate   compression fracture L2      Assessment and Recommendation:   89 yo male from home HHA 12 hours ambulates with walker pmh of BPH, arthiritis hypothyroidism, thoracic aneurysm  presented to the hospital with complaints of weakness, aphasia , poor resposiveness and unable to get up from toilet seat. history obtained by family at bedside. According to his wife, he had been "shaking" for most of the night. On 6/12/18 he awoke and was able to walk to the bathroom with his walker as usual. He sat on the toilet, and then had difficulty getting off the toiletThere was diminished verbal output. wife said he used his left arm to try to get up but she felt his right side was weak. his eyes were closed and he was not responsive. she called EMS. ROS positive for increased urinary frequency and buring for past couple of days. no chest pain , SOB cough.    in the ER Stroke code called on arrival at 7:15a, tele-stroke activated at 7:20a. VSS stable  EXCEPT tmax 102.4. ON TELE stroke patient had NIH scale of 15. b/l weakness greater on right side. code sepsis was also initiaated. labs pertinent for wbc count of q12 , ekg nsr left axis. lactate of 2.4. cxr : Mild pulmonary vascular congestion. Small right basilar nodular opacity. ct head: Moderate severe chronic small vessel ischemic changes in the frontal parietal white matter and small chronic appearing right frontal cortical infarct. TPA given at 9 am , and icu consult called.  code sepsis was also initiated. labs pertinent for wbc count of q12 , ekg nsr left axis. lactate of 2.4. cxr : Mild pulmonary vascular congestion. Small right basilar nodular opacity. started on rocephin and zmax    # fevers unclear etiology , bladder scan with only 42 cc of urine r/o worsening pneumonia.(improved)  # no objective evidence of bacterial infection as cause of fevers on adm . all cx neg /rvp swab neg /ct chest with no consolidation . pct slightly elevated , bnp elevated   # mild elevation of wbc improved and WBC is normal today 6/21/18 (patient is off ABX).   6/22/18 Patient is afebrile, but WBC is mildly elevated.  6/23/18 Patient stays afebrile and PPM site is dry and clean    plan  maintain off abx.  Observe for fever and closely follow WBC.  Cardiac management as per cardiologist.  S/P PPM as per cardio for follow up in 2 weeks.  Pane cultures, and follow up CXR for fever or continued increase of WBC.    Discussed with medical resident.

## 2018-06-23 NOTE — PROGRESS NOTE ADULT - ATTENDING COMMENTS
Patient is seen and examined. Case reviewed with the medical team. Above note is appreciated. Will follow up clinically. Continue DVT prophylaxis. Case discussed with Cardiology, case management and [facility] nurse earlier today. Patient for discharge to [facility] after 12 noon today.

## 2018-06-23 NOTE — PROGRESS NOTE ADULT - PROBLEM SELECTOR PROBLEM 2
Sepsis

## 2018-06-23 NOTE — PROGRESS NOTE ADULT - PROBLEM SELECTOR PLAN 8
On Heparin S/C
On Heparin S/C
Patient noted with HR 40's  Pacemaker placed by Dr Wilson  Cardiology (Dr. Umanzor)
Patient noted with HR 40's  Pacemaker placed by Dr Wilson  Cardiology (Dr. Umanzor).   Will continue follow up outpatient with Cardiologist Dr Wilson
On Heparin S/C

## 2018-06-23 NOTE — PROGRESS NOTE ADULT - PROBLEM SELECTOR PLAN 1
Managed initially in the ICU with s/p TPA. Initial CT of head negative. MRI with chronic infarcts  Will need to continue asa and statin.

## 2018-06-23 NOTE — DISCHARGE NOTE ADULT - HOSPITAL COURSE
89 yo male from home HHA 12 hours ambulates with walker pmh of BPH, arthiritis hypothyroidism, thoracic aneurysm  presented to the hospital with complaints of weakness, aphasia , poor resposiveness and unable to get up from toilet seat. history obtained by family at bedside. According to his wife, he had been "shaking" for most of the night. On 6/12/18 he awoke and was able to walk to the bathroom with his walker as usual. He sat on the toilet, and then had difficulty getting off the toiletThere was diminished verbal output. wife said he used his left arm to try to get up but she felt his right side was weak. his eyes were closed and he was not responsive. she called EMS. ROS positive for increased urianry frequency and buring for past couple of days. no chest pain , SOB cough.    in the ER Stroke code called on arrival at 7:15a, tele-stroke activated at 7:20a. VS STABEL EXCEPT tmax 102.4. ON TELE stroke patient had NIH scale of 15. b/l weakness greater on right side. code sepsis was also initiaated. labs pertinent for wbc count of q12 , ekg nsr left axis. lactate of 2.4. cxr : Mild pulmonary vascular congestion. Small right basilar nodular opacity. 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 TPA given at 9 am , and icu consult called.   and unable to get up from toilet seat. history obtained by family at bedside. According to his wife, he had been "shaking" for most of the night. On 6/12/18 he awoke and was able to walk to the bathroom with his walker as usual. He sat on the toilet, and then had difficulty getting off the toiletThere was diminished verbal output. wife said he used his left arm to try to get up but she felt his right side was weak. his eyes were closed and he was not responsive. she called EMS. ROS positive for increased urianry frequency and buring for past couple of days. no chest pain , SOB cough.    in the ER Stroke code called on arrival at 7:15a, tele-stroke activated at 7:20a. VS STABEL EXCEPT tmax 102.4. ON TELE stroke patient had NIH scale of 15. b/l weakness greater on right side. code sepsis was also initiaated. labs pertinent for wbc count of q12 , ekg nsr left axis. lactate of 2.4. cxr : Mild pulmonary vascular congestion. Small right basilar nodular opacity. 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 TPA given at 9 am , and icu consult called. (12 Jun 2018 10:18)     Problem: CVA (cerebral vascular accident).  Plan: Managed initially in the ICU  s/p TPA  CT head negative  MRI with chronic infarcts  Will need to continue asa and statin.      Problem/Plan - 2:  ·  Problem: Sepsis.  Plan: Resolved  Leukocytosis resolved  Likely secondary to PNA but appears to be more CHF / increased volume on CT  cultures negative  As per ID will D/C all antibiotics  ID (Dr. Rosales).      Problem/Plan - 3:  ·  Problem: AAA (abdominal aortic aneurysm) without rupture.  Plan: Large infrarenal abdominal aortic aneurysm measuring up to   6.4 cm which is also noted on the plain films of lumbar spine of 9/8/2017  Vascular Surgery Dr. Bolanos  - Previously made decision to avoid AAA surgery   - Cont med mgmt  - family refuse any surgery for AAA. and patient is not a surgical candidate and the family does not want any invasive measures and understand the rupture of the AAA.      Problem/Plan - 4:  ·  Problem: Disorientation.  Plan: Likely sundowning.   Started in Risperdal by Dr Luis  No restraints or Haldol  Needs frequent verbal and physical redirection.      Problem/Plan - 5:  ·  Problem: Anemia.  Plan: Noted drop in H/H  Stable Hemoglobin  s/p TPA  monitor CBC daily  occult stool - negative.      Problem/Plan - 6:  Problem: Hypothyroidism. Plan: c/w synthroid.     Problem/Plan - 7:  ·  Problem: BPH (benign prostatic hyperplasia).  Plan: c/w home med Flomax as tolerated.      Problem/Plan - 8:  ·  Problem: Bradycardia.  Plan: Patient noted with HR 40's  Pacemaker placed by Dr Wilson  Cardiology (Dr. Umanzor). Background  and course of admission:    87 y/o male from home with  HHA 12 hours ambulates with walker pmh of BPH, arthritis hypothyroidism, thoracic aneurysm presented to the hospital with complaints of weakness, aphasia , poor responsiveness and unable to get up from toilet seat.  Admitted to ICU for concern of CVA s/p TPA monitoring and sepsis likely secondary to pna vs UTI. Managed on the floor for the above mentioned symptoms and later transferred to the Telemetry floor due episodes of Bradycardia. Patient had a pacemaker placement.  Family has also refused any intervention for the AAA in the past and now. Patient is a at very high risk for any vascular procedures.  Physical therapy evaluation recommended sub acute Rehabilation but family opted for assisted living.      Managed for the following problems and plans:      Problem/Plan - 1:  ·  Problem: CVA (cerebral vascular accident).  Plan:   Managed initially in the ICU with s/p TPA. Initial CT of head negative. MRI with chronic infarcts  Will need to continue asa and statin.      Problem/Plan - 2:  ·  Problem: Sepsis.  Plan:   Resolved  Leukocytosis resolved  Likely secondary to PNA but appears to be more CHF / increased volume on CT  cultures negative  No need for antibiotics, completed a course during hospitalization  ID (Dr. Rosales) was consulted     Problem/Plan - 3:  ·  Problem: AAA (abdominal aortic aneurysm) without rupture.  Plan: Large infrarenal abdominal aortic aneurysm measuring up to   6.4 cm which is also noted on the plain films of lumbar spine of 9/8/2017  Vascular Surgery Dr. Bolanos. Previously made decision to avoid AAA surgery   Will continue cont med management. Family refuse any surgery for AAA and patient is not a surgical candidate and the family does not want any invasive measures and understand the rupture of the AAA.      Problem/Plan - 4:  ·  Problem: Disorientation.  Plan: Likely sundowning.   Started in Risperdal by Dr Luis  No restraints or Haldol  Needs frequent verbal and physical redirection     Problem/Plan - 5:  ·  Problem: Anemia.  Plan:   Stable Hemoglobin during admission   s/p TPA  occult stool - negative.      Problem/Plan - 6:  Problem: Hypothyroidism. Plan: c/w synthroid.     Problem/Plan - 7:  ·  Problem: BPH (benign prostatic hyperplasia).  Plan: c/w home med Flomax as tolerated.      Problem/Plan - 8:  ·  Problem: Bradycardia.  Plan:   Patient noted with HR 40's  Pacemaker placed by Dr Wilson  Cardiology (Dr. Umanzor).   Will continue follow up outpatient with Cardiologist Dr Wilson     Given patient's improved clinical status and current hemodynamic stability, decision was made to discharge.  Please refer to patient's complete medical chart with documents for a full hospital course, for this is only a brief summary.

## 2018-06-23 NOTE — PROGRESS NOTE ADULT - PROBLEM SELECTOR PLAN 3
Large infrarenal abdominal aortic aneurysm measuring up to   6.4 cm which is also noted on the plain films of lumbar spine of 9/8/2017  Vascular Surgery Dr. Bolanos. Previously made decision to avoid AAA surgery   Will continue cont med management. Family refuse any surgery for AAA and patient is not a surgical candidate and the family does not want any invasive measures and understand the rupture of the AAA.

## 2018-06-23 NOTE — DISCHARGE NOTE ADULT - SECONDARY DIAGNOSIS.
Sepsis BPH (benign prostatic hyperplasia) Hypothyroidism AAA (abdominal aortic aneurysm) without rupture Anemia

## 2018-06-23 NOTE — PROGRESS NOTE ADULT - SUBJECTIVE AND OBJECTIVE BOX
==================PGY 1 Note===================   Discussed with supervising resident and primary attending    ================CHIEF COMPLAINT===============  Patient is a 89y old  Male who presents with a chief complaint of weakness. aphasia (12 Jun 2018 10:18)        =========INTERVAL HPI/OVERNIGHT EVENTS=========  Offers no new complaints      ============CURRENT MEDICATIONS===============    MEDICATIONS  (STANDING):  aspirin  chewable 81 milliGRAM(s) Oral daily  atorvastatin 40 milliGRAM(s) Oral at bedtime  docusate sodium 100 milliGRAM(s) Oral two times a day  furosemide   Injectable 20 milliGRAM(s) IV Push daily  heparin  Injectable 5000 Unit(s) SubCutaneous every 12 hours  levothyroxine 100 MICROGram(s) Oral daily  metoprolol tartrate 12.5 milliGRAM(s) Oral two times a day  potassium chloride   Powder 20 milliEquivalent(s) Oral daily  risperiDONE   Tablet 1 milliGRAM(s) Oral at bedtime  senna 2 Tablet(s) Oral at bedtime  tamsulosin 0.4 milliGRAM(s) Oral at bedtime    MEDICATIONS  (PRN):        ============REVIEW OF SYSTEMS==================    CONSTITUTIONAL: No fever  EYES: no acute visual disturbances  NECK: No pain or stiffness  RESPIRATORY: No cough; No shortness of breath  CARDIOVASCULAR: No chest pain, no palpitations  GASTROINTESTINAL: No pain. No nausea or vomiting; No diarrhea   NEUROLOGICAL: No headache or numbness, no tremors  MUSCULOSKELETAL: No joint pain, no muscle pain  GENITOURINARY: no dysuria, no frequency, no hesitancy  PSYCHIATRY: no depression , no anxiety  ALL OTHER  ROS negative      ================VITALS SIGNS=====================  Vital Signs Last 24 Hrs  T(C): 36.4 (23 Jun 2018 19:27), Max: 36.7 (23 Jun 2018 04:57)  T(F): 97.6 (23 Jun 2018 19:27), Max: 98.1 (23 Jun 2018 04:57)  HR: 65 (23 Jun 2018 19:27) (65 - 81)  BP: 133/54 (23 Jun 2018 19:27) (102/45 - 133/54)  BP(mean): --  RR: 18 (23 Jun 2018 19:27) (17 - 19)  SpO2: 98% (23 Jun 2018 19:27) (95% - 100%)    ===============PHYSICAL EXAM====================    GENERAL: NAD  HEENT: Normocephalic;  conjunctivae and sclerae clear; moist mucous membranes;   NECK : supple  CHEST/LUNG: Clear to auscultation bilaterally with good air entry   HEART: S1 S2  regular; no murmurs, gallops or rubs  ABDOMEN: Soft, Nontender, Nondistended; Bowel sounds present  EXTREMITIES: no cyanosis; no edema; no calf tenderness  SKIN: warm and dry; no rash  NERVOUS SYSTEM:  Awake and alert; Oriented  to person  ==============LABORATORIES======================  LABS:                        8.5    11.0  )-----------( 204      ( 22 Jun 2018 06:32 )             28.1     06-22    146<H>  |  110<H>  |  35<H>  ----------------------------<  111<H>  3.3<L>   |  30  |  0.75    Ca    8.4      22 Jun 2018 06:32          CAPILLARY BLOOD GLUCOSE          =============INPUTS/OUPUTS=====================        RADIOLOGY & ADDITIONAL TESTS:    Imaging Personally Reviewed:  YES    Consultant(s) Notes Reviewed:   YES    Care Discussed with Consultants : YES    Plan of care was discussed with patient  and /or primary care giver; all questions and concerns were addressed and care was aligned with patient's wishes. Time was allowed for questions that were answered to the best of my abilities

## 2018-06-23 NOTE — DISCHARGE NOTE ADULT - CARE PLAN
Principal Discharge DX:	CVA (cerebral vascular accident)  Goal:	To treat the underlying medical condition  Assessment and plan of treatment:	stable  Secondary Diagnosis:	Sepsis  Secondary Diagnosis:	BPH (benign prostatic hyperplasia) Principal Discharge DX:	CVA (cerebral vascular accident)  Goal:	To treat the underlying medical condition  Assessment and plan of treatment:	Managed initially in the ICU with s/p TPA. Initial CT of head negative. MRI with chronic infarcts  Will need to continue asa and statin.  Secondary Diagnosis:	Sepsis  Assessment and plan of treatment:	Resolved  Leukocytosis resolved  Likely secondary to PNA but appears to be more CHF / increased volume on CT  cultures negative  No need for antibiotics, completed a course during hospitalization  Secondary Diagnosis:	BPH (benign prostatic hyperplasia)  Assessment and plan of treatment:	Continue with Flomax as indicated in medication reconciliation. Follow up with your primary care physician within one week of discharge to inform of your recent hospitalization.  Secondary Diagnosis:	Hypothyroidism  Assessment and plan of treatment:	continue with synthroid.  Secondary Diagnosis:	AAA (abdominal aortic aneurysm) without rupture  Assessment and plan of treatment:	arge infrarenal abdominal aortic aneurysm measuring up to   6.4 cm which is also noted on the plain films of lumbar spine of 9/8/2017  Vascular Surgery Dr. Bolanos. Previously made decision to avoid AAA surgery   Will continue cont med management. Family refuse any surgery for AAA and patient is not a surgical candidate and the family does not want any invasive measures and understand the rupture of the AAA.  Secondary Diagnosis:	Anemia  Assessment and plan of treatment:	Stable Hemoglobin during admission   s/p TPA  occult stool - negative.

## 2018-06-23 NOTE — DISCHARGE NOTE ADULT - PATIENT PORTAL LINK FT
You can access the [facility] Patient Portal, offered by [facility], by registering with the following website: [email or web address]

## 2018-06-23 NOTE — DISCHARGE NOTE ADULT - MEDICATION SUMMARY - MEDICATIONS TO STOP TAKING
I will STOP taking the medications listed below when I get home from the hospital:    TRAMADOL HCL 50 MG TABS

## 2018-06-23 NOTE — PROGRESS NOTE ADULT - PROBLEM SELECTOR PLAN 2
Resolved  Leukocytosis resolved  Likely secondary to PNA but appears to be more CHF / increased volume on CT  cultures negative  No need for antibiotics, completed a course during hospitalization  ID (Dr. Rosales) was consulted

## 2018-06-24 VITALS
OXYGEN SATURATION: 100 % | SYSTOLIC BLOOD PRESSURE: 126 MMHG | TEMPERATURE: 97 F | DIASTOLIC BLOOD PRESSURE: 70 MMHG | RESPIRATION RATE: 20 BRPM | HEART RATE: 71 BPM

## 2018-06-24 LAB
ANION GAP SERPL CALC-SCNC: 4 MMOL/L — LOW (ref 5–17)
BASOPHILS # BLD AUTO: 0.1 K/UL — SIGNIFICANT CHANGE UP (ref 0–0.2)
BASOPHILS NFR BLD AUTO: 0.7 % — SIGNIFICANT CHANGE UP (ref 0–2)
BUN SERPL-MCNC: 31 MG/DL — HIGH (ref 7–18)
CALCIUM SERPL-MCNC: 8.6 MG/DL — SIGNIFICANT CHANGE UP (ref 8.4–10.5)
CHLORIDE SERPL-SCNC: 111 MMOL/L — HIGH (ref 96–108)
CO2 SERPL-SCNC: 31 MMOL/L — SIGNIFICANT CHANGE UP (ref 22–31)
CREAT SERPL-MCNC: 0.67 MG/DL — SIGNIFICANT CHANGE UP (ref 0.5–1.3)
EOSINOPHIL # BLD AUTO: 0 K/UL — SIGNIFICANT CHANGE UP (ref 0–0.5)
EOSINOPHIL NFR BLD AUTO: 0.2 % — SIGNIFICANT CHANGE UP (ref 0–6)
GLUCOSE SERPL-MCNC: 112 MG/DL — HIGH (ref 70–99)
HCT VFR BLD CALC: 27.1 % — LOW (ref 39–50)
HGB BLD-MCNC: 8.5 G/DL — LOW (ref 13–17)
LYMPHOCYTES # BLD AUTO: 1.1 K/UL — SIGNIFICANT CHANGE UP (ref 1–3.3)
LYMPHOCYTES # BLD AUTO: 13.7 % — SIGNIFICANT CHANGE UP (ref 13–44)
MCHC RBC-ENTMCNC: 31.2 GM/DL — LOW (ref 32–36)
MCHC RBC-ENTMCNC: 34.3 PG — HIGH (ref 27–34)
MCV RBC AUTO: 109.8 FL — HIGH (ref 80–100)
MONOCYTES # BLD AUTO: 0.4 K/UL — SIGNIFICANT CHANGE UP (ref 0–0.9)
MONOCYTES NFR BLD AUTO: 5.3 % — SIGNIFICANT CHANGE UP (ref 2–14)
NEUTROPHILS # BLD AUTO: 6.2 K/UL — SIGNIFICANT CHANGE UP (ref 1.8–7.4)
NEUTROPHILS NFR BLD AUTO: 80.2 % — HIGH (ref 43–77)
PLATELET # BLD AUTO: 175 K/UL — SIGNIFICANT CHANGE UP (ref 150–400)
POTASSIUM SERPL-MCNC: 4.2 MMOL/L — SIGNIFICANT CHANGE UP (ref 3.5–5.3)
POTASSIUM SERPL-SCNC: 4.2 MMOL/L — SIGNIFICANT CHANGE UP (ref 3.5–5.3)
RBC # BLD: 2.47 M/UL — LOW (ref 4.2–5.8)
RBC # FLD: 21.8 % — HIGH (ref 10.3–14.5)
SODIUM SERPL-SCNC: 146 MMOL/L — HIGH (ref 135–145)
WBC # BLD: 7.7 K/UL — SIGNIFICANT CHANGE UP (ref 3.8–10.5)
WBC # FLD AUTO: 7.7 K/UL — SIGNIFICANT CHANGE UP (ref 3.8–10.5)

## 2018-06-24 PROCEDURE — 87486 CHLMYD PNEUM DNA AMP PROBE: CPT

## 2018-06-24 PROCEDURE — 83605 ASSAY OF LACTIC ACID: CPT

## 2018-06-24 PROCEDURE — 96374 THER/PROPH/DIAG INJ IV PUSH: CPT

## 2018-06-24 PROCEDURE — C1894: CPT

## 2018-06-24 PROCEDURE — 87040 BLOOD CULTURE FOR BACTERIA: CPT

## 2018-06-24 PROCEDURE — 70551 MRI BRAIN STEM W/O DYE: CPT

## 2018-06-24 PROCEDURE — 96375 TX/PRO/DX INJ NEW DRUG ADDON: CPT

## 2018-06-24 PROCEDURE — 83880 ASSAY OF NATRIURETIC PEPTIDE: CPT

## 2018-06-24 PROCEDURE — 87086 URINE CULTURE/COLONY COUNT: CPT

## 2018-06-24 PROCEDURE — 97164 PT RE-EVAL EST PLAN CARE: CPT

## 2018-06-24 PROCEDURE — 71045 X-RAY EXAM CHEST 1 VIEW: CPT

## 2018-06-24 PROCEDURE — 82962 GLUCOSE BLOOD TEST: CPT

## 2018-06-24 PROCEDURE — 82607 VITAMIN B-12: CPT

## 2018-06-24 PROCEDURE — 76000 FLUOROSCOPY <1 HR PHYS/QHP: CPT

## 2018-06-24 PROCEDURE — 70450 CT HEAD/BRAIN W/O DYE: CPT

## 2018-06-24 PROCEDURE — 86901 BLOOD TYPING SEROLOGIC RH(D): CPT

## 2018-06-24 PROCEDURE — 82746 ASSAY OF FOLIC ACID SERUM: CPT

## 2018-06-24 PROCEDURE — 82803 BLOOD GASES ANY COMBINATION: CPT

## 2018-06-24 PROCEDURE — 71250 CT THORAX DX C-: CPT

## 2018-06-24 PROCEDURE — 87798 DETECT AGENT NOS DNA AMP: CPT

## 2018-06-24 PROCEDURE — 85610 PROTHROMBIN TIME: CPT

## 2018-06-24 PROCEDURE — 84145 PROCALCITONIN (PCT): CPT

## 2018-06-24 PROCEDURE — 93970 EXTREMITY STUDY: CPT

## 2018-06-24 PROCEDURE — 86850 RBC ANTIBODY SCREEN: CPT

## 2018-06-24 PROCEDURE — 83735 ASSAY OF MAGNESIUM: CPT

## 2018-06-24 PROCEDURE — 83036 HEMOGLOBIN GLYCOSYLATED A1C: CPT

## 2018-06-24 PROCEDURE — C1785: CPT

## 2018-06-24 PROCEDURE — 92610 EVALUATE SWALLOWING FUNCTION: CPT

## 2018-06-24 PROCEDURE — 85027 COMPLETE CBC AUTOMATED: CPT

## 2018-06-24 PROCEDURE — 80061 LIPID PANEL: CPT

## 2018-06-24 PROCEDURE — 93005 ELECTROCARDIOGRAM TRACING: CPT

## 2018-06-24 PROCEDURE — 80053 COMPREHEN METABOLIC PANEL: CPT

## 2018-06-24 PROCEDURE — 99285 EMERGENCY DEPT VISIT HI MDM: CPT | Mod: 25

## 2018-06-24 PROCEDURE — 86900 BLOOD TYPING SEROLOGIC ABO: CPT

## 2018-06-24 PROCEDURE — 81001 URINALYSIS AUTO W/SCOPE: CPT

## 2018-06-24 PROCEDURE — 74176 CT ABD & PELVIS W/O CONTRAST: CPT

## 2018-06-24 PROCEDURE — 87581 M.PNEUMON DNA AMP PROBE: CPT

## 2018-06-24 PROCEDURE — 84484 ASSAY OF TROPONIN QUANT: CPT

## 2018-06-24 PROCEDURE — 80048 BASIC METABOLIC PNL TOTAL CA: CPT

## 2018-06-24 PROCEDURE — 87633 RESP VIRUS 12-25 TARGETS: CPT

## 2018-06-24 PROCEDURE — 93306 TTE W/DOPPLER COMPLETE: CPT

## 2018-06-24 PROCEDURE — 82306 VITAMIN D 25 HYDROXY: CPT

## 2018-06-24 PROCEDURE — 82272 OCCULT BLD FECES 1-3 TESTS: CPT

## 2018-06-24 PROCEDURE — 85730 THROMBOPLASTIN TIME PARTIAL: CPT

## 2018-06-24 PROCEDURE — 84100 ASSAY OF PHOSPHORUS: CPT

## 2018-06-24 PROCEDURE — 84443 ASSAY THYROID STIM HORMONE: CPT

## 2018-06-24 PROCEDURE — 93880 EXTRACRANIAL BILAT STUDY: CPT

## 2018-06-24 PROCEDURE — C1898: CPT

## 2018-06-24 PROCEDURE — 86923 COMPATIBILITY TEST ELECTRIC: CPT

## 2018-06-24 RX ADMIN — HEPARIN SODIUM 5000 UNIT(S): 5000 INJECTION INTRAVENOUS; SUBCUTANEOUS at 06:02

## 2018-06-24 RX ADMIN — Medication 100 MILLIGRAM(S): at 06:01

## 2018-06-24 RX ADMIN — Medication 81 MILLIGRAM(S): at 12:46

## 2018-06-24 RX ADMIN — Medication 12.5 MILLIGRAM(S): at 06:01

## 2018-06-24 RX ADMIN — Medication 20 MILLIEQUIVALENT(S): at 12:32

## 2018-06-24 RX ADMIN — Medication 100 MICROGRAM(S): at 06:02

## 2018-06-24 NOTE — PROGRESS NOTE ADULT - SUBJECTIVE AND OBJECTIVE BOX
Id attending covering for Dr. Ruiz.  Chart is reviewed and patient is examined.     MEDICATIONS  (STANDING):  aspirin  chewable 81 milliGRAM(s) Oral daily  atorvastatin 40 milliGRAM(s) Oral at bedtime  docusate sodium 100 milliGRAM(s) Oral two times a day  heparin  Injectable 5000 Unit(s) SubCutaneous every 12 hours  levothyroxine 100 MICROGram(s) Oral daily  metoprolol tartrate 12.5 milliGRAM(s) Oral two times a day  risperiDONE   Tablet 1 milliGRAM(s) Oral at bedtime  senna 2 Tablet(s) Oral at bedtime  tamsulosin 0.4 milliGRAM(s) Oral at bedtime    MEDICATIONS  (PRN):      Allergies:  Allergies    No Known Allergies    Intolerances        ROS  [  ] UNABLE TO ELICIT    General:  [  ] None  [  ] Fever  [  ] Chills  [ x ] Malaise    Skin:  [ x ] None [  ] Rash  [  ] Wound  [  ] Ulcer    HEENT:  [ x ] None  [  ] Sore Throat  [  ] Nasal congestion/ runny nose  [  ] Photophobia [  ] Neck pain      Chest:  [  ] None   [  ] SOB  [ x ] Cough  [  ] None    Cardiovascular:   [ x ] None  [  ] CP  [  ] Palpitation    Gastrointestinal:  [ x ] None  [  ] Abd pain   [  ] Nausea    [  ] Vomiting   [  ] Diarrhea	     Genitourinary:  [ x ] None [  ] Polyuria   [  ] Urgency  [  ] Frequency  [  ] Dysuria    [  ]  Hematuria       Musculoskeletal:  [  ] None [  ] Back Pain	[  ] Body aches  [  ] Joint pain [ x ] Weakness    Neurological: [  ] None [  ]Dizziness  [  ]Visual Disturbance  [  ]Headaches   [ x ] Weakness          PHYSICAL EXAM:  Vital Signs Last 24 Hrs  T(C): 36.3 (24 Jun 2018 08:18), Max: 36.6 (23 Jun 2018 15:45)  T(F): 97.3 (24 Jun 2018 08:18), Max: 97.8 (23 Jun 2018 15:45)  HR: 71 (24 Jun 2018 08:18) (65 - 72)  BP: 126/70 (24 Jun 2018 08:18) (102/52 - 133/54)  BP(mean): --  RR: 20 (24 Jun 2018 08:18) (18 - 20)  SpO2: 100% (24 Jun 2018 08:18) (96% - 100%)    HEENT: [ x ] Wnl  [  ] Pharyngeal congestion    Neck:  [ x ] Supple  [  ]Lymphadenopathy  [ x ] No JVD   [  ] JVD  [  ] Masses   [  ] WNL    CHEST/Respiratory:  [  ]Clear to auscultation  [ x ] Rales on bases  [  ] Rhonchi   [  ] Wheezing     [ x ] Chest PPM left pectoral area      Cardiovascular:  [ x ] Reg S1 S2   [  ] Irreg S1 S2   [ x ]No Murmur  [  ] +ve Murmurs  [  ]Systolic [  ]Diastolic      Abdomen:  [ x ] Soft  [  ] No tendrerness  [  ] Tenderness  [  ] Organomegaly  [ x ] Mild ABD Distention  [  ] Rigidity                       [ x ] No Regidity                       [ x ] No Rebound Tenderness  [  ] No Guarding Rigidity  [  ] Rebound Tenderness[  ] Guarding Rigidity                          [ x ]  +ve Bowel Sounds  [  ] Decreased Bowel Sounds    [  ] Absent Bowel Sounds                            Extremities: [ x ] No edema [  ] Edema  [  ] Clubbing   [  ] Cyanosis                         [ x ] No Tender Calf muscles  [  ] Tender Calf muscles                        [ x ] Palpable peripheral pulses [ x ] Sling LUE.    Neurological: [ x ] Awake  [ x ] Alert  [ x ] Oriented  x  2                           [  ] Confused  [ x ] Drowzy  [  ] repond to painful stimuli  [  ] Unresponsive    Skin:  [ x ] Intact [  ] Redness [  ] Thrombophlebitis  [  ] Rashes  [  ] Dry  [  ] Ulcers    Ortho:  [  ] Joint Swelling  [  ] Joint erythema [  ] Joint tenderness                [  ] Increased temp. to touch  [  ] DJD [ x ] WNL            LABS/DIAGNOSTIC TESTS                        8.5    7.7   )-----------( 175      ( 24 Jun 2018 07:13 )             27.1   06-24    146<H>  |  111<H>  |  31<H>  ----------------------------<  112<H>  4.2   |  31  |  0.67    Ca    8.6      24 Jun 2018 07:13            CULTURES:     Culture - Blood (06.15.18 @ 09:37)    Specimen Source: .Blood Blood    Culture Results:   No growth to date.    Culture - Blood (06.15.18 @ 09:37)    Specimen Source: .Blood Blood    Culture Results:   No growth to date.      Culture - Urine (06.12.18 @ 23:37)    Specimen Source: .Urine Clean Catch (Midstream)    Culture Results:   No growth        RADIOLOGY:    EXAM:  XR CHEST PORTABLE ROUTINE 1V                            PROCEDURE DATE:  06/21/2018          INTERPRETATION:  AP semisupine chest on June 21, 2018 at 9:36 AM. Patient   is being followed for positive lung findings.    Heart enlargement and left-sided pacemaker again noted.    On June 20 there were central congestive findings and a right upper lobe   infiltrate. The central congestion has improved with mild residual. Right   upper lobe infiltrate is relatively unchanged.    IMPRESSION: Asabove.          EXAM:  CT ABDOMEN AND PELVIS                          EXAM:  CT CHEST                            PROCEDURE DATE:  06/16/2018          INTERPRETATION:  CT CHEST/ABDOMEN/PELVIS:     CLINICAL INFORMATION: Fever    COMPARISON: Chest x-ray of the previous day.    PROCEDURE:  Using multislice helical CT, 2.5 mm sections were obtained   from the thoracic inlet through the ischial tuberosities without the   administration of intravenous contrast. Oral contrast was not   administered.    Coronal and sagittal reformations were performed by  CT technologist and   made available for review.    FINDINGS:  The visualized structures of the lower neck are unremarkable.   The visualized aorta is of normal course and caliber. The heart is not   enlarged. There is no evidence of pericardial effusion.    There is no evidence of axillary, hilar, or mediastinal lymphadenopathy.   There is coronary artery calcification and atherosclerotic calcification   of the aorta.    No focal lung consolidations identified. No evidence of pneumothorax. No   pulmonary nodules identified. There are small bilateral pleural   effusions. There is passive atelectasis at the lung bases. There is   pulmonary vascular.      The liver is of normal contour. No evidence of focal hepatic lesion on   this nonenhanced examination. The gallbladder is present. No evidence of   intra or extrahepatic biliary dilatation.     The pancreas, spleen, and adrenal glands are grossly unremarkable. There   is a large nonobstructive stone in the midpole of the left kidney. There   is no evidence of hydronephrosis or perinephric stranding. No evidence of    nephrolithiasis.The bladder is unremarkable.    No evidence of lymphadenopathy utilizing CT size criteria. There is   aneurysmal dilatation of the infrarenal abdominal aorta with peripheral   calcification. The aneurysm measures up to 6.4 cm. It terminates above   the bifurcation. 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   There are bilateral inguinal hernias with fat. There are also   non obstructed small bowel loops within the right inguinal hernia..    There is no evidence of bowel obstruction. There is no evidence of   pneumoperitoneum or free fluid.    The visualized osseous structures demonstrate osteopenia and degenerative   changes. There is moderate compression fracture of L2 which is   indeterminate in age. There is severe compression fracture of T11 with   retropulsion into the spinal canal. This is also indeterminate in age..    IMPRESSION: Large infrarenal abdominal aortic aneurysm measuring up to   6.4 cm which is also noted on the plain films of lumbar spine of   9/8/2017. 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   Bilateral pleural effusions with mild bibasilar atelectasis. Mild   pulmonary vascular congestion.  Nonobstructive left renal calculus  Non obstructing right inguinal hernia containing small bowel loops  Indeterminate age severe compression fracture T12 and moderate   compression fracture L2      Assessment and Recommendation:   89 yo male from home HHA 12 hours ambulates with walker pmh of BPH, arthiritis hypothyroidism, thoracic aneurysm  presented to the hospital with complaints of weakness, aphasia , poor resposiveness and unable to get up from toilet seat. history obtained by family at bedside. According to his wife, he had been "shaking" for most of the night. On 6/12/18 he awoke and was able to walk to the bathroom with his walker as usual. He sat on the toilet, and then had difficulty getting off the toiletThere was diminished verbal output. wife said he used his left arm to try to get up but she felt his right side was weak. his eyes were closed and he was not responsive. she called EMS. ROS positive for increased urinary frequency and buring for past couple of days. no chest pain , SOB cough.    in the ER Stroke code called on arrival at 7:15a, tele-stroke activated at 7:20a. VSS stable  EXCEPT tmax 102.4. ON TELE stroke patient had NIH scale of 15. b/l weakness greater on right side. code sepsis was also initiaated. labs pertinent for wbc count of q12 , ekg nsr left axis. lactate of 2.4. cxr : Mild pulmonary vascular congestion. Small right basilar nodular opacity. ct head: Moderate severe chronic small vessel ischemic changes in the frontal parietal white matter and small chronic appearing right frontal cortical infarct. TPA given at 9 am , and icu consult called.  code sepsis was also initiated. labs pertinent for wbc count of q12 , ekg nsr left axis. lactate of 2.4. cxr : Mild pulmonary vascular congestion. Small right basilar nodular opacity. started on rocephin and zmax    # fevers unclear etiology , bladder scan with only 42 cc of urine r/o worsening pneumonia.(improved)  # no objective evidence of bacterial infection as cause of fevers on adm . all cx neg /rvp swab neg /ct chest with no consolidation . pct slightly elevated , bnp elevated   # mild elevation of wbc improved and WBC is normal today 6/21/18 (patient is off ABX).   6/22/18 Patient is afebrile, but WBC is mildly elevated.  6/23/18 Patient stays afebrile and PPM site is dry and clean.  ^/24/18 WBC is 7.7 and patient stayed afebrile and stable.    plan  No ABX seems to be indicated at this time.  Observe for fever and closely follow WBC.  Cardiac management as per cardiologist.  S/P PPM as per cardio for follow up in 2 weeks.  Pane cultures, and follow up CXR for fever or continued increase of WBC.    Discussed with medical resident, patient's daughter and wife today.    Dr. Ruiz will be back to follow the patient for infectious diseases follow up from tomorrow if patient is not discharged today.

## 2018-06-24 NOTE — PROGRESS NOTE ADULT - PROVIDER SPECIALTY LIST ADULT
Cardiology
Critical Care
Infectious Disease
Internal Medicine
Neurology
Infectious Disease
Critical Care
Critical Care
Internal Medicine

## 2018-06-24 NOTE — PROGRESS NOTE ADULT - ASSESSMENT
89 yo male from home [facility] 12 hours ambulates with walker pmh of BPH, arthiritis hypothyroidism, Aortic aneurysm presented to the hospital with complaints of weakness, aphasia , poor resposiveness, now tachy-gisele on tele.  1.D/C lasix.  2.S/P PPM f/u with Dr. Wilson 2 weeks..  3.Cont low dose b blocker.  4.ID f/u appreciated, off abx.  5.Psych f/u.  6.GI and DVT prophylaxis.

## 2018-06-24 NOTE — PROGRESS NOTE ADULT - SUBJECTIVE AND OBJECTIVE BOX
CHIEF COMPLAINT:Patient is a 89y old  Male who presents with a chief complaint of weakness. aphasia .Pt appears comfortable.    	  REVIEW OF SYSTEMS:  CONSTITUTIONAL: No fever, weight loss, or fatigue  EYES: No eye pain, visual disturbances, or discharge  ENT:  No difficulty hearing, tinnitus, vertigo; No sinus or throat pain  NECK: No pain or stiffness  RESPIRATORY: No cough, wheezing, chills or hemoptysis; No Shortness of Breath  CARDIOVASCULAR: No chest pain, palpitations, passing out, dizziness, or leg swelling  GASTROINTESTINAL: No abdominal or epigastric pain. No nausea, vomiting, or hematemesis; No diarrhea or constipation. No melena or hematochezia.  GENITOURINARY: No dysuria, frequency, hematuria, or incontinence  NEUROLOGICAL: No headaches, memory loss, loss of strength, numbness, or tremors  SKIN: No itching, burning, rashes, or lesions   LYMPH Nodes: No enlarged glands  ENDOCRINE: No heat or cold intolerance; No hair loss  MUSCULOSKELETAL: No joint pain or swelling; No muscle, back, or extremity pain  PSYCHIATRIC: No depression, anxiety, mood swings, or difficulty sleeping  HEME/LYMPH: No easy bruising, or bleeding gums  ALLERGY AND IMMUNOLOGIC: No hives or eczema	      PHYSICAL EXAM:  T(C): 36.3 (06-24-18 @ 08:18), Max: 36.6 (06-23-18 @ 15:45)  HR: 71 (06-24-18 @ 08:18) (65 - 72)  BP: 126/70 (06-24-18 @ 08:18) (102/52 - 133/54)  RR: 20 (06-24-18 @ 08:18) (17 - 20)  SpO2: 100% (06-24-18 @ 08:18) (96% - 100%)      Appearance: Normal	  HEENT:   Normal oral mucosa, PERRL, EOMI	  Lymphatic: No lymphadenopathy  Cardiovascular: Normal S1 S2, No JVD, No murmurs, No edema  Respiratory: Lungs clear to auscultation	  Psychiatry: A & O x 3, Mood & affect appropriate  Gastrointestinal:  Soft, Non-tender, + BS	  Skin: No rashes, No ecchymoses, No cyanosis	  Neurologic: Non-focal  Extremities: Normal range of motion, No clubbing, cyanosis or edema  Vascular: Peripheral pulses palpable 2+ bilaterally    MEDICATIONS  (STANDING):  aspirin  chewable 81 milliGRAM(s) Oral daily  atorvastatin 40 milliGRAM(s) Oral at bedtime  docusate sodium 100 milliGRAM(s) Oral two times a day  heparin  Injectable 5000 Unit(s) SubCutaneous every 12 hours  levothyroxine 100 MICROGram(s) Oral daily  metoprolol tartrate 12.5 milliGRAM(s) Oral two times a day  potassium chloride   Powder 20 milliEquivalent(s) Oral daily  risperiDONE   Tablet 1 milliGRAM(s) Oral at bedtime  senna 2 Tablet(s) Oral at bedtime  tamsulosin 0.4 milliGRAM(s) Oral at bedtime      	  LABS:	 	                         8.5    7.7   )-----------( 175      ( 24 Jun 2018 07:13 )             27.1     06-24    146<H>  |  111<H>  |  31<H>  ----------------------------<  112<H>  4.2   |  31  |  0.67    Ca    8.6      24 Jun 2018 07:13      proBNP: Serum Pro-Brain Natriuretic Peptide: 7688 pg/mL (06-16 @ 07:37)    Lipid Profile: Cholesterol 105  LDL 47  HDL 43  TG 76  Cholesterol 118  LDL 55  HDL 48  TG 76    HgA1c: Hemoglobin A1C, Whole Blood: 5.2 % (06-13 @ 18:32)    TSH: Thyroid Stimulating Hormone, Serum: 1.76 uU/mL (06-13 @ 12:14)

## 2018-11-27 NOTE — DIETITIAN INITIAL EVALUATION ADULT. - NUTRITION INTERVENTION
Device Discharge  Dressing:  Clean, dry, and intact  Chest XR reviewed:  Yes  Pneumo present?:  No  Lead Measurements per Device Rep:  WNL    Education Provided:  Avoid raising right arm above the level of the shoulder for 3 weeks.  Do not shower until outer occlusive dressing has been removed.  Remove outer dressing in 3 - 4 days, on Thursday  Leave steri-strips in place, these will be removed at the 1 week check.   Call Device Clinic with increased swelling, drainage, or fever > 101 degrees.   Follow-up with the Device Clinic as scheduled.  12/04/2018 @ 11:15        
Meals and Snack/Medical Food Supplements

## 2025-04-23 NOTE — DIETITIAN INITIAL EVALUATION ADULT. - WEIGHT IN KG
04/29/25      Gerard Travis  [address] [place] [id number]      Regarding your labs on 4/23/25, your hemoglobin is improved after transfusion. Please continue taking your iron tablets (325 MG daily). You are due for a recheck of your labs, please call and schedule an appointment for this as soon as possible at [phone].    Sincerely,     Guzman Corbett RN    [facility] [address]  Phone: [phone]  Fax: [phone]                 64.4